# Patient Record
Sex: MALE | Race: WHITE | NOT HISPANIC OR LATINO | Employment: OTHER | URBAN - METROPOLITAN AREA
[De-identification: names, ages, dates, MRNs, and addresses within clinical notes are randomized per-mention and may not be internally consistent; named-entity substitution may affect disease eponyms.]

---

## 2017-09-10 ENCOUNTER — APPOINTMENT (OUTPATIENT)
Dept: CT IMAGING | Facility: CLINIC | Age: 82
End: 2017-09-10
Attending: NURSE PRACTITIONER
Payer: MEDICARE

## 2017-09-10 ENCOUNTER — HOSPITAL ENCOUNTER (OUTPATIENT)
Facility: CLINIC | Age: 82
Setting detail: OBSERVATION
Discharge: HOME | End: 2017-09-12
Attending: NURSE PRACTITIONER | Admitting: INTERNAL MEDICINE
Payer: MEDICARE

## 2017-09-10 ENCOUNTER — APPOINTMENT (OUTPATIENT)
Dept: GENERAL RADIOLOGY | Facility: CLINIC | Age: 82
End: 2017-09-10
Attending: NURSE PRACTITIONER
Payer: MEDICARE

## 2017-09-10 DIAGNOSIS — I71.43 ANEURYSM OF INFRARENAL ABDOMINAL AORTA (H): ICD-10-CM

## 2017-09-10 DIAGNOSIS — R56.9 SEIZURE (H): Primary | ICD-10-CM

## 2017-09-10 DIAGNOSIS — N17.9 ACUTE RENAL FAILURE, UNSPECIFIED ACUTE RENAL FAILURE TYPE (H): ICD-10-CM

## 2017-09-10 DIAGNOSIS — K56.7 ILEUS (H): ICD-10-CM

## 2017-09-10 DIAGNOSIS — R91.8 PULMONARY NODULES: ICD-10-CM

## 2017-09-10 DIAGNOSIS — R40.20 LOC (LOSS OF CONSCIOUSNESS) (H): ICD-10-CM

## 2017-09-10 LAB
ALBUMIN UR-MCNC: NEGATIVE MG/DL
ANION GAP SERPL CALCULATED.3IONS-SCNC: 10 MMOL/L (ref 3–14)
APPEARANCE UR: CLEAR
BILIRUB UR QL STRIP: NEGATIVE
BUN SERPL-MCNC: 42 MG/DL (ref 7–30)
CALCIUM SERPL-MCNC: 8.3 MG/DL (ref 8.5–10.1)
CHLORIDE SERPL-SCNC: 95 MMOL/L (ref 94–109)
CO2 SERPL-SCNC: 23 MMOL/L (ref 20–32)
COLOR UR AUTO: ABNORMAL
CREAT SERPL-MCNC: 1.31 MG/DL (ref 0.66–1.25)
GFR SERPL CREATININE-BSD FRML MDRD: 52 ML/MIN/1.7M2
GLUCOSE BLDC GLUCOMTR-MCNC: 216 MG/DL (ref 70–99)
GLUCOSE SERPL-MCNC: 225 MG/DL (ref 70–99)
GLUCOSE UR STRIP-MCNC: 150 MG/DL
HGB UR QL STRIP: NEGATIVE
KETONES UR STRIP-MCNC: NEGATIVE MG/DL
LEUKOCYTE ESTERASE UR QL STRIP: NEGATIVE
NITRATE UR QL: NEGATIVE
PH UR STRIP: 7 PH (ref 5–7)
POTASSIUM SERPL-SCNC: 4.4 MMOL/L (ref 3.4–5.3)
RBC #/AREA URNS AUTO: 0 /HPF (ref 0–2)
SODIUM SERPL-SCNC: 128 MMOL/L (ref 133–144)
SOURCE: ABNORMAL
SP GR UR STRIP: 1.01 (ref 1–1.03)
TROPONIN I SERPL-MCNC: <0.015 UG/L (ref 0–0.04)
UROBILINOGEN UR STRIP-MCNC: 0 MG/DL (ref 0–2)
WBC #/AREA URNS AUTO: 1 /HPF (ref 0–2)

## 2017-09-10 PROCEDURE — 99207 ZZC DOWN CODE DUE TO INITIAL EXAM: CPT | Performed by: INTERNAL MEDICINE

## 2017-09-10 PROCEDURE — 96365 THER/PROPH/DIAG IV INF INIT: CPT

## 2017-09-10 PROCEDURE — 93005 ELECTROCARDIOGRAM TRACING: CPT

## 2017-09-10 PROCEDURE — G0378 HOSPITAL OBSERVATION PER HR: HCPCS

## 2017-09-10 PROCEDURE — 81001 URINALYSIS AUTO W/SCOPE: CPT | Performed by: NURSE PRACTITIONER

## 2017-09-10 PROCEDURE — 99285 EMERGENCY DEPT VISIT HI MDM: CPT | Mod: 25

## 2017-09-10 PROCEDURE — 71260 CT THORAX DX C+: CPT

## 2017-09-10 PROCEDURE — 70450 CT HEAD/BRAIN W/O DYE: CPT

## 2017-09-10 PROCEDURE — 71020 XR CHEST 2 VW: CPT

## 2017-09-10 PROCEDURE — 96361 HYDRATE IV INFUSION ADD-ON: CPT

## 2017-09-10 PROCEDURE — 80048 BASIC METABOLIC PNL TOTAL CA: CPT | Performed by: NURSE PRACTITIONER

## 2017-09-10 PROCEDURE — 71250 CT THORAX DX C-: CPT

## 2017-09-10 PROCEDURE — 85025 COMPLETE CBC W/AUTO DIFF WBC: CPT | Performed by: NURSE PRACTITIONER

## 2017-09-10 PROCEDURE — 25000128 H RX IP 250 OP 636: Performed by: NURSE PRACTITIONER

## 2017-09-10 PROCEDURE — 99218 ZZC INITIAL OBSERVATION CARE,LEVL I: CPT | Performed by: INTERNAL MEDICINE

## 2017-09-10 PROCEDURE — 00000146 ZZHCL STATISTIC GLUCOSE BY METER IP

## 2017-09-10 PROCEDURE — 74177 CT ABD & PELVIS W/CONTRAST: CPT

## 2017-09-10 PROCEDURE — 84484 ASSAY OF TROPONIN QUANT: CPT | Performed by: NURSE PRACTITIONER

## 2017-09-10 RX ORDER — SODIUM CHLORIDE 9 MG/ML
1000 INJECTION, SOLUTION INTRAVENOUS CONTINUOUS
Status: DISCONTINUED | OUTPATIENT
Start: 2017-09-10 | End: 2017-09-11

## 2017-09-10 RX ORDER — IOPAMIDOL 755 MG/ML
500 INJECTION, SOLUTION INTRAVASCULAR ONCE
Status: COMPLETED | OUTPATIENT
Start: 2017-09-10 | End: 2017-09-10

## 2017-09-10 RX ADMIN — SODIUM CHLORIDE 1000 ML: 9 INJECTION, SOLUTION INTRAVENOUS at 23:36

## 2017-09-10 RX ADMIN — IOPAMIDOL 83 ML: 755 INJECTION, SOLUTION INTRAVENOUS at 23:20

## 2017-09-10 RX ADMIN — SODIUM CHLORIDE 61 ML: 9 INJECTION, SOLUTION INTRAVENOUS at 23:20

## 2017-09-10 RX ADMIN — SODIUM CHLORIDE 1000 ML: 9 INJECTION, SOLUTION INTRAVENOUS at 19:50

## 2017-09-10 ASSESSMENT — ENCOUNTER SYMPTOMS
APPETITE CHANGE: 1
WEAKNESS: 1
SEIZURES: 1
ABDOMINAL PAIN: 0
COUGH: 0
HEADACHES: 0
NAUSEA: 0

## 2017-09-10 NOTE — ED PROVIDER NOTES
"  History     Chief Complaint:  Loss of Consciousness      HPI   Franklyn Hein is a 84 year old male with a history of bone marrow cancer who presents with loss of consciousness. According the patient's wife, the patient is currently undergoing treatment for his cancer, through stomach injections every Tuesday and Friday. She states that today the patient had been laying down outside and came in because it started to rain, when he sat down he fell backwards in the chair and was unconscious for one minute. His daughter states that his face went blank and that it looked as if \"he was staring through her\". During that time he had an episode of urinary incontinence. When he came to he was very confused and did not remember the incident. After the family conferred they estimated this is probably the third time this has happened. Upon patient's arrival to the ED, he only complains of feeling globally weak. He also notes that he has not eaten or drank very much today. Patient had two normal bowel movements yesterday. Patient denies headache, fall, chest pain, nausea, abdominal pain, or cough. Patient denies all other complaints.     Allergies:  No known drug allergies      Medications:    Metformin  Amlodipine  Dexamethasone  Acyclovir  Atorvastatin  Prochlorperazine  Lorazepam    Past Medical History:    Cancer - Bone Marrow  Diabetes Mellitus, Type II    Past Surgical History:    History reviewed. No pertinent surgical history.     Family History:    History reviewed. No pertinent family history.      Social History:  Presents with wife and daughters   Tobacco use: unknown  Alcohol use: unknown  PCP: [facility]    Marital Status:       Review of Systems   Constitutional: Positive for appetite change.   Respiratory: Negative for cough.    Cardiovascular: Negative for chest pain.   Gastrointestinal: Negative for abdominal pain and nausea.   Neurological: Positive for seizures and weakness. " "Negative for headaches.   All other systems reviewed and are negative.    Physical Exam     Patient Vitals for the past 24 hrs:   BP Temp Temp src Pulse Heart Rate Resp SpO2 Height Weight   09/11/17 0121 118/62 98.6  F (37  C) Oral - 89 16 97 % - -   09/11/17 0015 (!) 162/92 - - - 70 - 98 % - -   09/11/17 0000 (!) 175/96 - - - 73 - 97 % - -   09/10/17 2345 (!) 164/92 - - - - - 99 % - -   09/10/17 2300 133/84 - - - 74 - 99 % 1.778 m (5' 10\") 64 kg (141 lb)   09/10/17 2250 - - - - 76 - 99 % - -   09/10/17 2249 140/88 - - - 76 - 99 % - -   09/10/17 2112 - - - - - - 100 % - -   09/10/17 2106 (!) 164/94 - - - - - - - -   09/10/17 1945 (!) 171/95 - - - 71 - 100 % - -   09/10/17 1832 149/88 96.8  F (36  C) Oral 75 75 16 100 % - -           Physical Exam     General:  Thin male, appears stated age and wearing hearing aids  HENT: Atraumatic, normal external ears, no or erythema of TMs, no posterior oropharynx swelling, erythema, or exudate. Bilateral hearing aids in place.  Eyes: No scleral injection, conjunctivitis, or drainage. EOM intact. No nystagmus.   Neck: Supple with normal ROM  Cardio: Regular rate and rhythm, no murmurs, rubs, or gallops  Pulmonary/Chest: Clear to ausculation bilaterally. No stridor or wheezes over the upper or lower airways.    Abdomen: Soft, non-tender, normal bowel sounds, no rebound or guarding  Musculoskeletal: No pedal edema, posterior tibial 2+, dorsalis pedis 2+, normal strength and tone  Lymph: No anterior or posterior lymphadenopathy.   Neuro: Alert and oriented, pupils equal, round, and reactive, tongue protrudes midline, normal speech, no focal deficits noted.   Skin: Normal color and temperature, no rashes, abrasions, or lacerations, no diaphoresis.  Psych: Mood and affect normal. Speech non-pressured.        Emergency Department Course   ECG (15:54:38):  Rate 72 bpm. IA interval 162. QRS duration 104. QT/QTc 412/451. P-R-T axes 79 78 81. Normal sinus rhythm. Normal ECG. Interpreted at " 1558 by \[facility] [place] [facility]\"".     Imaging:  Radiographic findings were communicated with the patient and family who voiced understanding of the findings.    CT Head w/o Contrast:   1. No acute abnormality.  2. Atrophy of the brain.  White matter changes consistent with sequelae of small vessel ischemic disease.     Chest XR, PA & LAT:   IMPRESSION: Hyperinflated lungs with radiolucency consistent with bullous emphysema, worse on left than on the right. Vague infiltrates laterally at both lung bases could be acute or chronic. Normal heart size and pulmonary vascularity. No pleural effusion.    CHRIS CASTRO MD      CT Chest with IV Contrast:   IMPRESSION:  1. Multiple bilateral pulmonary nodules at the lung bases. Some interstitial prominence also noted. See below for follow up recommendations. One of the largest nodules is 0.9 cm at the right lower lobe.  2. No lobar pneumonia identified.  3. Diffuse emphysema.  4. Very large infrarenal abdominal aortic aneurysm that is 7.2 cm. No adjacent hemorrhage visualized on the included images. Consider  vascular consultation for management.  5. Trace ascites.     CT Chest/Abdomen/Pelvis w Contrast:   IMPRESSION:  1. 7 cm abdominal aortic aneurysm and bilateral common iliac artery aneurysms. No aortic dissection.  2. Emphysema.  3. A few indeterminate lung nodules measuring up to 0.9 cm. Follow-up recommended.   4. Dilated small bowel loops without evidence of obstruction. This may be an ileus.  5. Small amount of ascites.    Results per radiology.     Laboratory:  CBC: HGB 10.9 (L),  (L) o/w WNL (WBC 7.5)    BMP: (Creatinine 1.31 (H)),  (L), Glucose 225 (H), BUN 42 (H), GFR 52 (L), Calcium 8.3 (L) o/3 WNL  Glucose: 216 (H)     1932: Troponin I: <0.015     UA with micro: Pending    Interventions:  1950: NS 1L IV Bolus     Emergency Department Course:  Past medical records, nursing notes, and vitals reviewed.  1829: I performed an exam of the patient and obtained history, as  documented above.  IV inserted and blood drawn.   Above interventions provided.   The patient was sent for a head CT, chest CT, chest/abdomen/pelvis CT, and chest xray while in the emergency department, findings above.   2245: I spoke with Dr. Lejeune from vascular surgery regarding this patient.   I personally reviewed the laboratory results with the Patient and spouse and daughter and answered all related questions prior to admission.  Findings and plan explained to the Patient and spouse and daughter who consents to admission.   2217: Discussed the patient with Dr. Enamorado, who will admit the patient to a med/surg bed for further monitoring, evaluation, and treatment.        Impression & Plan    Medical Decision Making:    Franklyn 84 male with a history of bone marrow cancer presents with loss of consciousness and possible seizure. Head CT performed no acute findings. There is no evidence of pneumonia. There were findings of pulmonary nodule and infrarenal abdominal aortic aneurysm. Patient is asymptomatic consulted  Dr. Lejeune discussed results and findings of ARF recommended CT chest/abd/pelvis and pre hydrate. Showed  7 cm abdominal aortic aneurysm and bilateral common iliac artery  aneurysms. No aortic dissection. Re-paged Dr. Lejeune updated results. Patient and family were updated of findings and were amendable to admission for further work up and close monitoring.       Diagnosis:      ICD-10-CM    1. LOC (loss of consciousness) (H) R40.20 UA with Microscopic     Glucose by meter     Glucose by meter   2. Pulmonary nodules R91.8    3. Acute renal failure, unspecified acute renal failure type (H) N17.9    4. Aneurysm of infrarenal abdominal aorta (H) I71.4     7.2 CM   5. Ileus (H) K56.7        Disposition:  Admitted to a med/surg bed.     9/10/2017   [facility] EMERGENCY DEPARTMENT  I, Ninfa Butler am serving as a scribe at 6:29 PM on 9/10/2017 to document services personally performed by  Clare Mckeon, OCTAVIO C based on my observations and the provider's statements to me.       Clare Mckeon, OCTAVIO CNP  09/11/17 0233

## 2017-09-10 NOTE — IP AVS SNAPSHOT
MRN:[id number]                      After Visit Summary   9/10/2017    Franklyn Hein    MRN: [id number]           Thank you!     Thank you for choosing [facility] for your care. Our goal is always to provide you with excellent care. Hearing back from our patients is one way we can continue to improve our services. Please take a few minutes to complete the written survey that you may receive in the mail after you visit. If you would like to speak to someone directly about your visit please contact Patient Relations at [phone]. Thank you!          Patient Information     Date Of Birth          10/28/1932        About your hospital stay     You were admitted on:  September 10, 2017 You last received care in the:  [facility] Observation Department    You were discharged on:  September 12, 2017        Reason for your hospital stay       Possible seizure                  Who to Call     For medical emergencies, please call 911.  For non-urgent questions about your medical care, please call your primary care provider or clinic, None          Attending Provider     Provider Specialty    Clare Mckeon APRN CNP Nurse Practitioner - Family    [facility], [facility]    Megan Sam DO Internal Medicine       Primary Care Provider Fax #    [facility] [phone]      After Care Instructions     Activity       Your activity upon discharge: activity as tolerated            Diet       Follow this diet upon discharge: Orders Placed This Encounter      Combination Diet 5551-5870 Calories: Moderate Consistent CHO (4-6 CHO units/meal); Mechanical/Dental Soft Diet                  Follow-up Appointments     Follow-up and recommended labs and tests        Follow up with primary care provider, [facility], within 7 days for hospital follow- up.  No follow up labs or test are needed. Follow-up with [facility] of  "Neurology ([phone]. Dr. Hernandez is the Neurologist who saw you here). You need follow-up with a vascular surgeon for the aortic aneurysm seen on CT scan. I have placed a referral for vascular surgery through [place]. I have also called your PCP to inform him.                  Additional Services     Home Care PT Referral for Hospital Discharge       PT to eval and treat    Your provider has ordered home care - physical therapy. If you have not been contacted within 2 days of your discharge please call the department phone number listed on the top of this document.            Home care nursing referral       RN extended hours visit. RN to assess vital signs and weight.    Your provider has ordered home care nursing services. If you have not been contacted within 2 days of your discharge please call the inpatient department phone number at [phone] .            Vascular Surgery Referral                            Further instructions from your care team       A referral has been made to [facility] for Physical Therapy and Skilled Nursing services. They will contact you in the next 24 - 48 hours. If you have not heard from them in that time, please contact them at [phone].     Pending Results     No orders found from 9/8/2017 to 9/11/2017.            Statement of Approval     Ordered          09/12/17 1325  I have reviewed and agree with all the recommendations and orders detailed in this document.  EFFECTIVE NOW     Approved and electronically signed by:  Porfirio Clifton MD             Admission Information     Date & Time Provider Department Dept. Phone    9/10/2017 Megan Sam DO [facility] Observation Department [phone]      Your Vitals Were     Blood Pressure Pulse Temperature Respirations Height Weight    101/55 (BP Location: Left arm) 75 96.5  F (35.8  C) (Oral) 16 1.778 m (5' 10\") 64 kg (141 lb)    Pulse Oximetry BMI (Body Mass Index)                98% " "20.23 kg/m2          [employer] Information     [employer] lets you send messages to your doctor, view your test results, renew your prescriptions, schedule appointments and more. To sign up, go to [email or web address] . Click on \"Log in\" on the left side of the screen, which will take you to the Welcome page. Then click on \"Sign up Now\" on the right side of the page.     You will be asked to enter the access code listed below, as well as some personal information. Please follow the directions to create your username and password.     Your access code is: [email or web address]  Expires: 2017 12:36 PM     Your access code will  in 90 days. If you need help or a new code, please call your [facility] clinic or [phone].        Care EveryWhere ID     This is your Care EveryWhere ID. This could be used by other organizations to access your [facility] medical records  [id number]        Equal Access to Services     LO FRYE : Hadii van bhatto Sotrent, waaxda luqadaha, qaybta kaalmada adeegyascooby, klever donovan . So [facility] [phone].    ATENCIÓN: Si habla español, tiene a bartlett disposición servicios gratuitos de asistencia lingüística. Radha al [phone].    We comply with applicable federal civil rights laws and [place] laws. We do not discriminate on the basis of race, color, national origin, age, disability sex, sexual orientation or gender identity.               Review of your medicines      START taking        Dose / Directions    levETIRAcetam 250 MG tablet   Commonly known as:  KEPPRA   Notes to Patient:  This medication was started by Neurology during hospitalization 9/10-        Dose:  250 mg   Take 1 tablet (250 mg) by mouth 2 times daily   Quantity:  60 tablet   Refills:  0         CONTINUE these medicines which have NOT CHANGED        Dose / Directions    ACYCLOVIR PO        Dose:  400 mg   Take 400 mg by mouth 2 times daily   Refills:  0       AMLODIPINE BESYLATE PO "        Dose:  2.5 mg   Take 2.5 mg by mouth daily   Refills:  0       ATORVASTATIN CALCIUM PO        Dose:  10 mg   Take 10 mg by mouth daily   Refills:  0       DEXAMETHASONE PO        Dose:  40 mg   Take 40 mg by mouth Dexamethasone 4mg tabs: take 10 tabs (40mg) daily on days 1-4 and days 9-12 every 21 days with chemo.   Refills:  0       LISINOPRIL PO        Dose:  10 mg   Take 10 mg by mouth daily   Refills:  0       LORAZEPAM PO        Dose:  0.5-1 mg   Take 0.5-1 mg by mouth every 6 hours as needed for anxiety or nausea   Refills:  0       metFORMIN 500 MG 24 hr tablet   Commonly known as:  GLUCOPHAGE-XR        Dose:  1000 mg   Take 1,000 mg by mouth daily   Refills:  0       PROCHLORPERAZINE MALEATE PO        Dose:  5-10 mg   Take 5-10 mg by mouth every 6 hours as needed for nausea   Refills:  0            Where to get your medicines      These medications were sent to [facility] [address] [facility]  [address] [facility] [address]     Phone:  [phone]     levETIRAcetam 250 MG tablet                Protect others around you: Learn how to safely use, store and throw away your medicines at www.disposemymeds.org.             Medication List: This is a list of all your medications and when to take them. Check marks below indicate your daily home schedule. Keep this list as a reference.      Medications           Morning Afternoon Evening Bedtime As Needed    ACYCLOVIR PO   Take 400 mg by mouth 2 times daily   Last time this was given:  400 mg on 9/12/2017 12:05 PM                                      AMLODIPINE BESYLATE PO   Take 2.5 mg by mouth daily                                   ATORVASTATIN CALCIUM PO   Take 10 mg by mouth daily                                   DEXAMETHASONE PO   Take 40 mg by mouth Dexamethasone 4mg tabs: take 10 tabs (40mg) daily on days 1-4 and days 9-12 every 21 days with chemo.                            Take as directed by  Oncologist       levETIRAcetam 250 MG tablet   Commonly known as:  KEPPRA   Take 1 tablet (250 mg) by mouth 2 times daily   Last time this was given:  250 mg on 9/12/2017  4:09 AM   Next Dose Due:  4:00PM   Notes to Patient:  This medication was started by Neurology during hospitalization 9/10-9/12            4:00AM.  You may want to change the times that this med is given.  If so, please take note to take every 12 hours.           4:00PM               LISINOPRIL PO   Take 10 mg by mouth daily   Last time this was given:  10 mg on 9/11/2017  3:59 PM                                   LORAZEPAM PO   Take 0.5-1 mg by mouth every 6 hours as needed for anxiety or nausea                                   metFORMIN 500 MG 24 hr tablet   Commonly known as:  GLUCOPHAGE-XR   Take 1,000 mg by mouth daily                                   PROCHLORPERAZINE MALEATE PO   Take 5-10 mg by mouth every 6 hours as needed for nausea

## 2017-09-10 NOTE — ED NOTES
"[place], sat on the couch, then suddenly fell back into the couch and would not respond to anyone. This event lasted about a minute per his wife, incontinent during event and confused afterward. Alert and oriented x 4 now, but has little insight into the event and does not remember it.    History of \"bone marrow cancer\" and currently getting chemotherapy for this. He has been \"getting a shot for anemia too\", per his wife.  "

## 2017-09-10 NOTE — Clinical Note
Admitting Physician: MILANA AGUILAR [[id number]]   [facility] [[id number][facility]   Bed Type: Adult Med/Surg [46]   Special needs: Fall Risk [8]   Special needs: Cancer [7]   Bed request comments: MED ADMIT / BED REQUEST AT 7420

## 2017-09-10 NOTE — IP AVS SNAPSHOT
[facility] Observation Department    [address]    [facility] [address]    Phone:  [phone]                                       After Visit Summary   9/10/2017    Franklyn Hein    MRN: [id number]           After Visit Summary Signature Page     I have received my discharge instructions, and my questions have been answered. I have discussed any challenges I see with this plan with the nurse or doctor.    ..........................................................................................................................................  Patient/Patient Representative Signature      ..........................................................................................................................................  Patient Representative Print Name and Relationship to Patient    ..................................................               ................................................  Date                                            Time    ..........................................................................................................................................  Reviewed by Signature/Title    ...................................................              ..............................................  Date                                                            Time

## 2017-09-11 ENCOUNTER — APPOINTMENT (OUTPATIENT)
Dept: PHYSICAL THERAPY | Facility: CLINIC | Age: 82
End: 2017-09-11
Attending: INTERNAL MEDICINE
Payer: MEDICARE

## 2017-09-11 ENCOUNTER — APPOINTMENT (OUTPATIENT)
Dept: MRI IMAGING | Facility: CLINIC | Age: 82
End: 2017-09-11
Attending: INTERNAL MEDICINE
Payer: MEDICARE

## 2017-09-11 PROBLEM — R56.9 SEIZURE (H): Status: ACTIVE | Noted: 2017-09-11

## 2017-09-11 LAB
ANION GAP SERPL CALCULATED.3IONS-SCNC: 7 MMOL/L (ref 3–14)
BASOPHILS # BLD AUTO: 0 10E9/L (ref 0–0.2)
BASOPHILS NFR BLD AUTO: 0 %
BUN SERPL-MCNC: 31 MG/DL (ref 7–30)
CALCIUM SERPL-MCNC: 7.4 MG/DL (ref 8.5–10.1)
CHLORIDE SERPL-SCNC: 104 MMOL/L (ref 94–109)
CO2 SERPL-SCNC: 23 MMOL/L (ref 20–32)
CREAT SERPL-MCNC: 1.12 MG/DL (ref 0.66–1.25)
DIFFERENTIAL METHOD BLD: ABNORMAL
EOSINOPHIL # BLD AUTO: 0 10E9/L (ref 0–0.7)
EOSINOPHIL NFR BLD AUTO: 0 %
ERYTHROCYTE [DISTWIDTH] IN BLOOD BY AUTOMATED COUNT: 14.3 % (ref 10–15)
GFR SERPL CREATININE-BSD FRML MDRD: 62 ML/MIN/1.7M2
GLUCOSE BLDC GLUCOMTR-MCNC: 122 MG/DL (ref 70–99)
GLUCOSE BLDC GLUCOMTR-MCNC: 137 MG/DL (ref 70–99)
GLUCOSE BLDC GLUCOMTR-MCNC: 142 MG/DL (ref 70–99)
GLUCOSE BLDC GLUCOMTR-MCNC: 156 MG/DL (ref 70–99)
GLUCOSE BLDC GLUCOMTR-MCNC: 170 MG/DL (ref 70–99)
GLUCOSE SERPL-MCNC: 170 MG/DL (ref 70–99)
HBA1C MFR BLD: 8.2 % (ref 4.3–6)
HCT VFR BLD AUTO: 31.6 % (ref 40–53)
HGB BLD-MCNC: 10.9 G/DL (ref 13.3–17.7)
INTERPRETATION ECG - MUSE: NORMAL
LYMPHOCYTES # BLD AUTO: 0.3 10E9/L (ref 0.8–5.3)
LYMPHOCYTES NFR BLD AUTO: 4 %
MAGNESIUM SERPL-MCNC: 1.9 MG/DL (ref 1.6–2.3)
MCH RBC QN AUTO: 33 PG (ref 26.5–33)
MCHC RBC AUTO-ENTMCNC: 34.5 G/DL (ref 31.5–36.5)
MCV RBC AUTO: 96 FL (ref 78–100)
METAMYELOCYTES # BLD: 0.2 10E9/L
METAMYELOCYTES NFR BLD MANUAL: 3 %
MONOCYTES # BLD AUTO: 0.4 10E9/L (ref 0–1.3)
MONOCYTES NFR BLD AUTO: 5 %
MYELOCYTES # BLD: 0.8 10E9/L
MYELOCYTES NFR BLD MANUAL: 11 %
NEUTROPHILS # BLD AUTO: 5.8 10E9/L (ref 1.6–8.3)
NEUTROPHILS NFR BLD AUTO: 77 %
NRBC # BLD AUTO: 0.1 10*3/UL
NRBC BLD AUTO-RTO: 1 /100
PLATELET # BLD AUTO: 118 10E9/L (ref 150–450)
PLATELET # BLD EST: NORMAL 10*3/UL
POTASSIUM SERPL-SCNC: 4.3 MMOL/L (ref 3.4–5.3)
RBC # BLD AUTO: 3.3 10E12/L (ref 4.4–5.9)
RBC MORPH BLD: ABNORMAL
SODIUM SERPL-SCNC: 134 MMOL/L (ref 133–144)
TSH SERPL DL<=0.005 MIU/L-ACNC: 2.16 MU/L (ref 0.4–4)
WBC # BLD AUTO: 7.5 10E9/L (ref 4–11)

## 2017-09-11 PROCEDURE — A9270 NON-COVERED ITEM OR SERVICE: HCPCS | Mod: GY | Performed by: PHYSICIAN ASSISTANT

## 2017-09-11 PROCEDURE — 83036 HEMOGLOBIN GLYCOSYLATED A1C: CPT | Performed by: INTERNAL MEDICINE

## 2017-09-11 PROCEDURE — 36415 COLL VENOUS BLD VENIPUNCTURE: CPT | Performed by: INTERNAL MEDICINE

## 2017-09-11 PROCEDURE — 96361 HYDRATE IV INFUSION ADD-ON: CPT

## 2017-09-11 PROCEDURE — G0378 HOSPITAL OBSERVATION PER HR: HCPCS

## 2017-09-11 PROCEDURE — 25000132 ZZH RX MED GY IP 250 OP 250 PS 637: Mod: GY | Performed by: INTERNAL MEDICINE

## 2017-09-11 PROCEDURE — 70553 MRI BRAIN STEM W/O & W/DYE: CPT

## 2017-09-11 PROCEDURE — A9270 NON-COVERED ITEM OR SERVICE: HCPCS | Mod: GY | Performed by: INTERNAL MEDICINE

## 2017-09-11 PROCEDURE — 99207 ZZC CDG-MDM COMPONENT: MEETS LOW - DOWN CODED: CPT | Performed by: INTERNAL MEDICINE

## 2017-09-11 PROCEDURE — 25000128 H RX IP 250 OP 636: Performed by: INTERNAL MEDICINE

## 2017-09-11 PROCEDURE — 99225 ZZC SUBSEQUENT OBSERVATION CARE,LEVEL II: CPT | Performed by: INTERNAL MEDICINE

## 2017-09-11 PROCEDURE — 80048 BASIC METABOLIC PNL TOTAL CA: CPT | Performed by: INTERNAL MEDICINE

## 2017-09-11 PROCEDURE — 00000146 ZZHCL STATISTIC GLUCOSE BY METER IP

## 2017-09-11 PROCEDURE — A9585 GADOBUTROL INJECTION: HCPCS | Performed by: RADIOLOGY

## 2017-09-11 PROCEDURE — 25000128 H RX IP 250 OP 636: Performed by: RADIOLOGY

## 2017-09-11 PROCEDURE — 83735 ASSAY OF MAGNESIUM: CPT | Performed by: INTERNAL MEDICINE

## 2017-09-11 PROCEDURE — 95816 EEG AWAKE AND DROWSY: CPT

## 2017-09-11 PROCEDURE — 25000132 ZZH RX MED GY IP 250 OP 250 PS 637: Mod: GY | Performed by: PHYSICIAN ASSISTANT

## 2017-09-11 PROCEDURE — 84443 ASSAY THYROID STIM HORMONE: CPT | Performed by: INTERNAL MEDICINE

## 2017-09-11 RX ORDER — LIDOCAINE 40 MG/G
CREAM TOPICAL
Status: DISCONTINUED | OUTPATIENT
Start: 2017-09-11 | End: 2017-09-12 | Stop reason: HOSPADM

## 2017-09-11 RX ORDER — AMLODIPINE BESYLATE 2.5 MG/1
2.5 TABLET ORAL DAILY
Status: DISCONTINUED | OUTPATIENT
Start: 2017-09-11 | End: 2017-09-12 | Stop reason: HOSPADM

## 2017-09-11 RX ORDER — GADOBUTROL 604.72 MG/ML
7.5 INJECTION INTRAVENOUS ONCE
Status: COMPLETED | OUTPATIENT
Start: 2017-09-11 | End: 2017-09-11

## 2017-09-11 RX ORDER — NALOXONE HYDROCHLORIDE 0.4 MG/ML
.1-.4 INJECTION, SOLUTION INTRAMUSCULAR; INTRAVENOUS; SUBCUTANEOUS
Status: DISCONTINUED | OUTPATIENT
Start: 2017-09-11 | End: 2017-09-12 | Stop reason: HOSPADM

## 2017-09-11 RX ORDER — LEVETIRACETAM 250 MG/1
250 TABLET ORAL 2 TIMES DAILY
Status: DISCONTINUED | OUTPATIENT
Start: 2017-09-11 | End: 2017-09-12 | Stop reason: HOSPADM

## 2017-09-11 RX ORDER — LISINOPRIL 10 MG/1
10 TABLET ORAL DAILY
Status: DISCONTINUED | OUTPATIENT
Start: 2017-09-11 | End: 2017-09-12

## 2017-09-11 RX ORDER — ONDANSETRON 4 MG/1
4 TABLET, ORALLY DISINTEGRATING ORAL EVERY 6 HOURS PRN
Status: DISCONTINUED | OUTPATIENT
Start: 2017-09-11 | End: 2017-09-12 | Stop reason: HOSPADM

## 2017-09-11 RX ORDER — ACYCLOVIR 400 MG/1
400 TABLET ORAL 2 TIMES DAILY
Status: DISCONTINUED | OUTPATIENT
Start: 2017-09-11 | End: 2017-09-12 | Stop reason: HOSPADM

## 2017-09-11 RX ORDER — NITROGLYCERIN 0.4 MG/1
0.4 TABLET SUBLINGUAL EVERY 5 MIN PRN
Status: DISCONTINUED | OUTPATIENT
Start: 2017-09-11 | End: 2017-09-12 | Stop reason: HOSPADM

## 2017-09-11 RX ORDER — METFORMIN HCL 500 MG
1000 TABLET, EXTENDED RELEASE 24 HR ORAL DAILY
COMMUNITY
End: 2017-09-26

## 2017-09-11 RX ORDER — ONDANSETRON 2 MG/ML
4 INJECTION INTRAMUSCULAR; INTRAVENOUS EVERY 6 HOURS PRN
Status: DISCONTINUED | OUTPATIENT
Start: 2017-09-11 | End: 2017-09-12 | Stop reason: HOSPADM

## 2017-09-11 RX ORDER — DEXTROSE MONOHYDRATE 25 G/50ML
25-50 INJECTION, SOLUTION INTRAVENOUS
Status: DISCONTINUED | OUTPATIENT
Start: 2017-09-11 | End: 2017-09-12 | Stop reason: HOSPADM

## 2017-09-11 RX ORDER — ACETAMINOPHEN 325 MG/1
650 TABLET ORAL EVERY 4 HOURS PRN
Status: DISCONTINUED | OUTPATIENT
Start: 2017-09-11 | End: 2017-09-12 | Stop reason: HOSPADM

## 2017-09-11 RX ORDER — SODIUM CHLORIDE 9 MG/ML
INJECTION, SOLUTION INTRAVENOUS CONTINUOUS
Status: DISCONTINUED | OUTPATIENT
Start: 2017-09-11 | End: 2017-09-11

## 2017-09-11 RX ORDER — NICOTINE POLACRILEX 4 MG
15-30 LOZENGE BUCCAL
Status: DISCONTINUED | OUTPATIENT
Start: 2017-09-11 | End: 2017-09-12 | Stop reason: HOSPADM

## 2017-09-11 RX ADMIN — LEVETIRACETAM 250 MG: 100 INJECTION, SOLUTION INTRAVENOUS at 00:40

## 2017-09-11 RX ADMIN — SODIUM CHLORIDE: 9 INJECTION, SOLUTION INTRAVENOUS at 01:37

## 2017-09-11 RX ADMIN — LISINOPRIL 10 MG: 10 TABLET ORAL at 15:59

## 2017-09-11 RX ADMIN — GADOBUTROL 6 ML: 604.72 INJECTION INTRAVENOUS at 19:00

## 2017-09-11 RX ADMIN — LEVETIRACETAM 250 MG: 250 TABLET, FILM COATED ORAL at 16:25

## 2017-09-11 RX ADMIN — ACYCLOVIR 400 MG: 400 TABLET ORAL at 13:08

## 2017-09-11 NOTE — PLAN OF CARE
Problem: Discharge Planning  Goal: Discharge Planning (Adult, OB, Behavioral, Peds)  Outcome: No Change  Problem: Discharge Planning  Goal: Discharge Planning (Adult, OB, Behavioral, Peds)  Outcome: Improving  PRIMARY DIAGNOSIS: Seizure  OUTPATIENT/OBSERVATION GOALS TO BE MET BEFORE DISCHARGE:  1. Orthostatic performed: No      2. Diagnostic testing complete & at baseline neurologic testing: EEG pending      3. Cleared by consultants (if involved): No      4. Interpretation of cardiac rhythm per telemetry tech: SR, HR 78      5. Tolerating adequate PO diet and medications: Yes      6. Return to near baseline physical activity or neurologic status: SBA with walker  Discharge Planner Nurse   Safe discharge environment identified: Yes  Barriers to discharge: not once medically cleared.        Entered by: Veronica Mahoney      Please review provider order for any additional goals.   Nurse to notify provider when observation goals have been met and patient is ready for discharge.      Vitals stable. Alert and oriented x4. [place]. Seizure precautions in place. Neuro's intact. No seizure activity since admission. Tele monitoring in place. Pt tolerated pills whole with water. IV fluids infusing. Pt resting comfortably. Awaiting EEG results and plan for MRI today. Will continue to monitor and provide supportive cares.

## 2017-09-11 NOTE — PROGRESS NOTES
"Pt reports that he can not swallow well. He reports the food \"gets stuck\". Pt reports this has been happening for the last month. Pt tolerated water without difficulty. Pt's family reports the pt had to get surgery \"to open his throat\" in the past. MD notified.   "

## 2017-09-11 NOTE — ED NOTES
"[facility]  ED Nurse Handoff Report    Franklyn Hein is a 84 year old male   ED Chief complaint: Loss of Consciousness  . ED Diagnosis:   Final diagnoses:   LOC (loss of consciousness) (H)   Pulmonary nodules   Acute renal failure, unspecified acute renal failure type (H)   Aneurysm of infrarenal abdominal aorta (H) - 7.2 CM     Allergies: Allergies not on file    Code Status: Full Code  Activity level - Baseline/Home:  Stand with Assist of 2. Activity Level - Current:   Stand with Assist of 2. Lift room needed: No. Bariatric: No   Needed: No   Isolation: No. Infection: Not Applicable.     Vital Signs:   Vitals:    09/10/17 2106 09/10/17 2112 09/10/17 2249 09/10/17 2250   BP: (!) 164/94  140/88    Pulse:       Resp:       Temp:       TempSrc:       SpO2:  100% 99% 99%       Cardiac Rhythm:  ,      Pain level: 0-10 Pain Scale: 0  Patient confused: No. Patient Falls Risk: Yes.   Elimination Status: Has voided   Patient Report - Initial Complaint: Syncope. [place]ed [place], sat on the couch, then suddenly fell back into the couch and would not respond to anyone. This event lasted about a minute per his wife, incontinent during event and confused afterward. Alert and oriented x 4 now, but has little insight into the event and does not remember it.  History of \"bone marrow cancer\" and currently getting chemotherapy for this. He has been \"getting a shot for anemia too\", per his wife.     Tests Performed: Labs, CXR, CT chest, CT head. Abnormal Results: Sodium 128, Cr 1.3, CT chest shows incidental findings of infrarenal abdominal aortic aneurysm of 7.2cm - will do CT chest/abdomen/pelvis with contrast.  Treatments provided: NS bolus  Family Comments: Family at bedside  OBS brochure/video discussed/provided to patient:  Yes  ED Medications:   Medications   0.9% sodium chloride BOLUS (1,000 mLs Intravenous New Bag 9/10/17 1950)     " Followed by   0.9% sodium chloride infusion (not administered)     Drips infusing:  No  For the majority of the shift this patient was Yellow. Interventions performed were Monitor.     Severe Sepsis OR Septic Shock Diagnosis Present: No      ED Nurse Name/Phone Number: Jim Mota,   11:05 PM    RECEIVING UNIT ED HANDOFF REVIEW    Above ED Nurse Handoff Report was reviewed: Yes  Reviewed by: Louise Macdonald on September 10, 2017 at 11:33 PM

## 2017-09-11 NOTE — PLAN OF CARE
Problem: Discharge Planning  Goal: Discharge Planning (Adult, OB, Behavioral, Peds)  Outcome: Improving  PRIMARY DIAGNOSIS: Seizure  OUTPATIENT/OBSERVATION GOALS TO BE MET BEFORE DISCHARGE:  1. Orthostatic performed: No     2. Diagnostic testing complete & at baseline neurologic testing: No, Neurology consult     3. Cleared by consultants (if involved): No     4. Interpretation of cardiac rhythm per telemetry tech: SR with long QT/Tall T-waves 85     5. Tolerating adequate PO diet and medications: Yes     6. Return to near baseline physical activity or neurologic status: No currently A-1 with cane, Ind with Cane a baseline  Discharge Planner Nurse   Safe discharge environment identified: Yes  Barriers to discharge: not once medically cleared.        Entered by: Louise Macdonald 09/11/2017 2:53 AM     Please review provider order for any additional goals.   Nurse to notify provider when observation goals have been met and patient is ready for discharge.     Patient is alert and oriented x4, forgetfull at times, [place]. Patient had an unresponsive event prior to admission with confusions. Suspecting seizure.  Patient reports feeling better. Neuro's intact. Tele monitoring in place, Neurology consult in place. Granddaughter at bedside. IV fluids infusing. Will continue to monitor, assess, and offer supportive cares

## 2017-09-11 NOTE — PLAN OF CARE
Problem: Discharge Planning  Goal: Discharge Planning (Adult, OB, Behavioral, Peds)  Outcome: No Change  Problem: Discharge Planning  Goal: Discharge Planning (Adult, OB, Behavioral, Peds)  Outcome: Improving  PRIMARY DIAGNOSIS: Seizure  OUTPATIENT/OBSERVATION GOALS TO BE MET BEFORE DISCHARGE:  1. Orthostatic performed: No      2. Diagnostic testing complete & at baseline neurologic testing: EEG pending      3. Cleared by consultants (if involved): No      4. Interpretation of cardiac rhythm per telemetry tech: SR, HR 78      5. Tolerating adequate PO diet and medications: Yes      6. Return to near baseline physical activity or neurologic status: SBA with walker  Discharge Planner Nurse   Safe discharge environment identified: Yes  Barriers to discharge: not once medically cleared.        Entered by: Veronica Mahoney      Please review provider order for any additional goals.   Nurse to notify provider when observation goals have been met and patient is ready for discharge.      Vitals stable. Alert and oriented x4. [place]. Seizure precautions in place. Neuro's intact. No seizure activity since admission. Tele monitoring in place. IV fluids infusing. Pt resting comfortably. Awaiting EEG results and plan for MRI today. Plan for pt to start oral Keppra and stay overnight for monitoring. Pt resting comfortably. Will continue to monitor and provide supportive cares.

## 2017-09-11 NOTE — PLAN OF CARE
Problem: Discharge Planning  Goal: Discharge Planning (Adult, OB, Behavioral, Peds)  Outcome: Improving  Problem: Discharge Planning  Goal: Discharge Planning (Adult, OB, Behavioral, Peds)  Outcome: Improving  PRIMARY DIAGNOSIS: Seizure  OUTPATIENT/OBSERVATION GOALS TO BE MET BEFORE DISCHARGE:  1. Orthostatic performed: No      2. Diagnostic testing complete & at baseline neurologic testing: No, Neurology consult      3. Cleared by consultants (if involved): No      4. Interpretation of cardiac rhythm per telemetry tech: SR, HR 78      5. Tolerating adequate PO diet and medications: Yes      6. Return to near baseline physical activity or neurologic status: SBA with walker  Discharge Planner Nurse   Safe discharge environment identified: Yes  Barriers to discharge: not once medically cleared.        Entered by: Veronica Mahoney     Please review provider order for any additional goals.   Nurse to notify provider when observation goals have been met and patient is ready for discharge.      Vitals stable. Alert and oriented x4. [place]. Seizure precautions in place. Neuro's intact. No seizure activity since admission. Tele monitoring in place, Neurology consults scheduled this am. Granddaughter at bedside. IV fluids infusing. Pt resting comfortably. Will continue to monitor and provide supportive cares.

## 2017-09-11 NOTE — H&P
"               [facility]  Hospitalist Admission Note  September 10, 2017  Name: Franklyn Hein    MRN: [id number]  YOB: 1932    Age: 84 year old  Date of admission: 9/10/2017  [facility] care provider: [facility]      Summary:  Franklyn Hein is a 84 year old male with a history of bone marrow cancer \"multiple myeloma\", hypertension, hyperlipidemia, and type 2 diabetes  who presents with brief episode of unresponsiveness associated with urinary incontinence.  Symptoms are suspicious for probable seizure event.    Problem list  1. Probable seizure event: Suspect possibly partial complex.  Head CT is otherwise negative.  Neurologically at baseline with no focal findings at this point in time  2. Type 2 diabetes  3. History of multiple myeloma per patient report: He does follow with Dr. Salcedo, [place] [facility].  4. Hypertension  5. Hyperlipidemia  6. Incidental simple 2 cm infrarenal abdominal aortic aneurysm: Likely not contributing to problem #1  7. Suspect acute on Chronic kidney disease: No previous creatinine for comparison.  Patient does appear clinically dehydrated    Plan:    Observation admission to telemetry at this time    We'll check an EEG as well as get a formal neurology consultation    Medium intensity sliding scale and resume his chronic metformin    Resume chronic essential medications once doses are reconciled.    IV fluid challenge for elevated creatinine.  Continue to monitor for now    Will need vascular surgery outpatient referral once discharged for aneurysm workup and intervention    -Additional workup plan which will include EEG and neurology consultation    All lab work and imaging data independently reviewed by myself    Prophylaxis;  Consider E [place] apparent if patient spends greater than two midnight here .   Discharge: Home with family when able    Chief Complaint:   Brief episode of unresponsiveness    HPI  Franklyn Hein is a 84 " "year old male with a history of bone marrow cancer \"multiple myeloma\", hypertension, hyperlipidemia, and type 2 diabetes  who presents with brief episode of unresponsiveness associated with urinary incontinence. According the patient's wife, the patient is currently undergoing treatment for his cancer with stomach injections every Tuesday and Friday. She states that today the patient had been laying down outside and came in because it started to rain, when he sat down he fell backwards in the chair and was unconscious for one minute. His daughter states that he developed a blank stare and that it looked as if \"he was staring through her\". During that time he had an episode of urinary incontinence. When he came to he was very confused and did not remember the incident. After the family conferred they estimated this is probably the third time this has happened. Upon patient's arrival to the ED, he only complains of feeling globally weak. He also notes that he has not eaten or drank very much today. Patient had two normal bowel movements yesterday. Patient denies headache, fall, chest pain, nausea, abdominal pain, or cough. Patient denies all other complaints.   I did discuss the case in detail with the ED physician.     Past Medical History:     Past Medical History:   Diagnosis Date     Cancer (H)     hypertension, hyperlipidemia, GERD, and type 2 diabetes  Past Surgical History:   No past surgical history on file.  Social History: , nonsmoker        Family History:  Family history reviewed. NO pertinent family history     Allergies:   Allergies not on file  Medications: To be reconciled        Review of Systems:   A Comprehensive greater than 10 system review of systems was carried out.  Pertinent positives and negatives are noted above.  Otherwise negative for contributory information.        Physical Exam:  Blood pressure 133/84, pulse 75, temperature 96.8  F (36  C), temperature source Oral, resp. rate 16, " "height 1.778 m (5' 10\"), weight 64 kg (141 lb), SpO2 99 %.  Gen: Pleasant in no acute distress.  HEENT: NCAT. EOMI. PERRL.  Neck: Normal inspection. No bruit, JVD or thyromegaly.  Lungs: Normal respiratory effort.Clear to auscultation bilaterally with no crackles or wheezes.  Card: N s1s2. RRR. No M/R/G.  Peripheral pulses present and symmetric.   Skin: No rash. Warm to the touch  Extr: No edema. CMS intact  Psychiatric: Patient alert oriented ×3.  Normal affect  Neurologic: Cranial nerves II-XII are intact.  Sensation normal.  Motor strength 5/5    Data:       Recent Labs  Lab 09/10/17  1932   WBC 7.5   HGB 10.9*   HCT 31.6*   MCV 96   *       Recent Labs  Lab 09/10/17  1932   *   POTASSIUM 4.4   CHLORIDE 95   CO2 23   ANIONGAP 10   *   BUN 42*   CR 1.31*   GFRESTIMATED 52*   GFRESTBLACK 63   HAO 8.3*     No results for input(s): INR in the last 168 hours.    Imaging:   Recent Results (from the past 24 hour(s))   CT Head w/o Contrast    Narrative    CT SCAN OF THE HEAD WITHOUT CONTRAST   9/10/2017 8:07 PM     HISTORY: Loss of consciousness. Fell backwards in a chair and was  unconscious for 1 minute.    TECHNIQUE:  Axial images of the head and coronal reformations without  IV contrast material. Radiation dose for this scan was reduced using  automated exposure control, adjustment of the mA and/or kV according  to patient size, or iterative reconstruction technique.    COMPARISON: None.    FINDINGS:  There is generalized atrophy of the brain.  There is low  attenuation in the white matter of the cerebral hemispheres consistent  with sequelae of small vessel ischemic disease. There is no evidence  of intracranial hemorrhage, mass, acute infarct or anomaly.     The visualized portions of the sinuses and mastoids appear normal.  There is no evidence of trauma.      Impression    IMPRESSION:   1. No acute abnormality.  2. Atrophy of the brain.  White matter changes consistent with  sequelae of small " vessel ischemic disease.      CHRIS CASTRO MD   XR Chest 2 Views    Narrative    CHEST TWO VIEWS  9/10/2017 8:18 PM     HISTORY: Loss of consciousness.    COMPARISON: None.      Impression    IMPRESSION: Hyperinflated lungs with radiolucency consistent with  bullous emphysema, worse on left than on the right. Vague infiltrates  laterally at both lung bases could be acute or chronic. Normal heart  size and pulmonary vascularity. No pleural effusion.    CHRIS CASTRO MD   Chest CT w/o contrast    Narrative    CT CHEST WITHOUT CONTRAST  9/10/2017 9:02 PM    HISTORY:  Question of pneumonia.    TECHNIQUE:  Scans obtained from the apices through the diaphragm  without IV contrast. Radiation dose for this scan was reduced using  automated exposure control, adjustment of the mA and/or kV according  to patient size, or iterative reconstruction technique.    COMPARISON:  Chest x-ray 9/10/2017.    FINDINGS:  No IV contrast. Thoracic aortic and coronary artery  calcifications noted. No acute mediastinal finding. No effusions  identified. Emphysematous changes diffusely. The bilateral lung bases  show some mild interstitial prominence and several pulmonary nodules.  One of the largest is at the lateral right lower lobe measuring 0.9 cm  series 5 image 54. There are several other nodules as well. Scarring  at the bilateral apices. Upper abdomen images show a large infrarenal  abdominal aortic aneurysm that is 7.2 x 6.8 cm on axial series 4 image  74. No perceptible adjacent hemorrhage. Trace ascites. Renal cysts  bilaterally.      Impression    IMPRESSION:  1. Multiple bilateral pulmonary nodules at the lung bases. Some  interstitial prominence also noted. See below for follow up  recommendations. One of the largest nodules is 0.9 cm at the right  lower lobe.  2. No lobar pneumonia identified.  3. Diffuse emphysema.  4. Very large infrarenal abdominal aortic aneurysm that is 7.2 cm. No  adjacent hemorrhage visualized on the  included images. Consider  vascular consultation for management.  5. Trace ascites.    Recommendations for an incidental lung nodule > 8mm:    Low risk patients: Consider follow-up CT in 3 months, PET/CT, and/or  tissue sampling.    High risk patients: Same as for low risk patients.    *Low Risk: Minimal or absent history of smoking or other known risk  factors.  *Nonsolid (ground-glass) or partly solid nodules may require longer  follow-up to exclude indolent adenocarcinoma.  *Recommendations based on Guidelines for the Management of Incidental  Pulmonary Nodules Detected at CT: From the Fleischner Society 2017,  Radiology 2017.       TESSA MONTERO MD   CT Chest/Abdomen/Pelvis w Contrast    Narrative    CT CHEST/ABDOMEN/PELVIS W CONTRAST   9/10/2017 11:27 PM      HISTORY:  Loss of consciousness. Bone marrow cancer.    TECHNIQUE:  CT chest, abdomen and pelvis with intravenous contrast.  Radiation dose for this scan was reduced using automated exposure  control, adjustment of the mA and/or kV according to patient size, or  iterative reconstruction technique.  83mL [id number]      COMPARISON:  CT chest 9/10/2017.    FINDINGS:  Chest: The thoracic aorta is calcified and tortuous. No thoracic  aortic aneurysm or dissection. The heart size is normal. No  mediastinal, hilar or axillary lymph node enlargement. No large  central pulmonary embolus. There is advanced emphysematous disease.  There are few bilateral lung nodules. The largest is in the right  lower lobe measuring 0.9 cm. No pneumothorax or pleural effusion.    Abdomen: Calcified granulomas in the spleen. The liver, gallbladder,  pancreas and adrenal glands are normal in appearance. There are a few  renal cysts bilaterally. The largest is at the posterior aspect of the  left kidney measuring 4.5 cm. There is a large abdominal aortic  aneurysm measuring up to 7 cm AP. The right common iliac artery is  aneurysmal at 4.3 cm. 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 There is no aortic dissection. No abdominal or pelvic lymph node  enlargement.    Pelvis: There are colonic diverticula without acute diverticulitis.  The colon is distended with gas and stool. A few small bowel loops are  mildly dilated without evidence of obstruction. There is a small  amount of ascites. No free intraperitoneal gas.      Impression    IMPRESSION:  1. 7 cm abdominal aortic aneurysm and bilateral common iliac artery  aneurysms. No aortic dissection.  2. Emphysema.  3. A few indeterminate lung nodules measuring up to 0.9 cm. Followup  recommended.   4. Dilated small bowel loops without evidence of obstruction. This may  be an ileus.  5. Small amount of ascites.    Recommendations for an incidental lung nodule > 8mm:    Low risk patients: Consider follow-up CT in 3 months, PET/CT, and/or  tissue sampling.    High risk patients: Same as for low risk patients.    *Low Risk: Minimal or absent history of smoking or other known risk  factors.  *Nonsolid (ground-glass) or partly solid nodules may require longer  follow-up to exclude indolent adenocarcinoma.  *Recommendations based on Guidelines for the Management of Incidental  Pulmonary Nodules Detected at CT: From the Fleischner Society 2017,  Radiology 2017.       Jesica Enamorado MD Pager [phone]

## 2017-09-11 NOTE — CONSULTS
[facility]    Neurology Consultation     Date of Admission:  9/10/2017  Date of Consult (When I saw the patient): 09/11/17    Assessment & Plan   Franklyn Hein is a 84 year old male who was admitted on 9/10/2017. I was asked to see the patient for loss of consciousness concerning for seizure.  The event lasted approximately 1 minute, and was associated with staring, arm posturing, and unresponsiveness.  Previous history of 2 episodes of reduced responsiveness and starting, but nothing this dramatic.  Glucose was mildly elevated by the time they arrived in the ED, and sodium was mildly low at 128.      Given the duration of the event, and the stiffening and associated postural changes, my concern is that this may have been a seizure, rather than alternative considerations like cardiogenic or hypotensive syncope, although occasionally with syncope when a patient is kept upright, we can see unusually long duration loss of consciousness, and even posturing on occasion, and the fact the episode occurred shortly after standing up after lying down for a long period of time does seem consistent with a syncopal event.      EEG is currently pending.   If this is abnormal I would recommend starting an AED.  Even should this be normal, it may be worthwhile to start him on a low dose of an antiepileptic medication given his history.  I will follow up with these results.    I discussed the above diagnosis, assessment, and further testing with the patient.  Total time: 50  Minutes, with more than 50% of the time counseling the patient or coordination of care.       Tom Hernandez MD    Code Status    Full Code        Reason for Consult   Reason for consult: I was asked by Dr. Enamorado to evaluate this patient for possible seizure.      Chief Complaint   Loss of consciousness    History is obtained from the patient and patient's significant other    History of Present Illness   Franklyn Hein is a 84 year  "old male who presents with with a history of multiple myeloma, hypertension, hyperlipidemia, and type 2 diabetes  who presents with brief episode of unresponsiveness associated with urinary incontinence.  He is amnestic to the event, so details were obtained from family members.    According the patient's wife, the patient is currently undergoing treatment for his cancer.  She states that today the patient had been laying down outside and came in because it started to rain, when he sat down he fell backwards in the chair and was unconscious for one minute. His daughter states that he developed a blank stare and that it looked as if \"he was staring through her\".  He also had his arms outstretched above his head.  He had an episode of urinary incontinence. When he came to he was very confused and did not remember the incident. After the family conferred they estimated this is probably the third time this has happened. Upon patient's arrival to the ED, he only complains of feeling globally weak. He also notes that he has not eaten or drank very much today. Patient had two normal bowel movements yesterday. Patient denies headache, fall, chest pain, nausea, abdominal pain, or cough. Patient denies all other complaints.     Past Medical History   I have reviewed this patient's medical history and updated it with pertinent information if needed.   Past Medical History:   Diagnosis Date     Cancer (H)        Past Surgical History   I have reviewed this patient's surgical history and updated it with pertinent information if needed.  No past surgical history on file.    Prior to Admission Medications   Prior to Admission Medications   Prescriptions Last Dose Informant Patient Reported? Taking?   ACYCLOVIR PO   Yes Yes   Sig: Take 400 mg by mouth 2 times daily    AMLODIPINE BESYLATE PO   Yes Yes   Sig: Take 2.5 mg by mouth daily    ATORVASTATIN CALCIUM PO   Yes Yes   Sig: Take 10 mg by mouth daily    DEXAMETHASONE PO   Yes Yes "   Sig: Take 40 mg by mouth Dexamethasone 4mg tabs: take 10 tabs (40mg) daily on days 1-4 and days 9-12 every 21 days with chemo.   LISINOPRIL PO   Yes Yes   Sig: Take 10 mg by mouth daily    LORAZEPAM PO   Yes Yes   Sig: Take 0.5-1 mg by mouth every 6 hours as needed for anxiety or nausea    PROCHLORPERAZINE MALEATE PO   Yes Yes   Sig: Take 5-10 mg by mouth every 6 hours as needed for nausea    metFORMIN (GLUCOPHAGE-XR) 500 MG 24 hr tablet   Yes Yes   Sig: Take 1,000 mg by mouth daily      Facility-Administered Medications: None     Allergies   Allergies not on file    Social History   I have reviewed this patient's social history and updated it with pertinent information if needed. Franklyn Hein      Family History   I have reviewed this patient's family history and updated it with pertinent information if needed.   No family history on file.    Review of Systems   The 10 point Review of Systems is negative other than noted in the HPI or here.     Physical Exam   Temp: 95.1  F (35.1  C) Temp src: Oral BP: 138/74 Pulse: 75 Heart Rate: 68 Resp: 16 SpO2: 97 % O2 Device: None (Room air)    Vital Signs with Ranges  Temp:  [95.1  F (35.1  C)-98.7  F (37.1  C)] 95.1  F (35.1  C)  Pulse:  [75] 75  Heart Rate:  [68-89] 68  Resp:  [16] 16  BP: (118-175)/(62-96) 138/74  SpO2:  [96 %-100 %] 97 %  141 lbs 0 oz    General Appearance:  No acute distress.  Cachectic  Neuro:       Mental Status Exam:    Awake, alert, speech fluent and appropriate       Cranial Nerves:   CN II-XII intact           Motor:   5/5 in all extremities           Reflexes:  2+, symmetric       Sensory:  Grossly intact                   Coordination:   Grossly intact       Gait:  Deferred due to fall risk.     CV: RRR        Data   Results for orders placed or performed during the hospital encounter of 09/10/17 (from the past 24 hour(s))   Glucose by meter   Result Value Ref Range    Glucose 216 (H) 70 - 99 mg/dL   EKG 12 lead   Result Value Ref Range     Interpretation ECG Click View Image link to view waveform and result    Troponin I   Result Value Ref Range    Troponin I ES <0.015 0.000 - 0.045 ug/L   Basic metabolic panel   Result Value Ref Range    Sodium 128 (L) 133 - 144 mmol/L    Potassium 4.4 3.4 - 5.3 mmol/L    Chloride 95 94 - 109 mmol/L    Carbon Dioxide 23 20 - 32 mmol/L    Anion Gap 10 3 - 14 mmol/L    Glucose 225 (H) 70 - 99 mg/dL    Urea Nitrogen 42 (H) 7 - 30 mg/dL    Creatinine 1.31 (H) 0.66 - 1.25 mg/dL    GFR Estimate 52 (L) >60 mL/min/1.7m2    GFR Estimate If Black 63 >60 mL/min/1.7m2    Calcium 8.3 (L) 8.5 - 10.1 mg/dL   CBC with platelets differential   Result Value Ref Range    WBC 7.5 4.0 - 11.0 10e9/L    RBC Count 3.30 (L) 4.4 - 5.9 10e12/L    Hemoglobin 10.9 (L) 13.3 - 17.7 g/dL    Hematocrit 31.6 (L) 40.0 - 53.0 %    MCV 96 78 - 100 fl    MCH 33.0 26.5 - 33.0 pg    MCHC 34.5 31.5 - 36.5 g/dL    RDW 14.3 10.0 - 15.0 %    Platelet Count 118 (L) 150 - 450 10e9/L    Diff Method Manual Differential     % Neutrophils 77.0 %    % Lymphocytes 4.0 %    % Monocytes 5.0 %    % Eosinophils 0.0 %    % Basophils 0.0 %    % Metamyelocytes 3.0 %    % Myelocytes 11.0 %    Nucleated RBCs 1 (H) 0 /100    Absolute Neutrophil 5.8 1.6 - 8.3 10e9/L    Absolute Lymphocytes 0.3 (L) 0.8 - 5.3 10e9/L    Absolute Monocytes 0.4 0.0 - 1.3 10e9/L    Absolute Eosinophils 0.0 0.0 - 0.7 10e9/L    Absolute Basophils 0.0 0.0 - 0.2 10e9/L    Absolute Metamyelocytes 0.2 (H) 0 10e9/L    Absolute Myelocytes 0.8 (H) 0 10e9/L    Absolute Nucleated RBC 0.1     RBC Morphology Consistent with reported results     Platelet Estimate Normal    CT Head w/o Contrast    Narrative    CT SCAN OF THE HEAD WITHOUT CONTRAST   9/10/2017 8:07 PM     HISTORY: Loss of consciousness. Fell backwards in a chair and was  unconscious for 1 minute.    TECHNIQUE:  Axial images of the head and coronal reformations without  IV contrast material. Radiation dose for this scan was reduced using  automated  exposure control, adjustment of the mA and/or kV according  to patient size, or iterative reconstruction technique.    COMPARISON: None.    FINDINGS:  There is generalized atrophy of the brain.  There is low  attenuation in the white matter of the cerebral hemispheres consistent  with sequelae of small vessel ischemic disease. There is no evidence  of intracranial hemorrhage, mass, acute infarct or anomaly.     The visualized portions of the sinuses and mastoids appear normal.  There is no evidence of trauma.      Impression    IMPRESSION:   1. No acute abnormality.  2. Atrophy of the brain.  White matter changes consistent with  sequelae of small vessel ischemic disease.      CHRIS CASTRO MD   XR Chest 2 Views    Narrative    CHEST TWO VIEWS  9/10/2017 8:18 PM     HISTORY: Loss of consciousness.    COMPARISON: None.      Impression    IMPRESSION: Hyperinflated lungs with radiolucency consistent with  bullous emphysema, worse on left than on the right. Vague infiltrates  laterally at both lung bases could be acute or chronic. Normal heart  size and pulmonary vascularity. No pleural effusion.    CHRIS CASTRO MD   Chest CT w/o contrast    Narrative    CT CHEST WITHOUT CONTRAST  9/10/2017 9:02 PM    HISTORY:  Question of pneumonia.    TECHNIQUE:  Scans obtained from the apices through the diaphragm  without IV contrast. Radiation dose for this scan was reduced using  automated exposure control, adjustment of the mA and/or kV according  to patient size, or iterative reconstruction technique.    COMPARISON:  Chest x-ray 9/10/2017.    FINDINGS:  No IV contrast. Thoracic aortic and coronary artery  calcifications noted. No acute mediastinal finding. No effusions  identified. Emphysematous changes diffusely. The bilateral lung bases  show some mild interstitial prominence and several pulmonary nodules.  One of the largest is at the lateral right lower lobe measuring 0.9 cm  series 5 image 54. There are several other  nodules as well. Scarring  at the bilateral apices. Upper abdomen images show a large infrarenal  abdominal aortic aneurysm that is 7.2 x 6.8 cm on axial series 4 image  74. No perceptible adjacent hemorrhage. Trace ascites. Renal cysts  bilaterally.      Impression    IMPRESSION:  1. Multiple bilateral pulmonary nodules at the lung bases. Some  interstitial prominence also noted. See below for follow up  recommendations. One of the largest nodules is 0.9 cm at the right  lower lobe.  2. No lobar pneumonia identified.  3. Diffuse emphysema.  4. Very large infrarenal abdominal aortic aneurysm that is 7.2 cm. No  adjacent hemorrhage visualized on the included images. Consider  vascular consultation for management.  5. Trace ascites.    Recommendations for an incidental lung nodule > 8mm:    Low risk patients: Consider follow-up CT in 3 months, PET/CT, and/or  tissue sampling.    High risk patients: Same as for low risk patients.    *Low Risk: Minimal or absent history of smoking or other known risk  factors.  *Nonsolid (ground-glass) or partly solid nodules may require longer  follow-up to exclude indolent adenocarcinoma.  *Recommendations based on Guidelines for the Management of Incidental  Pulmonary Nodules Detected at CT: From the Fleischner Society 2017,  Radiology 2017.       TESSA MONTERO MD   UA with Microscopic   Result Value Ref Range    Color Urine Straw     Appearance Urine Clear     Glucose Urine 150 (A) NEG^Negative mg/dL    Bilirubin Urine Negative NEG^Negative    Ketones Urine Negative NEG^Negative mg/dL    Specific Gravity Urine 1.009 1.003 - 1.035    Blood Urine Negative NEG^Negative    pH Urine 7.0 5.0 - 7.0 pH    Protein Albumin Urine Negative NEG^Negative mg/dL    Urobilinogen mg/dL 0.0 0.0 - 2.0 mg/dL    Nitrite Urine Negative NEG^Negative    Leukocyte Esterase Urine Negative NEG^Negative    Source Midstream Urine     WBC Urine 1 0 - 2 /HPF    RBC Urine 0 0 - 2 /HPF   CT Chest/Abdomen/Pelvis w  Contrast    Narrative    CT CHEST/ABDOMEN/PELVIS W CONTRAST   9/10/2017 11:27 PM      HISTORY:  Loss of consciousness. Bone marrow cancer.    TECHNIQUE:  CT chest, abdomen and pelvis with intravenous contrast.  Radiation dose for this scan was reduced using automated exposure  control, adjustment of the mA and/or kV according to patient size, or  iterative reconstruction technique.  83mL [id number]      COMPARISON:  CT chest 9/10/2017.    FINDINGS:  Chest: The thoracic aorta is calcified and tortuous. No thoracic  aortic aneurysm or dissection. The heart size is normal. No  mediastinal, hilar or axillary lymph node enlargement. No large  central pulmonary embolus. There is advanced emphysematous disease.  There are few bilateral lung nodules. The largest is in the right  lower lobe measuring 0.9 cm. No pneumothorax or pleural effusion.    Abdomen: Calcified granulomas in the spleen. The liver, gallbladder,  pancreas and adrenal glands are normal in appearance. There are a few  renal cysts bilaterally. The largest is at the posterior aspect of the  left kidney measuring 4.5 cm. There is a large abdominal aortic  aneurysm measuring up to 7 cm AP. The right common iliac artery is  aneurysmal at 4.3 cm. 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 There is no aortic dissection. No abdominal or pelvic lymph node  enlargement.    Pelvis: There are colonic diverticula without acute diverticulitis.  The colon is distended with gas and stool. A few small bowel loops are  mildly dilated without evidence of obstruction. There is a small  amount of ascites. No free intraperitoneal gas.      Impression    IMPRESSION:  1. 7 cm abdominal aortic aneurysm and bilateral common iliac artery  aneurysms. No aortic dissection.  2. Emphysema.  3. A few indeterminate lung nodules measuring up to 0.9 cm. Followup  recommended.   4. Dilated small bowel loops without evidence of obstruction. This may  be an ileus.  5. Small amount of  ascites.    Recommendations for an incidental lung nodule > 8mm:    Low risk patients: Consider follow-up CT in 3 months, PET/CT, and/or  tissue sampling.    High risk patients: Same as for low risk patients.    *Low Risk: Minimal or absent history of smoking or other known risk  factors.  *Nonsolid (ground-glass) or partly solid nodules may require longer  follow-up to exclude indolent adenocarcinoma.  *Recommendations based on Guidelines for the Management of Incidental  Pulmonary Nodules Detected at CT: From the Fleischner Society 2017,  Radiology 2017.    GWENDOLYN LIEBERMAN MD   Glucose by meter   Result Value Ref Range    Glucose 156 (H) 70 - 99 mg/dL   Basic metabolic panel   Result Value Ref Range    Sodium 134 133 - 144 mmol/L    Potassium 4.3 3.4 - 5.3 mmol/L    Chloride 104 94 - 109 mmol/L    Carbon Dioxide 23 20 - 32 mmol/L    Anion Gap 7 3 - 14 mmol/L    Glucose 170 (H) 70 - 99 mg/dL    Urea Nitrogen 31 (H) 7 - 30 mg/dL    Creatinine 1.12 0.66 - 1.25 mg/dL    GFR Estimate 62 >60 mL/min/1.7m2    GFR Estimate If Black 75 >60 mL/min/1.7m2    Calcium 7.4 (L) 8.5 - 10.1 mg/dL   Hemoglobin A1c   Result Value Ref Range    Hemoglobin A1C 8.2 (H) 4.3 - 6.0 %   Glucose by meter   Result Value Ref Range    Glucose 142 (H) 70 - 99 mg/dL   Glucose by meter   Result Value Ref Range    Glucose 170 (H) 70 - 99 mg/dL           Imaging and procedures:  I personally reviewed these images.

## 2017-09-11 NOTE — PROGRESS NOTES
"[facility]  Hospitalist Observation Unit Progress Note  Name: Franklyn Hein    MRN: [id number]  Provider:  Megan Sam DO    Initial presenting complaint/issue to hospital (Diagnosis): episode of unresponsiveness    Late entry - pt seen and examined at 1300         Assessment and Plan:      Summary of Stay: Franklyn Hein is a 84 year old male admitted on 9/10/2017 with overall weakness since last chemo treatment and brief episode of unresponsiveness and urinary incontinence.      Problem List:   1.  Brief unresponsive episode - possible seizure  Ms. Hein presents with family after having a witnessed unresponsive episode last evening.  He was \"weak in the legs\" for the last several days, but then was helped to the couch with episode of unresponsiveness and urinary incontinence (new).    Etiology uncertain --- Neurology consult appreciated.  EEG ordered and is pending.  CT of the head was negative for acute bleed, skull lytic lesions---will order MRI of the head to r/o recent CVA as etiology.    No prior seizure h/o.  No etoh use since treatment for multiple myeloma (7 weeks).      Plan to keep on low-dose keppra until further recommendations by neurology.  Seizure precautions.  No UTI or PNA--no other s/s of active infection.  Will add on TSH and magnesium level.    2.  Pre-renal azotemia, dehydration and hyponatremia  Improved clinical picture and renal function on labs this am.  Ok to d/c IVF now and encourage po intake.  He was definitely clinically dehydrated on admission.  He had no significant abnormality on orthostatic measurements.    Will need to watch his electrolytes and hydration status closely.    3.  Multiple myeloma  Appreciate oncology co-management  He is on steroids 4 days/off 4 days (last dose was Friday)  He is due for treatment on Tuesday/Friday - await recommendations from Dr. Salcedo    4.  Swallowing difficulty/dysphagia  He reports 3 years ago having a " "\"dilatation with EGD\" - symptoms are intermittent and re-occurring, but has not been seen again by GI  Increasingly difficult for him to swallow pills, certain foods like breads, pasta, etc. ---will modify diet to \"soft\" and ask for bedside swallow.  May need to consider GI consult for EGD in close f/u.    5.  7cm Abdominal aortic aneurysm and bilateral common iliac artery aneurysms  No dissection noted.  We have no prior imaging to compare - will need outpt f/u with vascular surgical service and PCP with other old imagings to compare.  Will continue to keep BP low.      6.  HTN  Continue home medications with parameters if no acute CVA found.    7.  Hyperlipidemia  Will await home med rec as pt states recently has changed.    8.  Weakness and deconditioning  PT and SW consult.  Up out of bed!!    9.  Retinal hemorrhage of the left eye?  Follows with Dr. Philippe of the vitreoretinal surgery clinic for treatments ---due for another treatment/re-evaluation the end of this week in the clinic.    DVT Prophylaxis:  -  Will need to add SCD's and increase activity as pt is at high risk for DVT.    Code Status: Full Code  Discharge Dispo: home vs tcu  Estimated Disch Date / # of Days until Discharge: 1-2 days    Observation unit physician is available until 1400.  After 1400, please contact the observation unit Physician Assistant if questions/concerns.        Interval History:      Pt seen and examined with family in the room.  No HA or visual changes.  No etoh use in the recent weeks.  No CP, SOB, dysuria.  BM every 3 days---last 3 days ago.  Appetite poor---but no n/v.  \"weak\" overall and sometime \"legs give out\".  No F/C.  Up to bathroom with assistance.  No dysuria.  No new rashes.                    Physical Exam:      Last Vital Signs:  Temp: 95.1  F (35.1  C) Temp src: Oral BP: 138/74 Pulse: 75 Heart Rate: 68 Resp: 16 SpO2: 97 % O2 Device: None (Room air)         GEN:  Alert, oriented x 3, comfortable, NAD.  HEENT:  " Normocephalic/atraumatic, PERRL, no scleral icterus, no nasal discharge, mouth moist, no oral ulcers or thrush noted.  NECK:  No clear thyromegaly of clear JVD  CV:  Regular rate and rhythm, no murmur to ausc.  S1 + S2 noted, no S3 or S4.  LUNGS:  Clear to auscultation bilaterally.  No rales/rhonchi/wheezing auscultated bilaterally.  No costal retractions bilaterally.  Symmetric chest rise on inhalation noted.  ABD:  Active bowel sounds, soft, non-tender/non-distended.  No rebound/guarding/rigidity.  No masses palpated.  No obvious HSM to exam.  EXT:  No edema or cyanosis bilaterally. No joint synovitis noted.  No calf-tenderness or asymmetry noted.  SKIN:  Dry to touch, no rashes or jaundice noted.  PSYCH:  Mood appropriate, Not tearful or depressed.  Maintains direct eye contact.  NEURO:  No tremors at rest       Medications:      All current medications were reviewed.         Data:      All new lab and imaging data was reviewed.   Labs:    Recent Labs  Lab 09/11/17  0610 09/10/17  1932    128*   POTASSIUM 4.3 4.4   CHLORIDE 104 95   CO2 23 23   ANIONGAP 7 10   * 225*   BUN 31* 42*   CR 1.12 1.31*   GFRESTIMATED 62 52*   GFRESTBLACK 75 63   HAO 7.4* 8.3*       Recent Labs  Lab 09/10/17  1932   WBC 7.5   HGB 10.9*   HCT 31.6*   MCV 96   *       Recent Labs  Lab 09/11/17  0610 09/10/17  1932    128*   POTASSIUM 4.3 4.4   CHLORIDE 104 95   CO2 23 23   ANIONGAP 7 10   * 225*   BUN 31* 42*   CR 1.12 1.31*   GFRESTIMATED 62 52*   GFRESTBLACK 75 63   HAO 7.4* 8.3*     No results for input(s): INR in the last 168 hours.    Recent Labs  Lab 09/10/17  1932   *     No results for input(s): TSH in the last 168 hours.    Recent Labs  Lab 09/10/17  2249   COLOR Straw   APPEARANCE Clear   URINEGLC 150*   URINEBILI Negative   URINEKETONE Negative   SG 1.009   UBLD Negative   URINEPH 7.0   PROTEIN Negative   NITRITE Negative   LEUKEST Negative   RBCU 0   WBCU 1      Recent Imaging:   MRI  pending  EEG pending    Recent Results (from the past 24 hour(s))   CT Head w/o Contrast    Narrative    CT SCAN OF THE HEAD WITHOUT CONTRAST   9/10/2017 8:07 PM     HISTORY: Loss of consciousness. Fell backwards in a chair and was  unconscious for 1 minute.    TECHNIQUE:  Axial images of the head and coronal reformations without  IV contrast material. Radiation dose for this scan was reduced using  automated exposure control, adjustment of the mA and/or kV according  to patient size, or iterative reconstruction technique.    COMPARISON: None.    FINDINGS:  There is generalized atrophy of the brain.  There is low  attenuation in the white matter of the cerebral hemispheres consistent  with sequelae of small vessel ischemic disease. There is no evidence  of intracranial hemorrhage, mass, acute infarct or anomaly.     The visualized portions of the sinuses and mastoids appear normal.  There is no evidence of trauma.      Impression    IMPRESSION:   1. No acute abnormality.  2. Atrophy of the brain.  White matter changes consistent with  sequelae of small vessel ischemic disease.      CHRIS CASTRO MD   XR Chest 2 Views    Narrative    CHEST TWO VIEWS  9/10/2017 8:18 PM     HISTORY: Loss of consciousness.    COMPARISON: None.      Impression    IMPRESSION: Hyperinflated lungs with radiolucency consistent with  bullous emphysema, worse on left than on the right. Vague infiltrates  laterally at both lung bases could be acute or chronic. Normal heart  size and pulmonary vascularity. No pleural effusion.    CHRIS CASTRO MD   Chest CT w/o contrast    Narrative    CT CHEST WITHOUT CONTRAST  9/10/2017 9:02 PM    HISTORY:  Question of pneumonia.    TECHNIQUE:  Scans obtained from the apices through the diaphragm  without IV contrast. Radiation dose for this scan was reduced using  automated exposure control, adjustment of the mA and/or kV according  to patient size, or iterative reconstruction technique.    COMPARISON:   Chest x-ray 9/10/2017.    FINDINGS:  No IV contrast. Thoracic aortic and coronary artery  calcifications noted. No acute mediastinal finding. No effusions  identified. Emphysematous changes diffusely. The bilateral lung bases  show some mild interstitial prominence and several pulmonary nodules.  One of the largest is at the lateral right lower lobe measuring 0.9 cm  series 5 image 54. There are several other nodules as well. Scarring  at the bilateral apices. Upper abdomen images show a large infrarenal  abdominal aortic aneurysm that is 7.2 x 6.8 cm on axial series 4 image  74. No perceptible adjacent hemorrhage. Trace ascites. Renal cysts  bilaterally.      Impression    IMPRESSION:  1. Multiple bilateral pulmonary nodules at the lung bases. Some  interstitial prominence also noted. See below for follow up  recommendations. One of the largest nodules is 0.9 cm at the right  lower lobe.  2. No lobar pneumonia identified.  3. Diffuse emphysema.  4. Very large infrarenal abdominal aortic aneurysm that is 7.2 cm. No  adjacent hemorrhage visualized on the included images. Consider  vascular consultation for management.  5. Trace ascites.    Recommendations for an incidental lung nodule > 8mm:    Low risk patients: Consider follow-up CT in 3 months, PET/CT, and/or  tissue sampling.    High risk patients: Same as for low risk patients.    *Low Risk: Minimal or absent history of smoking or other known risk  factors.  *Nonsolid (ground-glass) or partly solid nodules may require longer  follow-up to exclude indolent adenocarcinoma.  *Recommendations based on Guidelines for the Management of Incidental  Pulmonary Nodules Detected at CT: From the Fleischner Society 2017,  Radiology 2017.       TESSA MONTERO MD   CT Chest/Abdomen/Pelvis w Contrast    Narrative    CT CHEST/ABDOMEN/PELVIS W CONTRAST   9/10/2017 11:27 PM      HISTORY:  Loss of consciousness. Bone marrow cancer.    TECHNIQUE:  CT chest, abdomen and pelvis with  intravenous contrast.  Radiation dose for this scan was reduced using automated exposure  control, adjustment of the mA and/or kV according to patient size, or  iterative reconstruction technique.  83mL [id number]      COMPARISON:  CT chest 9/10/2017.    FINDINGS:  Chest: The thoracic aorta is calcified and tortuous. No thoracic  aortic aneurysm or dissection. The heart size is normal. No  mediastinal, hilar or axillary lymph node enlargement. No large  central pulmonary embolus. There is advanced emphysematous disease.  There are few bilateral lung nodules. The largest is in the right  lower lobe measuring 0.9 cm. No pneumothorax or pleural effusion.    Abdomen: Calcified granulomas in the spleen. The liver, gallbladder,  pancreas and adrenal glands are normal in appearance. There are a few  renal cysts bilaterally. The largest is at the posterior aspect of the  left kidney measuring 4.5 cm. There is a large abdominal aortic  aneurysm measuring up to 7 cm AP. The right common iliac artery is  aneurysmal at 4.3 cm. 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 There is no aortic dissection. No abdominal or pelvic lymph node  enlargement.    Pelvis: There are colonic diverticula without acute diverticulitis.  The colon is distended with gas and stool. A few small bowel loops are  mildly dilated without evidence of obstruction. There is a small  amount of ascites. No free intraperitoneal gas.      Impression    IMPRESSION:  1. 7 cm abdominal aortic aneurysm and bilateral common iliac artery  aneurysms. No aortic dissection.  2. Emphysema.  3. A few indeterminate lung nodules measuring up to 0.9 cm. Followup  recommended.   4. Dilated small bowel loops without evidence of obstruction. This may  be an ileus.  5. Small amount of ascites.    Recommendations for an incidental lung nodule > 8mm:    Low risk patients: Consider follow-up CT in 3 months, PET/CT, and/or  tissue sampling.    High risk patients: Same as for  low risk patients.    *Low Risk: Minimal or absent history of smoking or other known risk  factors.  *Nonsolid (ground-glass) or partly solid nodules may require longer  follow-up to exclude indolent adenocarcinoma.  *Recommendations based on Guidelines for the Management of Incidental  Pulmonary Nodules Detected at CT: From the Fleischner Society 2017,  Radiology 2017.    GWENDOLYN LIEBERMAN MD

## 2017-09-11 NOTE — PLAN OF CARE
Problem: Goal Outcome Summary  Goal: Goal Outcome Summary     PT: Orders received and chart reviewed. Eval completed. Patient admitted with overall weakness since last chemo treatment and brief episode of unresponsiveness and urinary incontinence.  Prior to admit patient living at home with spouse, 2 stairs to enter, does not utilize basement, mod indep with mobility using SEC or 4WW outside of home for longer distances. Pt reports indep with ADLs and self cares, both he and his wife are still driving, wife does household cleaning, cooking, shopping, pt takes care of yard with riding mower. Pt is reporting decreased activity with ongoing treatments, feeling weaker than usual, more SOB with limited activity.  Discharge Planner PT   Patient plan for discharge: home, may be agreeable to continued therapy  Current status: Pt completes bed mobility with SBA and transfers w/ CGA and SEC. Pt ambulated 100ft with SEC and CGA, slow but steady pace with gait speed decreasing a distance progress, pre-activity VS /40, 96 SaO2, HR 78, post 133/43, 94 Sao2, 86HR, pt visibly fatigued with short distance, inc SOB and RR.   Barriers to return to prior living situation: decreased functional endurance, need for assistance with mobility  Recommendations for discharge: Home with home health, followed by referral to OP Cancer rehab  Rationale for recommendations: decreased endurance with functional mobility, need for strengthening to return to prior level of independence       Entered by: Carolyn Cordon 09/11/2017 3:47 PM

## 2017-09-11 NOTE — PLAN OF CARE
Problem: Discharge Planning  Goal: Discharge Planning (Adult, OB, Behavioral, Peds)  Outcome: Improving  PRIMARY DIAGNOSIS: Seizure  OUTPATIENT/OBSERVATION GOALS TO BE MET BEFORE DISCHARGE:  1. Orthostatic performed: No     2. Diagnostic testing complete & at baseline neurologic testing: No, Neurology consult     3. Cleared by consultants (if involved): No     4. Interpretation of cardiac rhythm per telemetry tech: SR with long QT/Tall T-waves 85     5. Tolerating adequate PO diet and medications: Yes     6. Return to near baseline physical activity or neurologic status: No currently A-1 with cane, Ind with Cane a baseline  Discharge Planner Nurse   Safe discharge environment identified: Yes  Barriers to discharge: not once medically cleared.        Entered by: Louise Macdonald 09/11/2017 4:59 AM     Please review provider order for any additional goals.   Nurse to notify provider when observation goals have been met and patient is ready for discharge.     Patient is alert and oriented x4, forgetfull at times, [place]. Seizure precautions in place. Neuro's intact. Tele monitoring in place, Neurology consult in place. Granddaughter at bedside for the night. IV fluids infusing. Will continue to monitor, assess, and offer supportive cares.

## 2017-09-11 NOTE — PROGRESS NOTES
HEMATOLOGY/ONCOLOGY CONSULTATION:  Please see recent outpatient clinic note from 9/5/2017 below      HPI: I have been asked to see Mr. Franklyn Hein by his admitting hospitalist (Dr. Samia Garland) regarding his history of myeloma and recent apparent seizure.    Mr. Franklyn Hein was well in clinic just last week when I saw him on 9/5/2017.  At that time, he was cleared to commence with a third cycle of bortezomib (Velcade).  He was informed his disease was responding to treatment based on monoclonal protein indices.  He was however moderately anemic with a hemoglobin of 9.0-9.3.      Unfortunately, he had a brief unresponsive episode thought possibly to represent seizure that now brings him to the hospital.  Of note, he had been feeling increasingly weak and fatigued likely as result of his anemia.  Neurology has now seen the patient ordered an EEG which is pending.  Thankfully a CT scan of the head was negative for acute bleed nor any obvious plasmacytoid lesions in his skull that could have caused the episode.  An MRI is now pending as well.        REVIEW OF SYSTEMS: Today is fairly unremarkable with no fevers chills night sweats confusion (he passed a Mini-Mental status exam apart from missing the date he was able to name the month and year).  He is also without bulky lymphadenopathy, unintentional weight loss, bone pain, shortness of breath, cough, hemoptysis, scleral icterus, jaundice, or ongoing seizure.      PHYSICAL EXAMINATION:    VITAL SIGNS: Currently stable/afebrile  GENERAL: alert and oriented x3, and in no apparent distress at rest.   HEENT: PERRLA. EOMI. Sclerae are nonicteric. Mucous membranes are moist with no thrush.  NECK/LYMPH: No bulky lymphadenopathy, JVD, bruits, or thyromegaly.   CV: Regular rate and rhythm without murmurs or rubs.  LUNGS: Clear to auscultation bilaterally with no wheezes, rhonchi, or rales.  ABDOMEN: Soft and nontender with no tense ascites or peritoneal signs,  and no organomegaly.  EXTREMITIES: Without any clubbing, cyanosis, or unequal edema.  SKIN: Without rashes, petechiae, or areas of cellulitis.  NEURO: Grossly non-focal. CN II-XII intact        LABORATORY DATA: Hemoglobin 10.9 platelets 118,000 white count 7500 differential slightly left shifted. Creatinine and calcium levels normal..        IMAGING:  CT SCAN OF THE HEAD WITHOUT CONTRAST   9/10/2017 8:07 PM      HISTORY: Loss of consciousness. Fell backwards in a chair and was  unconscious for 1 minute.     TECHNIQUE:  Axial images of the head and coronal reformations without  IV contrast material. Radiation dose for this scan was reduced using  automated exposure control, adjustment of the mA and/or kV according  to patient size, or iterative reconstruction technique.     COMPARISON: None.     FINDINGS:  There is generalized atrophy of the brain.  There is low  attenuation in the white matter of the cerebral hemispheres consistent  with sequelae of small vessel ischemic disease. There is no evidence  of intracranial hemorrhage, mass, acute infarct or anomaly.      The visualized portions of the sinuses and mastoids appear normal.  There is no evidence of trauma.         IMPRESSION:   1. No acute abnormality.  2. Atrophy of the brain.  White matter changes consistent with  sequelae of small vessel ischemic disease.      ASSESSMENT AND PLAN: 84-year-old white male with syncopal/seizure like episode likely exacerbated by anemia from myeloma treatment- As to the etiology of Mr. Hein's syncopal/seizure episode, this is somewhat uncertain.  I suspect he may have simply passed out although neurology is now further evaluating him for seizure focus through EEG.  We will await his MRI of the brain as well to further elucidate any potential cause for his event.    His Velcade for now will be held and/or rescheduled.  Should his hemoglobin fall below 9 during this hospital stay, may also be prudent to consider giving him  PRBC transfusion.    I appreciate the care provided to him by both the hospitalist and consulting neurologist.        Dom Salcedo M.D., M.S.  Medical Oncologist-Hematologist  [facility]      (This note was transcribed using voice recognition software and as a result may contain minor grammatical errors and unintended word substitutions.)    Outpatient hematology/oncology note from 9/5/2017:  Mr. Hein is an 84-year-old male who presents to the clinic for further follow-up regarding his diagnosis of multiple myeloma.        During our appointment on July 21st , in the presence of his family, we discussed updated hematologic and oncologic issues regarding his diagnosis of active multiple myeloma.  Given that he was not a bone marrow transplant/autologous stem cell transplant candidate with an age greater than 75, I recommended against combination systemic therapy and instead offered him Velcade monotherapy (with Decadron).  He is now initiated such treatment completed his second cycle.     During Mr. Hein s last visit on 8/15 he reports havimg done well with initiation of Velcade/bortezomib and Decadron for his multiple myeloma.     He presents for further oncologic evaluation today prior to his third cycle of Velcade if deemed fit.        Review of systems is notable for moderate fatigue, chills, and weakness.  He denies neuropathy, chest pain. hematochezia, epistaxis, and hemoptysis.  A 14-point ROS has been performed and is otherwise negative.       Past medical, family, and social histories are otherwise unchanged.     PHYSICAL EXAMINATION:  VITALS SIGNS: Afebrile. Pulse 73. Blood pressure 128/72. Weight 141.6 (-1.4) pounds.  GENERAL: He is alert and oriented x3 and in no apparent distress. He is not pale and does not appear short of breath.  HEENT: PERRLA. EOMI. Sclerae are noicteric. Mucous membranes are moist with not thrush.  NECK/LYMPH: No bulky lymphadenopathy, JVD, bruits, or  thyromegaly.  LYMPH: No cervical, supraclavicular, infraclavicular, axillary, or inguinal lymphadenopathy.  CARDIOVASCULAR: Regular rate and rhythm without murmurs or rubs.  LUNGS: Clear to auscultation bilaterally with no wheezes, rhonchi, or rales.  ABDOMEN: Soft and non-tender with no tense ascites or peritoneal signs.  EXTREMITIES: Without any clubbing, cyanosis, or unequal edema.     LABS: CBC from today includes WBC 5.5, HGB 9.0, HCT 26.5, and ,000. His CMP from 8/25 was grossly normal other than a glucose of 337, BUN 32, Potassium 127, Chloride 93, and GFR 53.3.   His K/L light chain ratio was 2.36.       IMAGING:  None new     ASSESSMENT/PLAN: 84-year-old white male with multiple myeloma - Mr. Franklyn Hein seems to be doing fairly well from an oncologic standpoint with no treatment related toxicity from his Velcade (bortezomib) apart from some increasing chemotherapy-induced anemia (EDUARDO).  Given his hemoglobin of 9.0, but still normal white blood cell count, platelets, and other plasmacytoid disease indices (including his now very low M spike of only 0.4 g/dL), I will not yet employ a dose reduction and simply provide him cycle 3 day 1 Velcade today.    For his anemia, however, which is causing him some increased fatigue and related symptomatology, he will initiate Aranesp (darbepoetin) at a dose of 500 mcg subcu/IV every 3 weeks in hopes of improving his hematopoietic response and preventing need for PRBC transfusion.      Hopefully, other elements of his CBC including his white blood cell count and platelets will allow continued treatment through the intended 4-6 total cycles of therapy.    As to his follow-up, I will see him again in 3 weeks time while he continues to meet with our chemotherapy nurses on treatment days.  As mentioned previously, he understands that at his advanced age, there are no plans for consideration of autologous stem cell transplant (SCT).        Dom Salcedo M.D.,  M.S.  Medical Oncologist-Hematologist  [facility]    (This note was transcribed using voice recognition software and as a result may contain minor grammatical errors and unintended word substitutions.)

## 2017-09-11 NOTE — PLAN OF CARE
Problem: Discharge Planning  Goal: Discharge Planning (Adult, OB, Behavioral, Peds)  ROOM # 224     Living Situation (if not independent, order SW consult):lives ind with wife  Facility name:  : [facility] (daughter)     Activity level at baseline: ind with cane  Activity level on admit: A-1 with cane        Patient registered to observation; given Patient Bill of Rights; given the opportunity to ask questions about observation status and their plan of care.  Patient has been oriented to the observation room, bathroom and call light is in place.     Discussed discharge goals and expectations with patient/family.

## 2017-09-11 NOTE — PHARMACY-ADMISSION MEDICATION HISTORY
Admission medication history interview status for this patient is complete. See [facility] admission navigator for allergy information, prior to admission medications and immunization status.     Medication history interview source(s):Family and [facility] pharmacy  Medication history resources (including written lists, pill bottles, clinic record):None  Primary pharmacy: [facility] [place]    Changes made to PTA medication list:  Added: none  Deleted: none  Changed: all - added doses and freq, Metformin - also changed to XR.    Actions taken by pharmacist (provider contacted, etc): called [facility] to clarify all doses, family did not know.     Additional medication history information: ONC MD - Dr. Denisse Fernandes. Primary MD - Dr. Gato Charles @[facility].     Medication reconciliation/reorder completed by provider prior to medication history? No    For patients on insulin therapy: No     Prior to Admission medications    Medication Sig Taking?   metFORMIN (GLUCOPHAGE-XR) 500 MG 24 hr tablet Take 1,000 mg by mouth daily Yes   PROCHLORPERAZINE MALEATE PO Take 5-10 mg by mouth every 6 hours as needed for nausea  Yes   LISINOPRIL PO Take 10 mg by mouth daily  Yes   LORAZEPAM PO Take 0.5-1 mg by mouth every 6 hours as needed for anxiety or nausea  Yes   ATORVASTATIN CALCIUM PO Take 10 mg by mouth daily  Yes   ACYCLOVIR PO Take 400 mg by mouth 2 times daily  Yes   AMLODIPINE BESYLATE PO Take 2.5 mg by mouth daily  Yes   DEXAMETHASONE PO Take 40 mg by mouth Dexamethasone 4mg tabs: take 10 tabs (40mg) daily on days 1-4 and days 9-12 every 21 days with chemo. Yes

## 2017-09-11 NOTE — PROGRESS NOTES
09/11/17 1516   Quick Adds   Type of Visit Initial PT Evaluation   Living Environment   Lives With spouse   Living Arrangements house   Number of Stairs to Enter Home 2   Number of Stairs Within Home 12   Self-Care   Usual Activity Tolerance moderate   Current Activity Tolerance fair   Regular Exercise no   Activity/Exercise/Self-Care Comment indep w ADLS, drives,     Functional Level Prior   Ambulation 1-->assistive equipment   Transferring 1-->assistive equipment   Toileting 0-->independent   Bathing 0-->independent   Dressing 0-->independent   Fall history within last six months no   Which of the above functional risks had a recent onset or change? ambulation   Prior Functional Level Comment mod indep with SEC for mobility, indep with ADLS   General Information   Onset of Illness/Injury or Date of Surgery - Date 09/11/17   Referring Physician Flaco   Patient/Family Goals Statement home   Pertinent History of Current Problem (include personal factors and/or comorbidities that impact the POC) Patient admitted with overall weakness since last chemo treatment and brief episode of unresponsiveness and urinary incontinence. Recent chemo tx   Precautions/Limitations fall precautions   General Observations Pt concerned about wife going home tonight, wanted daughter to stay with her; unlikely to agree to TCU   Cognitive Status Examination   Orientation orientation to person, place and time   Level of Consciousness alert   Follows Commands and Answers Questions 100% of the time   Personal Safety and Judgment at risk behaviors demonstrated   Range of Motion (ROM)   ROM Comment LE ROM is WFL   Strength   Strength Comments 4-/5 LE strength grossly   Bed Mobility   Bed Mobility Comments SBA   Transfer Skills   Transfer Comments CGA   Gait   Gait Comments CGA with SEC, decresed step length and reduced gait speed, slowing more with fatigue as distance progressed   Balance   Balance Comments generally stable with  "static standing, used SEC at baseline    General Therapy Interventions   Planned Therapy Interventions bed mobility training;gait training;strengthening;transfer training   Clinical Impression   Criteria for Skilled Therapeutic Intervention yes, treatment indicated   PT Diagnosis Difficulty walking   Influenced by the following impairments impaired functional mobility   Functional limitations due to impairments impaired functional mobility   Clinical Presentation Evolving/Changing   Clinical Presentation Rationale clinical observation; chemo pt with COPD   Clinical Decision Making (Complexity) Moderate complexity   Therapy Frequency` daily   Predicted Duration of Therapy Intervention (days/wks) 5 days   Anticipated Discharge Disposition Home with Home Therapy;Home with Outpatient Therapy   Risk & Benefits of therapy have been explained Yes   Patient, Family & other staff in agreement with plan of care Yes   Therapy Certification   Start of care date 09/11/17   Certification date from 09/11/17   Certification date to 09/16/17   Medical Diagnosis Difficulty walking   Certification I certify the need for these services furnished under this plan of treatment and while under my care.  (Physician co-signature of this document indicates review and certification of the therapy plan).    [facility] [employer] TM \"6 Clicks\"   2016, Trustees of [facility], under license to [employer].  All rights reserved.   6 Clicks Short Forms Basic Mobility Inpatient Short Form   [facility] [employer]  \"6 Clicks\" V.2 Basic Mobility Inpatient Short Form   1. Turning from your back to your side while in a flat bed without using bedrails? 3 - A Little   2. Moving from lying on your back to sitting on the side of a flat bed without using bedrails? 3 - A Little   3. Moving to and from a bed to a chair (including a wheelchair)? 3 - A Little   4. Standing up from a chair using your arms (e.g., wheelchair, or bedside chair)? 3 - A " Little   5. To walk in hospital room? 3 - A Little   6. Climbing 3-5 steps with a railing? 3 - A Little   Basic Mobility Raw Score (Score out of 24.Lower scores equate to lower levels of function) 18   Total Evaluation Time   Total Evaluation Time (Minutes) 15

## 2017-09-12 ENCOUNTER — APPOINTMENT (OUTPATIENT)
Dept: PHYSICAL THERAPY | Facility: CLINIC | Age: 82
End: 2017-09-12
Attending: INTERNAL MEDICINE
Payer: MEDICARE

## 2017-09-12 VITALS
HEIGHT: 70 IN | DIASTOLIC BLOOD PRESSURE: 55 MMHG | HEART RATE: 75 BPM | BODY MASS INDEX: 20.19 KG/M2 | RESPIRATION RATE: 16 BRPM | SYSTOLIC BLOOD PRESSURE: 101 MMHG | TEMPERATURE: 96.5 F | OXYGEN SATURATION: 98 % | WEIGHT: 141 LBS

## 2017-09-12 LAB
ALBUMIN SERPL-MCNC: 2.8 G/DL (ref 3.4–5)
ALP SERPL-CCNC: 49 U/L (ref 40–150)
ALT SERPL W P-5'-P-CCNC: 15 U/L (ref 0–70)
ANION GAP SERPL CALCULATED.3IONS-SCNC: 6 MMOL/L (ref 3–14)
AST SERPL W P-5'-P-CCNC: 19 U/L (ref 0–45)
BASOPHILS # BLD AUTO: 0 10E9/L (ref 0–0.2)
BASOPHILS NFR BLD AUTO: 0 %
BILIRUB SERPL-MCNC: 0.6 MG/DL (ref 0.2–1.3)
BUN SERPL-MCNC: 17 MG/DL (ref 7–30)
CALCIUM SERPL-MCNC: 7.2 MG/DL (ref 8.5–10.1)
CHLORIDE SERPL-SCNC: 105 MMOL/L (ref 94–109)
CO2 SERPL-SCNC: 23 MMOL/L (ref 20–32)
CREAT SERPL-MCNC: 0.93 MG/DL (ref 0.66–1.25)
DIFFERENTIAL METHOD BLD: ABNORMAL
EOSINOPHIL # BLD AUTO: 0 10E9/L (ref 0–0.7)
EOSINOPHIL NFR BLD AUTO: 0 %
ERYTHROCYTE [DISTWIDTH] IN BLOOD BY AUTOMATED COUNT: 14.6 % (ref 10–15)
GFR SERPL CREATININE-BSD FRML MDRD: 77 ML/MIN/1.7M2
GLUCOSE BLDC GLUCOMTR-MCNC: 104 MG/DL (ref 70–99)
GLUCOSE BLDC GLUCOMTR-MCNC: 160 MG/DL (ref 70–99)
GLUCOSE BLDC GLUCOMTR-MCNC: 99 MG/DL (ref 70–99)
GLUCOSE SERPL-MCNC: 90 MG/DL (ref 70–99)
HCT VFR BLD AUTO: 26.6 % (ref 40–53)
HGB BLD-MCNC: 9.2 G/DL (ref 13.3–17.7)
LYMPHOCYTES # BLD AUTO: 1 10E9/L (ref 0.8–5.3)
LYMPHOCYTES NFR BLD AUTO: 15 %
MCH RBC QN AUTO: 33.3 PG (ref 26.5–33)
MCHC RBC AUTO-ENTMCNC: 34.6 G/DL (ref 31.5–36.5)
MCV RBC AUTO: 96 FL (ref 78–100)
METAMYELOCYTES # BLD: 1 10E9/L
METAMYELOCYTES NFR BLD MANUAL: 14 %
MONOCYTES # BLD AUTO: 0.1 10E9/L (ref 0–1.3)
MONOCYTES NFR BLD AUTO: 2 %
NEUTROPHILS # BLD AUTO: 4.7 10E9/L (ref 1.6–8.3)
NEUTROPHILS NFR BLD AUTO: 69 %
NRBC # BLD AUTO: 0.7 10*3/UL
NRBC BLD AUTO-RTO: 10 /100
OVALOCYTES BLD QL SMEAR: SLIGHT
PLATELET # BLD AUTO: 68 10E9/L (ref 150–450)
PLATELET # BLD EST: ABNORMAL 10*3/UL
POTASSIUM SERPL-SCNC: 3.9 MMOL/L (ref 3.4–5.3)
PROT SERPL-MCNC: 5 G/DL (ref 6.8–8.8)
RBC # BLD AUTO: 2.76 10E12/L (ref 4.4–5.9)
SODIUM SERPL-SCNC: 134 MMOL/L (ref 133–144)
WBC # BLD AUTO: 6.8 10E9/L (ref 4–11)

## 2017-09-12 PROCEDURE — 99217 ZZC OBSERVATION CARE DISCHARGE: CPT | Performed by: HOSPITALIST

## 2017-09-12 PROCEDURE — 25000132 ZZH RX MED GY IP 250 OP 250 PS 637: Mod: GY | Performed by: INTERNAL MEDICINE

## 2017-09-12 PROCEDURE — 00000146 ZZHCL STATISTIC GLUCOSE BY METER IP

## 2017-09-12 PROCEDURE — 97116 GAIT TRAINING THERAPY: CPT | Mod: GP | Performed by: PHYSICAL THERAPIST

## 2017-09-12 PROCEDURE — 25000132 ZZH RX MED GY IP 250 OP 250 PS 637: Mod: GY | Performed by: PHYSICIAN ASSISTANT

## 2017-09-12 PROCEDURE — 97110 THERAPEUTIC EXERCISES: CPT | Mod: GP | Performed by: PHYSICAL THERAPIST

## 2017-09-12 PROCEDURE — 85025 COMPLETE CBC W/AUTO DIFF WBC: CPT | Performed by: INTERNAL MEDICINE

## 2017-09-12 PROCEDURE — 80053 COMPREHEN METABOLIC PANEL: CPT | Performed by: INTERNAL MEDICINE

## 2017-09-12 PROCEDURE — A9270 NON-COVERED ITEM OR SERVICE: HCPCS | Mod: GY | Performed by: PHYSICIAN ASSISTANT

## 2017-09-12 PROCEDURE — G0378 HOSPITAL OBSERVATION PER HR: HCPCS

## 2017-09-12 PROCEDURE — 40000193 ZZH STATISTIC PT WARD VISIT: Performed by: PHYSICAL THERAPIST

## 2017-09-12 PROCEDURE — A9270 NON-COVERED ITEM OR SERVICE: HCPCS | Mod: GY | Performed by: INTERNAL MEDICINE

## 2017-09-12 PROCEDURE — 36415 COLL VENOUS BLD VENIPUNCTURE: CPT | Performed by: INTERNAL MEDICINE

## 2017-09-12 RX ORDER — LEVETIRACETAM 250 MG/1
250 TABLET ORAL 2 TIMES DAILY
Qty: 60 TABLET | Refills: 0 | Status: SHIPPED | OUTPATIENT
Start: 2017-09-12 | End: 2018-03-19

## 2017-09-12 RX ORDER — LISINOPRIL 5 MG/1
5 TABLET ORAL DAILY
Status: DISCONTINUED | OUTPATIENT
Start: 2017-09-13 | End: 2017-09-12 | Stop reason: HOSPADM

## 2017-09-12 RX ADMIN — ACYCLOVIR 400 MG: 400 TABLET ORAL at 12:05

## 2017-09-12 RX ADMIN — LEVETIRACETAM 250 MG: 250 TABLET, FILM COATED ORAL at 04:09

## 2017-09-12 RX ADMIN — ACYCLOVIR 400 MG: 400 TABLET ORAL at 00:13

## 2017-09-12 NOTE — PROGRESS NOTES
[facility]  Met with pt and family to discuss plans for HC.  Pt to be discharged home today and has agreed to have [facility] follow with services of SN and PT.  Central Intake processing referral.  Pt and family verbalized understanding that initial visit is scheduled for 9/13 or 9/14/17.  Pt has 24 hour phone number for [facility] for any questions or concerns.

## 2017-09-12 NOTE — PLAN OF CARE
Problem: Goal Outcome Summary  Goal: Goal Outcome Summary  Outcome: Improving  PRIMARY DIAGNOSIS: SYNCOPE  OUTPATIENT/OBSERVATION GOALS TO BE MET BEFORE DISCHARGE:  1. Orthostatic performed: Not ordered                   2. Diagnostic testing complete & at baseline neurologic testing: N/A     3. Cleared by consultants (if involved): Yes     4. Interpretation of cardiac rhythm per telemetry tech: SR     5. Tolerating adequate PO diet and medications: Yes     6. Return to near baseline physical activity or neurologic status: Yes     Pt is inquiring about recent EEG results.  Reports no dizziness when up to bathroom, Voiding in adequate amounts, further reports that his appetite is poor and has not had much intake.  Currently ordering breakfast now.  .  No bowel movement for a couple of days.  + flatus.  Pt. Does not feel constipated.  Up with SBA and cane.  Hgb 9.2 this AM.     Discharge Planner Nurse   Safe discharge environment identified: Pending  Barriers to discharge: No       Entered by: Felisha Bañuelos 09/12/2017 10:08 AM     Please review provider order for any additional goals.   Nurse to notify provider when observation goals have been met and patient is ready for discharge.

## 2017-09-12 NOTE — PROCEDURES
ELECTROENCEPHALOGRAM       ORDERING PHYSICIAN:    Jesica Enamorado                          EEG #     LOCATION:   Observation Unit - #[id number]           TEST TYPE:   Routine EEG       CONDITION OF PATIENT:   awake       REASON FOR TEST:   Evaluate for underlying seizure activity.   HISTORY:  Multiple myeloma, hypertension, hyperlipidemia, type 2 diabetes, emphysema       MEDICATIONS:   Keppra           CLINICAL FINDINGS:  There is mild generalized slowing noted during the recording, with a posterior dominant rhythm of approximately 6-8 hertz noted during periods of quiet wakefulness, there is intermittent independent and infrequent left and right temporal slowing noted throughout the recording.  There are no epileptiform discharges or electrographic seizures.   This is an awake only EEG.  Photic stimulation was done but hyperventilation was not due to underlying history of emphysema.  Activating maneuvers did not produce any abnormalities on EEG.   IMPRESSION:  This is a minimally abnormal EEG due to the presence of mild generalized slowing as well as independent infrequent left and right temporal slowing.  These findings are overall a relatively nonspecific and could be seen in a mild encephalopathy.  There is nothing to suggest an underlying seizure disorder, but this cannot be precluded based on the EEG alone and further clinical correlation is recommended.         EVIE COHEN MD             D: 2017 15:21   T: 2017 08:02   MT: [id number]      Name:     ALEJANDRA TALBOT   MRN:      [id number]        Account:        [id number]   :      10/28/1932           Procedure Date: 2017      Document: [id number]       cc: Haseeb Cole MD

## 2017-09-12 NOTE — PROGRESS NOTES
"HEME-ONC PROGRESS NOTE    History: Unremarkable with no new fevers chills night sweats or other signs of systemic infection bleeding or thrombosis    Physical Exam:  VS: Blood pressure 101/55, pulse 75, temperature 96.2  F (35.7  C), temperature source Oral, resp. rate (P) 16, height 1.778 m (5' 10\"), weight 64 kg (141 lb), SpO2 (P) 98 %.  GEN- Alert and orientedx3 in NAD.  CV- RRR no murmurs or rubs  LUNGS-Clear to auscultation bilaterally  ABD-soft, NTND, normoactive bowels sounds, no hepatosplenomegaly, masses, or ascites.  EXTREM- no clubbing, cyanosis, or edema  SKIN- no rashes, petechiae, or purpura    LABS:   CBC Results:   Recent Labs   Lab Test  09/12/17   0538   WBC  6.8   RBC  2.76*   HGB  9.2*   HCT  26.6*   MCV  96   MCH  33.3*   MCHC  34.6   RDW  14.6   PLT  68*       Last Basic Metabolic Panel:  Lab Results   Component Value Date     09/12/2017      Lab Results   Component Value Date    POTASSIUM 3.9 09/12/2017     Lab Results   Component Value Date    CHLORIDE 105 09/12/2017     Lab Results   Component Value Date    HAO 7.2 09/12/2017     Lab Results   Component Value Date    CO2 23 09/12/2017     Lab Results   Component Value Date    BUN 17 09/12/2017     Lab Results   Component Value Date    CR 0.93 09/12/2017     Lab Results   Component Value Date    GLC 90 09/12/2017       Liver Function Studies:   Recent Labs   Lab Test  09/12/17   0538   PROTTOTAL  5.0*   ALBUMIN  2.8*   BILITOTAL  0.6   ALKPHOS  49   AST  19   ALT  15       Imaging:     CT CHEST/ABDOMEN/PELVIS W CONTRAST   9/10/2017 11:27 PM       HISTORY:  Loss of consciousness. Bone marrow cancer.     COMPARISON:  CT chest 9/10/2017.     FINDINGS:  Chest: The thoracic aorta is calcified and tortuous. No thoracic  aortic aneurysm or dissection. The heart size is normal. No  mediastinal, hilar or axillary lymph node enlargement. No large  central pulmonary embolus. There is advanced emphysematous disease.  There are few bilateral lung " nodules. The largest is in the right  lower lobe measuring 0.9 cm. No pneumothorax or pleural effusion.     Abdomen: Calcified granulomas in the spleen. The liver, gallbladder,  pancreas and adrenal glands are normal in appearance. There are a few  renal cysts bilaterally. The largest is at the posterior aspect of the  left kidney measuring 4.5 cm. There is a large abdominal aortic  aneurysm measuring up to 7 cm AP. The right common iliac artery is  aneurysmal at 4.3 cm. 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 There is no aortic dissection. No abdominal or pelvic lymph node  enlargement.     Pelvis: There are colonic diverticula without acute diverticulitis.  The colon is distended with gas and stool. A few small bowel loops are  mildly dilated without evidence of obstruction. There is a small  amount of ascites. No free intraperitoneal gas.         IMPRESSION:  1. 7 cm abdominal aortic aneurysm and bilateral common iliac artery  aneurysms. No aortic dissection.  2. Emphysema.  3. A few indeterminate lung nodules measuring up to 0.9 cm. Followup  recommended.   4. Dilated small bowel loops without evidence of obstruction. This may  be an ileus.  5. Small amount of ascites.    MR BRAIN W/O & W CONTRAST 9/11/2017 7:07 PM      HISTORY: possible seizure     TECHNIQUE: Multiplanar, multisequence MRI of the brain without and  with 6 mL Gadavist  IV contrast material.     COMPARISON: None.     FINDINGS:  There is generalized atrophy of the brain.  White matter  changes are present in the cerebral hemispheres that are consistent  with small vessel ischemic disease in this age patient. There is no  evidence of hemorrhage, mass, demyelination, acute infarct, or  anomaly. There are no gadolinium enhancing lesions. The facial  structures appear normal. The arteries at the base of the brain and  the dural venous sinuses appear patent.         IMPRESSION:   1. No acute pathology, no bleed, mass, or infarcts are  seen.  2. Age-related changes including generalized atrophy of the brain.  White matter changes in the cerebral hemispheres consistent with small  vessel ischemic disease in this age patient.       ASSESSMENT AND PLAN: 84-year-old white male with syncopal/seizure like episode likely exacerbated by anemia from myeloma treatment- As to the etiology of Mr. Hein's syncopal/seizure episode, this is still somewhat uncertain.  neurology has seen him and interpreted his recent EEG as not clearly indicative of a seizure disorder.  His MRI of the brain above is also fairly unremarkable.      Thus, given his increasing weakness and recent chemotherapy/disease related anemia, he may have simply had a syncopal episode brought on by dehydration anemia hypotension etc.    In addition a fairly large abdominal aneurysm has been found on CT scanning for which he will soon require Vascular Surgery/specialist evaluation (I often us Akhil Fregoso, Radha et al of [facility]).  Of note his current diagnosis of myeloma and treatment (Velcade) should not preclude any needed surgical or interventional procedures.    His Velcade for now will be held and/or rescheduled to potentially later this week.  Should his hemoglobin fall below 9 during this hospital stay and soon after (it appears he is being discharged today), it may also be prudent to consider giving him PRBC transfusion.  Of note, he has recently initiated erythropoietic therapy with darbepoetin (Aranesp) in the outpatient setting to maximize hematopoiesis and hopefully prevent PRBC transfusion.    -Please call with additional questions.  Hopefully, the duration of his hospitalization can be brief.      Dom Salcedo MD, MS  Hematologist-Oncologist  [facility]

## 2017-09-12 NOTE — PLAN OF CARE
Problem: Discharge Planning  Goal: Discharge Planning (Adult, OB, Behavioral, Peds)  Outcome: Improving  PRIMARY DIAGNOSIS: Seizure  OUTPATIENT/OBSERVATION GOALS TO BE MET BEFORE DISCHARGE:  1. Orthostatic performed: No      2. Diagnostic testing complete & at baseline neurologic testing: EEG, MRI      3. Cleared by consultants (if involved): No      4. Interpretation of cardiac rhythm per telemetry tech: SR w/ PAC's and PVC's HR 70-85      5. Tolerating adequate PO diet and medications: Yes      6. Return to near baseline physical activity or neurologic status: SBA with cane  Discharge Planner Nurse   Safe discharge environment identified: Yes  Barriers to discharge: not once medically cleared.        Entered by: Veronica Mahoney      Please review provider order for any additional goals.   Nurse to notify provider when observation goals have been met and patient is ready for discharge.      Pt A/O x4, [place]. Up with SBA and cane. EEG and MRI of brain complete. Will continue to monitor and provide supportive cares.

## 2017-09-12 NOTE — PROGRESS NOTES
Patient's After Visit Summary was reviewed with patient.  Patient verbalized understanding of After Visit Summary, recommended follow up and was given an opportunity to ask questions.   Discharge medications sent home with patient/family:Clair  Discharged with: Family     OBSERVATION patient END time: 1400             Revision History

## 2017-09-12 NOTE — PLAN OF CARE
Problem: Discharge Planning  Goal: Discharge Planning (Adult, OB, Behavioral, Peds)  Outcome: Improving  PRIMARY DIAGNOSIS: Seizure  OUTPATIENT/OBSERVATION GOALS TO BE MET BEFORE DISCHARGE:  1. Orthostatic performed: No      2. Diagnostic testing complete & at baseline neurologic testing: EEG, MRI      3. Cleared by consultants (if involved): No      4. Interpretation of cardiac rhythm per telemetry tech:       5. Tolerating adequate PO diet and medications: Yes      6. Return to near baseline physical activity or neurologic status: SBA with walker  Discharge Planner Nurse   Safe discharge environment identified: Yes  Barriers to discharge: not once medically cleared.        Entered by: Veronica Mahoney      Please review provider order for any additional goals.   Nurse to notify provider when observation goals have been met and patient is ready for discharge.      Vitals stable. Alert and oriented x4. [place]. Seizure precautions in place. Neuro's intact. No seizure activity since admission. Tele monitoring in place. IV fluids infusing. Pt resting comfortably. MRI complete. Started on oral Keppra. Continue to monitor overnight.

## 2017-09-12 NOTE — CONSULTS
CM was contacted for home care referral. Met with patient, spouse & family in room. They have no preference for home care. Referral sent to [facility] for PT & RN. Updated [facility] Liason Prema Lazar via phone. AVS updated.     CM will continue to follow patient until discharge for any additional needs.     Mahi Martines RN, BSN, [facility]  [phone]

## 2017-09-12 NOTE — DISCHARGE INSTRUCTIONS
A referral has been made to [facility] for Physical Therapy and Skilled Nursing services. They will contact you in the next 24 - 48 hours. If you have not heard from them in that time, please contact them at [phone].

## 2017-09-12 NOTE — PLAN OF CARE
Problem: Discharge Planning  Goal: Discharge Planning (Adult, OB, Behavioral, Peds)  Outcome: Improving  PRIMARY DIAGNOSIS: Seizure  OUTPATIENT/OBSERVATION GOALS TO BE MET BEFORE DISCHARGE:  1. Orthostatic performed: No      2. Diagnostic testing complete & at baseline neurologic testing: EEG, MRI      3. Cleared by consultants (if involved): No      4. Interpretation of cardiac rhythm per telemetry tech: SR w/ PAC's and PVC's HR 70-85      5. Tolerating adequate PO diet and medications: Yes      6. Return to near baseline physical activity or neurologic status: SBA with walker  Discharge Planner Nurse   Safe discharge environment identified: Yes  Barriers to discharge: not once medically cleared.        Entered by: Veronica Mahoney      Please review provider order for any additional goals.   Nurse to notify provider when observation goals have been met and patient is ready for discharge.      Pt A/O x4, [place]. Up with SBA and cane. EEG and MRI of brain complete. Will continue to monitor and provide supportive cares.

## 2017-09-12 NOTE — DISCHARGE SUMMARY
"[facility]  Discharge Summary  Name: Franklyn Hein    MRN: [id number]  YOB: 1932    Age: 84 year old  Date of Discharge:  9/12/2017  Date of Admission: 9/10/2017  Primary Care Provider: [facility]  Discharge Physician:  Porfirio Clifton MD  Discharging Service:  Hospitalist  Date of Service (when I saw the patient): 09/12/17    Discharge Diagnosis:  Possible seizure     Other Diagnosis:  Multiple myeloma, HTN, HLP, DM     Discharge Disposition:  Discharged to home     Allergies:  Not on File     Discharge Medications:   Current Discharge Medication List      START taking these medications    Details   levETIRAcetam (KEPPRA) 250 MG tablet Take 1 tablet (250 mg) by mouth 2 times daily  Qty: 60 tablet, Refills: 0    Associated Diagnoses: Seizure (H)         CONTINUE these medications which have NOT CHANGED    Details   metFORMIN (GLUCOPHAGE-XR) 500 MG 24 hr tablet Take 1,000 mg by mouth daily      PROCHLORPERAZINE MALEATE PO Take 5-10 mg by mouth every 6 hours as needed for nausea       LISINOPRIL PO Take 10 mg by mouth daily       LORAZEPAM PO Take 0.5-1 mg by mouth every 6 hours as needed for anxiety or nausea       ATORVASTATIN CALCIUM PO Take 10 mg by mouth daily       ACYCLOVIR PO Take 400 mg by mouth 2 times daily       AMLODIPINE BESYLATE PO Take 2.5 mg by mouth daily       DEXAMETHASONE PO Take 40 mg by mouth Dexamethasone 4mg tabs: take 10 tabs (40mg) daily on days 1-4 and days 9-12 every 21 days with chemo.              Condition on Discharge:  Discharge condition: Stable   Discharge vitals: Blood pressure 101/55, pulse 75, temperature 96.5  F (35.8  C), temperature source Oral, resp. rate 16, height 1.778 m (5' 10\"), weight 64 kg (141 lb), SpO2 98 %.   Code status on discharge: Full Code     History of Illness:  See detailed admission note for full details.    84 year old male with a history of bone marrow cancer \"multiple myeloma\", hypertension, " "hyperlipidemia, and type 2 diabetes  who presents with brief episode of unresponsiveness associated with urinary incontinence. According the patient's wife, the patient is currently undergoing treatment for his cancer with stomach injections every Tuesday and Friday. She states that today the patient had been laying down outside and came in because it started to rain, when he sat down he fell backwards in the chair and was unconscious for one minute. His daughter states that he developed a blank stare and that it looked as if \"he was staring through her\". During that time he had an episode of urinary incontinence. When he came to he was very confused and did not remember the incident. After the family conferred they estimated this is probably the third time this has happened. Upon patient's arrival to the ED, he only complains of feeling globally weak. He also notes that he has not eaten or drank very much today. Patient had two normal bowel movements yesterday. Patient denies headache, fall, chest pain, nausea, abdominal pain, or cough. Patient denies all other complaints.   I did discuss the case in detail with the ED physician.    Significant Physical Exam Findings:  Patient feels well. No chest pain, sob, abdo pain, n/v/d. Has been ambulating  s1s2 rrr, lungs clear, abdo +bs    Procedures:  eeg     Imaging:  Results for orders placed or performed during the hospital encounter of 09/10/17   CT Head w/o Contrast    Narrative    CT SCAN OF THE HEAD WITHOUT CONTRAST   9/10/2017 8:07 PM     HISTORY: Loss of consciousness. Fell backwards in a chair and was  unconscious for 1 minute.    TECHNIQUE:  Axial images of the head and coronal reformations without  IV contrast material. Radiation dose for this scan was reduced using  automated exposure control, adjustment of the mA and/or kV according  to patient size, or iterative reconstruction technique.    COMPARISON: None.    FINDINGS:  There is generalized atrophy of the " brain.  There is low  attenuation in the white matter of the cerebral hemispheres consistent  with sequelae of small vessel ischemic disease. There is no evidence  of intracranial hemorrhage, mass, acute infarct or anomaly.     The visualized portions of the sinuses and mastoids appear normal.  There is no evidence of trauma.      Impression    IMPRESSION:   1. No acute abnormality.  2. Atrophy of the brain.  White matter changes consistent with  sequelae of small vessel ischemic disease.      CHRIS CASTRO MD   XR Chest 2 Views    Narrative    CHEST TWO VIEWS  9/10/2017 8:18 PM     HISTORY: Loss of consciousness.    COMPARISON: None.      Impression    IMPRESSION: Hyperinflated lungs with radiolucency consistent with  bullous emphysema, worse on left than on the right. Vague infiltrates  laterally at both lung bases could be acute or chronic. Normal heart  size and pulmonary vascularity. No pleural effusion.    CHRIS CASTRO MD   Chest CT w/o contrast    Narrative    CT CHEST WITHOUT CONTRAST  9/10/2017 9:02 PM    HISTORY:  Question of pneumonia.    TECHNIQUE:  Scans obtained from the apices through the diaphragm  without IV contrast. Radiation dose for this scan was reduced using  automated exposure control, adjustment of the mA and/or kV according  to patient size, or iterative reconstruction technique.    COMPARISON:  Chest x-ray 9/10/2017.    FINDINGS:  No IV contrast. Thoracic aortic and coronary artery  calcifications noted. No acute mediastinal finding. No effusions  identified. Emphysematous changes diffusely. The bilateral lung bases  show some mild interstitial prominence and several pulmonary nodules.  One of the largest is at the lateral right lower lobe measuring 0.9 cm  series 5 image 54. There are several other nodules as well. Scarring  at the bilateral apices. Upper abdomen images show a large infrarenal  abdominal aortic aneurysm that is 7.2 x 6.8 cm on axial series 4 image  74. No perceptible  adjacent hemorrhage. Trace ascites. Renal cysts  bilaterally.      Impression    IMPRESSION:  1. Multiple bilateral pulmonary nodules at the lung bases. Some  interstitial prominence also noted. See below for follow up  recommendations. One of the largest nodules is 0.9 cm at the right  lower lobe.  2. No lobar pneumonia identified.  3. Diffuse emphysema.  4. Very large infrarenal abdominal aortic aneurysm that is 7.2 cm. No  adjacent hemorrhage visualized on the included images. Consider  vascular consultation for management.  5. Trace ascites.    Recommendations for an incidental lung nodule > 8mm:    Low risk patients: Consider follow-up CT in 3 months, PET/CT, and/or  tissue sampling.    High risk patients: Same as for low risk patients.    *Low Risk: Minimal or absent history of smoking or other known risk  factors.  *Nonsolid (ground-glass) or partly solid nodules may require longer  follow-up to exclude indolent adenocarcinoma.  *Recommendations based on Guidelines for the Management of Incidental  Pulmonary Nodules Detected at CT: From the Fleischner Society 2017,  Radiology 2017.       TESSA MONTERO MD   CT Chest/Abdomen/Pelvis w Contrast    Narrative    CT CHEST/ABDOMEN/PELVIS W CONTRAST   9/10/2017 11:27 PM      HISTORY:  Loss of consciousness. Bone marrow cancer.    TECHNIQUE:  CT chest, abdomen and pelvis with intravenous contrast.  Radiation dose for this scan was reduced using automated exposure  control, adjustment of the mA and/or kV according to patient size, or  iterative reconstruction technique.  83mL [id number]-[id number]      COMPARISON:  CT chest 9/10/2017.    FINDINGS:  Chest: The thoracic aorta is calcified and tortuous. No thoracic  aortic aneurysm or dissection. The heart size is normal. No  mediastinal, hilar or axillary lymph node enlargement. No large  central pulmonary embolus. There is advanced emphysematous disease.  There are few bilateral lung nodules. The largest is in the right  lower lobe  measuring 0.9 cm. No pneumothorax or pleural effusion.    Abdomen: Calcified granulomas in the spleen. The liver, gallbladder,  pancreas and adrenal glands are normal in appearance. There are a few  renal cysts bilaterally. The largest is at the posterior aspect of the  left kidney measuring 4.5 cm. There is a large abdominal aortic  aneurysm measuring up to 7 cm AP. The right common iliac artery is  aneurysmal at 4.3 cm. 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 There is no aortic dissection. No abdominal or pelvic lymph node  enlargement.    Pelvis: There are colonic diverticula without acute diverticulitis.  The colon is distended with gas and stool. A few small bowel loops are  mildly dilated without evidence of obstruction. There is a small  amount of ascites. No free intraperitoneal gas.      Impression    IMPRESSION:  1. 7 cm abdominal aortic aneurysm and bilateral common iliac artery  aneurysms. No aortic dissection.  2. Emphysema.  3. A few indeterminate lung nodules measuring up to 0.9 cm. Followup  recommended.   4. Dilated small bowel loops without evidence of obstruction. This may  be an ileus.  5. Small amount of ascites.    Recommendations for an incidental lung nodule > 8mm:    Low risk patients: Consider follow-up CT in 3 months, PET/CT, and/or  tissue sampling.    High risk patients: Same as for low risk patients.    *Low Risk: Minimal or absent history of smoking or other known risk  factors.  *Nonsolid (ground-glass) or partly solid nodules may require longer  follow-up to exclude indolent adenocarcinoma.  *Recommendations based on Guidelines for the Management of Incidental  Pulmonary Nodules Detected at CT: From the Fleischner Society 2017,  Radiology 2017.    GWENDOLYN LIEBERMAN MD   MR Brain w/o & w Contrast    Narrative    MR BRAIN W/O & W CONTRAST 9/11/2017 7:07 PM     HISTORY: possible seizure    TECHNIQUE: Multiplanar, multisequence MRI of the brain without and  with 6 mL Gadavist   IV contrast material.    COMPARISON: None.    FINDINGS:  There is generalized atrophy of the brain.  White matter  changes are present in the cerebral hemispheres that are consistent  with small vessel ischemic disease in this age patient. There is no  evidence of hemorrhage, mass, demyelination, acute infarct, or  anomaly. There are no gadolinium enhancing lesions. The facial  structures appear normal. The arteries at the base of the brain and  the dural venous sinuses appear patent.      Impression    IMPRESSION:   1. No acute pathology, no bleed, mass, or infarcts are seen.  2. Age-related changes including generalized atrophy of the brain.  White matter changes in the cerebral hemispheres consistent with small  vessel ischemic disease in this age patient.    CRISTINA EDWARDS MD        Consultations:  neurology     Significant Lab Results:    Recent Labs  Lab 09/12/17  0538 09/10/17  1932   WBC 6.8 7.5   HGB 9.2* 10.9*   HCT 26.6* 31.6*   MCV 96 96   PLT 68* 118*          Lab Results   Component Value Date     09/12/2017     09/11/2017     09/10/2017    Lab Results   Component Value Date    CHLORIDE 105 09/12/2017    CHLORIDE 104 09/11/2017    CHLORIDE 95 09/10/2017    Lab Results   Component Value Date    BUN 17 09/12/2017    BUN 31 09/11/2017    BUN 42 09/10/2017      Lab Results   Component Value Date    POTASSIUM 3.9 09/12/2017    POTASSIUM 4.3 09/11/2017    POTASSIUM 4.4 09/10/2017    Lab Results   Component Value Date    CO2 23 09/12/2017    CO2 23 09/11/2017    CO2 23 09/10/2017    Lab Results   Component Value Date    CR 0.93 09/12/2017    CR 1.12 09/11/2017    CR 1.31 09/10/2017             Pending Results:  Unresulted Labs Ordered in the Past 30 Days of this Admission     No orders found from 7/12/2017 to 9/11/2017.           Discharge Instructions and Follow-Up:   Discharge diet:   Active Diet Order      Combination Diet 7456-2313 Calories: Moderate Consistent CHO (4-6 CHO units/meal);  Mechanical/Dental Soft Diet      Diet   Discharge activity: Activity as tolerated   Discharge follow-up: Follow up with primary care provider in 7 days   Outpatient therapy: None    Home Care agency: None    Other instructions: Vascular surgery consult      Hospital Course:  Patient was admitted to the Obs Unit. He was seen by Neurology who recommended EEG. Dr. Salcedo followed the patient while here. He was started on low-dose Keppra. Patient has done well here. He was seen by PT and cleared to go home. His EEG showed:  IMPRESSION:  This is a minimally abnormal EEG due to the presence of mild generalized slowing as well as independent infrequent left and right temporal slowing.  These findings are overall a relatively nonspecific and could be seen in a mild encephalopathy.  There is nothing to suggest an underlying seizure disorder, but this cannot be precluded based on the EEG alone and further clinical correlation is recommended.   I personally spoke with Dr. Hernandez who recommended he stay on the low dose Keppra and follow-up as an outpt.   Patient had a large AAA seen on his CT scan (7cm). I explained this to him and his family. I discussed he needs to see a vascular surgeon ASAP. I placed a referral for vascular surgery and I spoke to his MD's (Dr. Hoskins, [facility]) nurse to inform them of his AAA and need for vascular surgery referral.         Total time spent in face to face contact with the patient and coordinating discharge was:  40 Minutes.    Porfirio Clifton MD  Pager: [phone]

## 2017-09-13 ENCOUNTER — TELEPHONE (OUTPATIENT)
Dept: OTHER | Facility: CLINIC | Age: 82
End: 2017-09-13

## 2017-09-13 NOTE — TELEPHONE ENCOUNTER
"Pts wife called noting pt being Referred by Dr. Salcedo and [facility] Dr. Rip Hoskins for AAA.    Pt recent hospitalization for possible seziure.   9-12-17 Dr. Clifton notes \"84 year old male with a history of bone marrow cancer \"multiple myeloma\", hypertension, hyperlipidemia, and type 2 diabetes  who presents with brief episode of unresponsiveness associated with urinary incontinence. According the patient's wife, the patient is currently undergoing treatment for his cancer with stomach injections every Tuesday and Friday. She states that today the patient had been laying down outside and came in because it started to rain, when he sat down he fell backwards in the chair and was unconscious for one minute.\"    CT CHEST/ABDOMEN/PELVIS W CONTRAST   9/10/2017   Dr. Liao notes:    \"HISTORY:  Loss of consciousness. Bone marrow cancer.\"  \"COMPARISON:  CT chest 9/10/2017.  \"IMPRESSION:  1. 7 cm abdominal aortic aneurysm and bilateral common iliac artery  aneurysms. No aortic dissection.  2. Emphysema.  3. A few indeterminate lung nodules measuring up to 0.9 cm. Followup  recommended.   4. Dilated small bowel loops without evidence of obstruction. This may  be an ileus.  5. Small amount of ascites.\"    Pt needs consult with vascular surgery.     Socorro Reddy, RN, BSN.   "

## 2017-09-14 ENCOUNTER — OFFICE VISIT (OUTPATIENT)
Dept: SURGERY | Facility: CLINIC | Age: 82
End: 2017-09-14
Payer: COMMERCIAL

## 2017-09-14 VITALS
OXYGEN SATURATION: 95 % | HEIGHT: 70 IN | WEIGHT: 141 LBS | HEART RATE: 75 BPM | SYSTOLIC BLOOD PRESSURE: 130 MMHG | BODY MASS INDEX: 20.19 KG/M2 | DIASTOLIC BLOOD PRESSURE: 68 MMHG

## 2017-09-14 DIAGNOSIS — I71.40 ABDOMINAL AORTIC ANEURYSM GREATER THAN 39 MM IN DIAMETER (H): Primary | ICD-10-CM

## 2017-09-14 PROCEDURE — 99204 OFFICE O/P NEW MOD 45 MIN: CPT | Performed by: SURGERY

## 2017-09-14 ASSESSMENT — ENCOUNTER SYMPTOMS
DIARRHEA: 1
CHANGE IN BOWEL HABIT: 1
CONSTIPATION: 1
WEIGHT LOSS: 1
TROUBLE SWALLOWING: 1
APPETITE CHANGE: 1
CLAUDICATION: 1
ABDOMINAL PAIN: 1
NAUSEA: 1

## 2017-09-14 NOTE — MR AVS SNAPSHOT
After Visit Summary   9/14/2017    Franklyn Hein    MRN: [id number]           Patient Information     Date Of Birth          10/28/1932        Visit Information        Provider Department      9/14/2017 3:00 PM Juanito Landaverde MD [facility]      Today's Diagnoses     Abdominal aortic aneurysm greater than 39 mm in diameter (H)    -  1       Follow-ups after your visit        Follow-up notes from your care team     Return in about 2 weeks (around 9/28/2017) for EVAR with embolization of left hypogastric artery.      Your next 10 appointments already scheduled     Sep 29, 2017   Procedure with Juanito Landaverde MD   [facility] PeriOP Services (--)    [address] [id number] [phone]            Sep 29, 2017  7:30 AM CDT   [facility] Main OR with Juanito Landaverde MD   [facility]    [facility])   [phone]            Sep 29, 2017  7:30 AM CDT   IR ABDOMINAL ENDOVASCULAR STENT GRAFT with [id number]   [facility] Interventional Radiology ([facility])    [address] [facility] [id number] [phone]           1. You will need to have had a history and physical exam within 7 days of the procedure. 2. Laboratory test are to be obtained by your doctor prior to the exam (CBCP, INR and PTT) 3. Someone will need to drive you to and from the hospital. 4. If you are or may be pregnant, contact your doctor or a Radiology nurse prior to the day of the exam. 5. If you have diabetes, check with your doctor or a Radiology nurse to see if your insulin needs to be adjusted for the exam. 6. If you are taking Coumadin (to thin you blood) please contact your doctor or a Radiology nurse at least 3 days before the exam for special instructions. 7. The day before  "your exam you may eat your regular diet and are encouraged to drink at least 2 quarts of clear liquids. Drink no alcoholic beverages for 24 hours prior to the exam. 8. Do not eat any solid food or milk products for 6 hours prior to the exam. You may drink clear liquids until 2 hours prior to the exam. Clear liquids include the following: water, Jell-O, clear broth, apple juice or any noncarbonated drink that you can see through (no pop!) 9. The morning of the exam you may brush your teeth and take medications as directed with a sip of water. 10. Tell the Radiology nurse if you have any allergies.              Future tests that were ordered for you today     Open Future Orders        Priority Expected Expires Ordered    IR Abdominal Endovascular Stent Graft Routine  9/20/2018 9/20/2017            Who to contact     If you have questions or need follow up information about today's clinic visit or your schedule please contact [facility] directly at [phone].  Normal or non-critical lab and imaging results will be communicated to you by Digital Oceanhart, letter or phone within 4 business days after the clinic has received the results. If you do not hear from us within 7 days, please contact the clinic through BitWinet or phone. If you have a critical or abnormal lab result, we will notify you by phone as soon as possible.  Submit refill requests through EpicForce or call your pharmacy and they will forward the refill request to us. Please allow 3 business days for your refill to be completed.          Additional Information About Your Visit        EpicForce Information     EpicForce lets you send messages to your doctor, view your test results, renew your prescriptions, schedule appointments and more. To sign up, go to [email or web address] . Click on \"Log in\" on the left side of the screen, which will take you to the Welcome page. Then click on \"Sign up Now\" on the right side of the page.     You will be " "asked to enter the access code listed below, as well as some personal information. Please follow the directions to create your username and password.     Your access code is: [email or web address]  Expires: 2017 12:36 PM     Your access code will  in 90 days. If you need help or a new code, please call your [facility] clinic or [phone].        Care EveryWhere ID     This is your Care EveryWhere ID. This could be used by other organizations to access your [facility] medical records  [id number]        Your Vitals Were     Pulse Height Pulse Oximetry BMI (Body Mass Index)          75 5' 10\" (1.778 m) 95% 20.23 kg/m2         Blood Pressure from Last 3 Encounters:   17 130/68   17 101/55    Weight from Last 3 Encounters:   17 141 lb (64 kg)   09/10/17 141 lb (64 kg)              Today, you had the following     No orders found for display       Primary Care Provider Office Phone # Fax #    Melvin Hoskins [phone]       [facility] [address] [facility] [address]        Equal Access to Services     LO FRYE AH: Hadii aad ku hadasho Solaronali, waaxda luqadaha, qaybta kaalmada adeegyada, klever rendon haymaurilion mendel dnoovan ah. So [facility] [phone].    ATENCIÓN: Si habla español, tiene a bartlett disposición servicios gratuitos de asistencia lingüística. Radha al [phone].    We comply with applicable federal civil rights laws and [place] laws. We do not discriminate on the basis of race, color, national origin, age, disability sex, sexual orientation or gender identity.            Thank you!     Thank you for choosing [facility]  for your care. Our goal is always to provide you with excellent care. Hearing back from our patients is one way we can continue to improve our services. Please take a few minutes to complete the written survey that you may receive in the mail after your visit with us. Thank you!             Your Updated " Medication List - Protect others around you: Learn how to safely use, store and throw away your medicines at www.disposemymeds.org.          This list is accurate as of: 9/14/17 11:59 PM.  Always use your most recent med list.                   Brand Name Dispense Instructions for use Diagnosis    ACYCLOVIR PO      Take 400 mg by mouth 2 times daily        AMLODIPINE BESYLATE PO      Take 2.5 mg by mouth daily        ATORVASTATIN CALCIUM PO      Take 10 mg by mouth daily        DEXAMETHASONE PO      Take 40 mg by mouth Dexamethasone 4mg tabs: take 10 tabs (40mg) daily on days 1-4 and days 9-12 every 21 days with chemo.        levETIRAcetam 250 MG tablet    KEPPRA    60 tablet    Take 1 tablet (250 mg) by mouth 2 times daily    Seizure (H)       LISINOPRIL PO      Take 10 mg by mouth daily        LORAZEPAM PO      Take 0.5-1 mg by mouth every 6 hours as needed for anxiety or nausea        metFORMIN 500 MG 24 hr tablet    GLUCOPHAGE-XR     Take 1,000 mg by mouth daily        PROCHLORPERAZINE MALEATE PO      Take 5-10 mg by mouth every 6 hours as needed for nausea

## 2017-09-14 NOTE — LETTER
[facility]  [address]  [address]  [place] [address]  Phone: [phone]  Fax: [phone]        2017    RE:  Franklyn Hein-:  [birth year]    HPI:  Franklyn Hein is a frail 84-year-old gentleman recently diagnosed with multiple myeloma.  He is undergoing medical therapy for this condition.  He was hospitalized at [facility] on 9/10/17 for a possible seizure.  Subsequent imaging included a CT scan of the chest abdomen and pelvis.  That demonstrated the unexpected finding of a 7.0 cm infrarenal AAA with bilateral common iliac involvement.  This aneurysm is asymptomatic.  He is referred today for vascular surgical consultation.  There is no family history of aneurysmal disease.  The patient smoked 2-3 packs of cigarettes per day for some 50 years.  He quit smoking several years ago.      Review of Systems   Constitutional: Positive for malaise/fatigue.   HENT: Negative.    Eyes: Negative.    Respiratory: Positive for shortness of breath.    Cardiovascular: Negative.    Gastrointestinal: Negative.    Genitourinary: Negative.    Musculoskeletal: Negative.    Skin: Negative.    Neurological: Positive for seizures and weakness.   Endo/Heme/Allergies: Bruises/bleeds easily.   Psychiatric/Behavioral: Negative.        Physical Exam   Constitutional:   Frail appearing elderly male in no acute distress   Eyes: EOM are normal. Pupils are equal, round, and reactive to light. No scleral icterus.   HENT:   Head: Normocephalic and atraumatic.   Neck: No JVD present.   Cardiovascular: Normal rate, regular rhythm and normal heart sounds.    Pulses:       Femoral pulses are 2+ on the right side, and 2+ on the left side.       Popliteal pulses are 2+ on the right side, and 2+ on the left side.        Posterior tibial pulses are 1+ on the right side, and 1+ on the left side.  Effort normal and breath sounds normal.   Abdominal: Soft. He exhibits no mass. There is no  tenderness.   Musculoskeletal: He exhibits edema.   Lymphadenopathy:     He has no cervical adenopathy.   Neurological: He is alert and oriented to person, place, and time.   Skin: Skin is warm and dry.   Psychiatric/Behavioral: mood and affect normal, normal behavior, normal thought content and normal judgment         Imaging:  CT CHEST/ABDOMEN/PELVIS W CONTRAST   9/10/2017 11:27 PM        HISTORY:  Loss of consciousness. Bone marrow cancer.      TECHNIQUE:  CT chest, abdomen and pelvis with intravenous contrast.  Radiation dose for this scan was reduced using automated exposure  control, adjustment of the mA and/or kV according to patient size, or  iterative reconstruction technique.  83mL Isovue-370        COMPARISON:  CT chest 9/10/2017.      FINDINGS:  Chest: The thoracic aorta is calcified and tortuous. No thoracic  aortic aneurysm or dissection. The heart size is normal. No  mediastinal, hilar or axillary lymph node enlargement. No large  central pulmonary embolus. There is advanced emphysematous disease.  There are few bilateral lung nodules. The largest is in the right  lower lobe measuring 0.9 cm. No pneumothorax or pleural effusion.      Abdomen: Calcified granulomas in the spleen. The liver, gallbladder,  pancreas and adrenal glands are normal in appearance. There are a few  renal cysts bilaterally. The largest is at the posterior aspect of the  left kidney measuring 4.5 cm. There is a large abdominal aortic  aneurysm measuring up to 7 cm AP. The right common iliac artery is  aneurysmal at 4.3 cm. 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 There is no aortic dissection. No abdominal or pelvic lymph node  enlargement.      Pelvis: There are colonic diverticula without acute diverticulitis.  The colon is distended with gas and stool. A few small bowel loops are  mildly dilated without evidence of obstruction. There is a small  amount of ascites. No free intraperitoneal gas.          IMPRESSION:  1.  7 cm abdominal aortic aneurysm and bilateral common iliac artery  aneurysms. No aortic dissection.  2. Emphysema.  3. A few indeterminate lung nodules measuring up to 0.9 cm. Followup  recommended.   4. Dilated small bowel loops without evidence of obstruction. This may  be an ileus.  5. Small amount of ascites.          ASSESSMENT:  Frail 84-year-old gentleman with multiple myeloma, COPD, recent seizure activity, and a newly diagnosed 7 cm infrarenal abdominal aortic aneurysm with bilateral common iliac artery involvement.  Thankfully, the AAA is amenable to endovascular repair.     PLAN:  I had a lengthy discussion with Franklyn and his wife reviewing all the above.  He would be extremely high risk for open surgery.  There are some technical challenges to endovascular repair given his anatomy.  We will need to embolize  his left hypogastric artery and extend the left limb of his graft into the left external iliac artery.  We also have a 13 mm infrarenal aortic neck length that will require precise placement of the graft relative to the renal arteries.  We may need to utilize [employer] endoanchors for additional proximal fixation.  I thoroughly explained the above including potential complications and the anticipated postoperative course.  He verbalizes full understanding and wishes to proceed.  EVAR with left hypogastric embolization will be scheduled at [facility] in a timely manner.        Leonardo Landaverde M.D.

## 2017-09-14 NOTE — PROGRESS NOTES
HPI      ROS (Review of Systems):      Positive for weight loss, appetite change and anemia.   HENT: Positive for difficult swallowing.    Respiratory: Positive for dyspnea.    Cardiovascular: Positive for leg pain, hypertension and hyperlipidemia.   GASTROINTESTINAL: Positive for nausea, abdominal pain, diarrhea, constipation and change in bowel habit.          Physical Exam

## 2017-09-15 ENCOUNTER — TRANSFERRED RECORDS (OUTPATIENT)
Dept: HEALTH INFORMATION MANAGEMENT | Facility: CLINIC | Age: 82
End: 2017-09-15

## 2017-09-20 ASSESSMENT — ENCOUNTER SYMPTOMS
BRUISES/BLEEDS EASILY: 1
PSYCHIATRIC NEGATIVE: 1
GASTROINTESTINAL NEGATIVE: 1
WEAKNESS: 1
CARDIOVASCULAR NEGATIVE: 1
EYES NEGATIVE: 1
MUSCULOSKELETAL NEGATIVE: 1
SEIZURES: 1
SHORTNESS OF BREATH: 1

## 2017-09-20 NOTE — PROGRESS NOTES
HPI:  Franklyn Hein is a frail 84-year-old gentleman recently diagnosed with multiple myeloma.  He is undergoing medical therapy for this condition.  He was hospitalized at [facility] on 9/10/17 for a possible seizure.  Subsequent imaging included a CT scan of the chest abdomen and pelvis.  That demonstrated the unexpected finding of a 7.0 cm infrarenal AAA with bilateral common iliac involvement.  This aneurysm is asymptomatic.  He is referred today for vascular surgical consultation.  There is no family history of aneurysmal disease.  The patient smoked 2-3 packs of cigarettes per day for some 50 years.  He quit smoking several years ago.        Review of Systems   Constitutional: Positive for malaise/fatigue.   HENT: Negative.    Eyes: Negative.    Respiratory: Positive for shortness of breath.    Cardiovascular: Negative.    Gastrointestinal: Negative.    Genitourinary: Negative.    Musculoskeletal: Negative.    Skin: Negative.    Neurological: Positive for seizures and weakness.   Endo/Heme/Allergies: Bruises/bleeds easily.   Psychiatric/Behavioral: Negative.          Physical Exam   Constitutional:   Frail appearing elderly male in no acute distress   Eyes: EOM are normal. Pupils are equal, round, and reactive to light. No scleral icterus.   HENT:   Head: Normocephalic and atraumatic.   Neck: No JVD present.   Cardiovascular: Normal rate, regular rhythm and normal heart sounds.    Pulses:       Femoral pulses are 2+ on the right side, and 2+ on the left side.       Popliteal pulses are 2+ on the right side, and 2+ on the left side.        Posterior tibial pulses are 1+ on the right side, and 1+ on the left side.  Effort normal and breath sounds normal.   Abdominal: Soft. He exhibits no mass. There is no tenderness.   Musculoskeletal: He exhibits edema.   Lymphadenopathy:     He has no cervical adenopathy.   Neurological: He is alert and oriented to person, place, and time.   Skin: Skin is warm and  dry.   Psychiatric/Behavioral: mood and affect normal, normal behavior, normal thought content and normal judgment       Imaging:  CT CHEST/ABDOMEN/PELVIS W CONTRAST   9/10/2017 11:27 PM       HISTORY:  Loss of consciousness. Bone marrow cancer.     TECHNIQUE:  CT chest, abdomen and pelvis with intravenous contrast.  Radiation dose for this scan was reduced using automated exposure  control, adjustment of the mA and/or kV according to patient size, or  iterative reconstruction technique.  83mL Isovue-370       COMPARISON:  CT chest 9/10/2017.     FINDINGS:  Chest: The thoracic aorta is calcified and tortuous. No thoracic  aortic aneurysm or dissection. The heart size is normal. No  mediastinal, hilar or axillary lymph node enlargement. No large  central pulmonary embolus. There is advanced emphysematous disease.  There are few bilateral lung nodules. The largest is in the right  lower lobe measuring 0.9 cm. No pneumothorax or pleural effusion.     Abdomen: Calcified granulomas in the spleen. The liver, gallbladder,  pancreas and adrenal glands are normal in appearance. There are a few  renal cysts bilaterally. The largest is at the posterior aspect of the  left kidney measuring 4.5 cm. There is a large abdominal aortic  aneurysm measuring up to 7 cm AP. The right common iliac artery is  aneurysmal at 4.3 cm. 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 There is no aortic dissection. No abdominal or pelvic lymph node  enlargement.     Pelvis: There are colonic diverticula without acute diverticulitis.  The colon is distended with gas and stool. A few small bowel loops are  mildly dilated without evidence of obstruction. There is a small  amount of ascites. No free intraperitoneal gas.         IMPRESSION:  1. 7 cm abdominal aortic aneurysm and bilateral common iliac artery  aneurysms. No aortic dissection.  2. Emphysema.  3. A few indeterminate lung nodules measuring up to 0.9 cm. Followup  recommended.   4.  Dilated small bowel loops without evidence of obstruction. This may  be an ileus.  5. Small amount of ascites.           ASSESSMENT:  Frail 84-year-old gentleman with multiple myeloma, COPD, recent seizure activity, and a newly diagnosed 7 cm infrarenal abdominal aortic aneurysm with bilateral common iliac artery involvement.  Thankfully, the AAA is amenable to endovascular repair.    PLAN:  I had a lengthy discussion with Franklyn and his wife reviewing all the above.  He would be extremely high risk for open surgery.  There are some technical challenges to endovascular repair given his anatomy.  We will need to embolize  his left hypogastric artery and extend the left limb of his graft into the left external iliac artery.  We also have a 13 mm infrarenal aortic neck length that will require precise placement of the graft relative to the renal arteries.  We may need to utilize [employer] endoanchors for additional proximal fixation.  I thoroughly explained the above including potential complications and the anticipated postoperative course.  He verbalizes full understanding and wishes to proceed.  EVAR with left hypogastric embolization will be scheduled at [facility] in a timely manner.    Total face-to-face time was 45 minutes, greater than 50% spent providing counseling and education.    Leonardo Landaverde M.D.

## 2017-09-29 ENCOUNTER — APPOINTMENT (OUTPATIENT)
Dept: SURGERY | Facility: PHYSICIAN GROUP | Age: 82
End: 2017-09-29
Payer: COMMERCIAL

## 2017-09-29 ENCOUNTER — HOSPITAL ENCOUNTER (INPATIENT)
Facility: CLINIC | Age: 82
LOS: 2 days | Discharge: HOME | DRG: 271 | End: 2017-10-01
Attending: SURGERY | Admitting: SURGERY
Payer: MEDICARE

## 2017-09-29 ENCOUNTER — ANESTHESIA EVENT (OUTPATIENT)
Dept: SURGERY | Facility: CLINIC | Age: 82
DRG: 271 | End: 2017-09-29
Payer: MEDICARE

## 2017-09-29 ENCOUNTER — ANESTHESIA (OUTPATIENT)
Dept: SURGERY | Facility: CLINIC | Age: 82
DRG: 271 | End: 2017-09-29
Payer: MEDICARE

## 2017-09-29 ENCOUNTER — APPOINTMENT (OUTPATIENT)
Dept: INTERVENTIONAL RADIOLOGY/VASCULAR | Facility: CLINIC | Age: 82
DRG: 271 | End: 2017-09-29
Attending: SURGERY
Payer: MEDICARE

## 2017-09-29 ENCOUNTER — APPOINTMENT (OUTPATIENT)
Dept: GENERAL RADIOLOGY | Facility: CLINIC | Age: 82
DRG: 271 | End: 2017-09-29
Attending: SURGERY
Payer: MEDICARE

## 2017-09-29 DIAGNOSIS — I71.40 AAA (ABDOMINAL AORTIC ANEURYSM) (H): ICD-10-CM

## 2017-09-29 DIAGNOSIS — I71.30 RUPTURED ABDOMINAL AORTIC ANEURYSM (AAA) (H): ICD-10-CM

## 2017-09-29 DIAGNOSIS — I71.40 ABDOMINAL AORTIC ANEURYSM (AAA) WITHOUT RUPTURE (H): ICD-10-CM

## 2017-09-29 DIAGNOSIS — R56.9 SEIZURE (H): Primary | ICD-10-CM

## 2017-09-29 LAB
ABO + RH BLD: NORMAL
ABO + RH BLD: NORMAL
BLD GP AB SCN SERPL QL: NORMAL
BLD PROD TYP BPU: NORMAL
BLOOD BANK CMNT PATIENT-IMP: NORMAL
CHOLEST SERPL-MCNC: 172 MG/DL
CO2 BLD-SCNC: 23 MMOL/L (ref 21–28)
CREAT SERPL-MCNC: 1.06 MG/DL (ref 0.66–1.25)
ERYTHROCYTE [DISTWIDTH] IN BLOOD BY AUTOMATED COUNT: 15.8 % (ref 10–15)
GFR SERPL CREATININE-BSD FRML MDRD: 66 ML/MIN/1.7M2
GLUCOSE BLDC GLUCOMTR-MCNC: 135 MG/DL (ref 70–99)
GLUCOSE BLDC GLUCOMTR-MCNC: 154 MG/DL (ref 70–99)
GLUCOSE BLDC GLUCOMTR-MCNC: 164 MG/DL (ref 70–99)
GLUCOSE BLDC GLUCOMTR-MCNC: 177 MG/DL (ref 70–99)
GLUCOSE SERPL-MCNC: 120 MG/DL (ref 70–99)
HBA1C MFR BLD: 7.2 % (ref 4.3–6)
HCT VFR BLD AUTO: 35.7 % (ref 40–53)
HCT VFR BLD CALC: 26 %PCV (ref 40–53)
HDLC SERPL-MCNC: 43 MG/DL
HGB BLD CALC-MCNC: 8.8 G/DL (ref 13.3–17.7)
HGB BLD-MCNC: 11.9 G/DL (ref 13.3–17.7)
KCT BLD-ACNC: 224 SEC (ref 105–167)
KCT BLD-ACNC: 236 SEC (ref 105–167)
KCT BLD-ACNC: 242 SEC (ref 105–167)
KCT BLD-ACNC: 260 SEC (ref 105–167)
KCT BLD-ACNC: 266 SEC (ref 105–167)
LDLC SERPL CALC-MCNC: 104 MG/DL
MCH RBC QN AUTO: 30.9 PG (ref 26.5–33)
MCHC RBC AUTO-ENTMCNC: 33.3 G/DL (ref 31.5–36.5)
MCV RBC AUTO: 93 FL (ref 78–100)
NONHDLC SERPL-MCNC: 129 MG/DL
NUM BPU REQUESTED: 2
PCO2 BLD: 40 MM HG (ref 35–45)
PH BLD: 7.36 PH (ref 7.35–7.45)
PLATELET # BLD AUTO: 158 10E9/L (ref 150–450)
PO2 BLD: 262 MM HG (ref 80–105)
POTASSIUM BLD-SCNC: 3.9 MMOL/L (ref 3.4–5.3)
POTASSIUM SERPL-SCNC: 4 MMOL/L (ref 3.4–5.3)
RBC # BLD AUTO: 3.85 10E12/L (ref 4.4–5.9)
SAO2 % BLDA FROM PO2: 100 % (ref 92–100)
SODIUM BLD-SCNC: 133 MMOL/L (ref 133–144)
SPECIMEN EXP DATE BLD: NORMAL
TRIGL SERPL-MCNC: 126 MG/DL
WBC # BLD AUTO: 4.8 10E9/L (ref 4–11)

## 2017-09-29 PROCEDURE — A9270 NON-COVERED ITEM OR SERVICE: HCPCS | Mod: GY | Performed by: PHYSICIAN ASSISTANT

## 2017-09-29 PROCEDURE — 71020 XR CHEST 2 VW: CPT

## 2017-09-29 PROCEDURE — C1769 GUIDE WIRE: HCPCS | Performed by: SURGERY

## 2017-09-29 PROCEDURE — C1725 CATH, TRANSLUMIN NON-LASER: HCPCS

## 2017-09-29 PROCEDURE — 25000128 H RX IP 250 OP 636: Performed by: SURGERY

## 2017-09-29 PROCEDURE — 82803 BLOOD GASES ANY COMBINATION: CPT

## 2017-09-29 PROCEDURE — 84132 ASSAY OF SERUM POTASSIUM: CPT | Performed by: SURGERY

## 2017-09-29 PROCEDURE — 84132 ASSAY OF SERUM POTASSIUM: CPT

## 2017-09-29 PROCEDURE — 25000132 ZZH RX MED GY IP 250 OP 250 PS 637: Mod: GY | Performed by: SURGERY

## 2017-09-29 PROCEDURE — 25000125 ZZHC RX 250: Performed by: SURGERY

## 2017-09-29 PROCEDURE — 86923 COMPATIBILITY TEST ELECTRIC: CPT | Performed by: SURGERY

## 2017-09-29 PROCEDURE — C1768 GRAFT, VASCULAR: HCPCS | Performed by: SURGERY

## 2017-09-29 PROCEDURE — 82565 ASSAY OF CREATININE: CPT | Performed by: SURGERY

## 2017-09-29 PROCEDURE — 86901 BLOOD TYPING SEROLOGIC RH(D): CPT | Performed by: SURGERY

## 2017-09-29 PROCEDURE — 82947 ASSAY GLUCOSE BLOOD QUANT: CPT | Performed by: SURGERY

## 2017-09-29 PROCEDURE — 04VF3DZ RESTRICTION OF LEFT INTERNAL ILIAC ARTERY WITH INTRALUMINAL DEVICE, PERCUTANEOUS APPROACH: ICD-10-PCS | Performed by: RADIOLOGY

## 2017-09-29 PROCEDURE — C1894 INTRO/SHEATH, NON-LASER: HCPCS | Performed by: SURGERY

## 2017-09-29 PROCEDURE — 71000013 ZZH RECOVERY PHASE 1 LEVEL 1 EA ADDTL HR: Performed by: SURGERY

## 2017-09-29 PROCEDURE — 12000007 ZZH R&B INTERMEDIATE

## 2017-09-29 PROCEDURE — 80061 LIPID PANEL: CPT | Performed by: SURGERY

## 2017-09-29 PROCEDURE — 25000132 ZZH RX MED GY IP 250 OP 250 PS 637: Mod: GY | Performed by: PHYSICIAN ASSISTANT

## 2017-09-29 PROCEDURE — 27211143 ZZHC CATH NEURO CR14

## 2017-09-29 PROCEDURE — 27210995 ZZH RX 272: Performed by: SURGERY

## 2017-09-29 PROCEDURE — 40000170 ZZH STATISTIC PRE-PROCEDURE ASSESSMENT II: Performed by: SURGERY

## 2017-09-29 PROCEDURE — 27210808 ZZH SHEATH CR7

## 2017-09-29 PROCEDURE — 36415 COLL VENOUS BLD VENIPUNCTURE: CPT | Performed by: SURGERY

## 2017-09-29 PROCEDURE — C1773 RET DEV, INSERTABLE: HCPCS

## 2017-09-29 PROCEDURE — 86900 BLOOD TYPING SEROLOGIC ABO: CPT | Performed by: SURGERY

## 2017-09-29 PROCEDURE — C1725 CATH, TRANSLUMIN NON-LASER: HCPCS | Performed by: SURGERY

## 2017-09-29 PROCEDURE — 37000009 ZZH ANESTHESIA TECHNICAL FEE, EACH ADDTL 15 MIN: Performed by: SURGERY

## 2017-09-29 PROCEDURE — 27210794 ZZH OR GENERAL SUPPLY STERILE: Performed by: SURGERY

## 2017-09-29 PROCEDURE — 34825 ZZHC AAA REPR,1ST VESSEL,EXTENSION PROSTH: CPT | Mod: 62 | Performed by: SURGERY

## 2017-09-29 PROCEDURE — 71000012 ZZH RECOVERY PHASE 1 LEVEL 1 FIRST HR: Performed by: SURGERY

## 2017-09-29 PROCEDURE — 83036 HEMOGLOBIN GLYCOSYLATED A1C: CPT | Performed by: SURGERY

## 2017-09-29 PROCEDURE — 36000069 ZZH SURGERY LEVEL 5 EA 15 ADDTL MIN: Performed by: SURGERY

## 2017-09-29 PROCEDURE — 34812 OPN FEM ART EXPOS: CPT | Mod: 50 | Performed by: SURGERY

## 2017-09-29 PROCEDURE — 85014 HEMATOCRIT: CPT

## 2017-09-29 PROCEDURE — 86850 RBC ANTIBODY SCREEN: CPT | Performed by: SURGERY

## 2017-09-29 PROCEDURE — 25000131 ZZH RX MED GY IP 250 OP 636 PS 637: Mod: GY | Performed by: PHYSICIAN ASSISTANT

## 2017-09-29 PROCEDURE — 85347 COAGULATION TIME ACTIVATED: CPT

## 2017-09-29 PROCEDURE — 85049 AUTOMATED PLATELET COUNT: CPT | Performed by: SURGERY

## 2017-09-29 PROCEDURE — 04WY3DZ REVISION OF INTRALUMINAL DEVICE IN LOWER ARTERY, PERCUTANEOUS APPROACH: ICD-10-PCS | Performed by: RADIOLOGY

## 2017-09-29 PROCEDURE — 04V00FZ RESTRICTION OF ABDOMINAL AORTA WITH BRANCHED OR FENESTRATED INTRALUMINAL DEVICE, THREE OR MORE ARTERIES, OPEN APPROACH: ICD-10-PCS | Performed by: RADIOLOGY

## 2017-09-29 PROCEDURE — 25000128 H RX IP 250 OP 636: Performed by: NURSE ANESTHETIST, CERTIFIED REGISTERED

## 2017-09-29 PROCEDURE — 34802 ZZC AAA REPAIR,MODULR BIFURCATED PROSTH: CPT | Mod: 62 | Performed by: SURGERY

## 2017-09-29 PROCEDURE — A9270 NON-COVERED ITEM OR SERVICE: HCPCS | Mod: GY | Performed by: SURGERY

## 2017-09-29 PROCEDURE — 25000125 ZZHC RX 250: Performed by: NURSE ANESTHETIST, CERTIFIED REGISTERED

## 2017-09-29 PROCEDURE — 25000128 H RX IP 250 OP 636: Performed by: ANESTHESIOLOGY

## 2017-09-29 PROCEDURE — 27810343 IR ABDOMINAL ENDOVASCULAR STENT GRAFT

## 2017-09-29 PROCEDURE — 84295 ASSAY OF SERUM SODIUM: CPT

## 2017-09-29 PROCEDURE — 00000146 ZZHCL STATISTIC GLUCOSE BY METER IP

## 2017-09-29 PROCEDURE — 36000067 ZZH SURGERY LEVEL 5 1ST 30 MIN: Performed by: SURGERY

## 2017-09-29 PROCEDURE — 99223 1ST HOSP IP/OBS HIGH 75: CPT | Performed by: INTERNAL MEDICINE

## 2017-09-29 PROCEDURE — 37000008 ZZH ANESTHESIA TECHNICAL FEE, 1ST 30 MIN: Performed by: SURGERY

## 2017-09-29 PROCEDURE — C1887 CATHETER, GUIDING: HCPCS | Performed by: SURGERY

## 2017-09-29 PROCEDURE — 25000566 ZZH SEVOFLURANE, EA 15 MIN: Performed by: SURGERY

## 2017-09-29 PROCEDURE — 85027 COMPLETE CBC AUTOMATED: CPT | Performed by: SURGERY

## 2017-09-29 DEVICE — IMPLANTABLE DEVICE: Type: IMPLANTABLE DEVICE | Site: AORTA | Status: FUNCTIONAL

## 2017-09-29 DEVICE — IMPLANTABLE DEVICE: Type: IMPLANTABLE DEVICE | Site: ILIAC/FEMORALS | Status: FUNCTIONAL

## 2017-09-29 DEVICE — STENT GRAFT ENDURANT II CONTRALATERAL LIMB 16X13X124MM: Type: IMPLANTABLE DEVICE | Site: AORTA | Status: FUNCTIONAL

## 2017-09-29 RX ORDER — OXYCODONE HYDROCHLORIDE 5 MG/1
5 TABLET ORAL
Status: DISCONTINUED | OUTPATIENT
Start: 2017-09-29 | End: 2017-10-01 | Stop reason: HOSPADM

## 2017-09-29 RX ORDER — SODIUM CHLORIDE 9 MG/ML
INJECTION, SOLUTION INTRAVENOUS CONTINUOUS
Status: DISCONTINUED | OUTPATIENT
Start: 2017-09-29 | End: 2017-09-30 | Stop reason: CLARIF

## 2017-09-29 RX ORDER — ATORVASTATIN CALCIUM 10 MG/1
20 TABLET, FILM COATED ORAL AT BEDTIME
Status: DISCONTINUED | OUTPATIENT
Start: 2017-09-29 | End: 2017-10-01 | Stop reason: HOSPADM

## 2017-09-29 RX ORDER — ACETAMINOPHEN 325 MG/1
975 TABLET ORAL EVERY 8 HOURS
Status: DISCONTINUED | OUTPATIENT
Start: 2017-09-29 | End: 2017-10-01 | Stop reason: HOSPADM

## 2017-09-29 RX ORDER — ONDANSETRON 2 MG/ML
4 INJECTION INTRAMUSCULAR; INTRAVENOUS EVERY 6 HOURS PRN
Status: DISCONTINUED | OUTPATIENT
Start: 2017-09-29 | End: 2017-10-01 | Stop reason: HOSPADM

## 2017-09-29 RX ORDER — LIDOCAINE HYDROCHLORIDE 20 MG/ML
INJECTION, SOLUTION INFILTRATION; PERINEURAL PRN
Status: DISCONTINUED | OUTPATIENT
Start: 2017-09-29 | End: 2017-09-29

## 2017-09-29 RX ORDER — ONDANSETRON 4 MG/1
4 TABLET, ORALLY DISINTEGRATING ORAL EVERY 30 MIN PRN
Status: DISCONTINUED | OUTPATIENT
Start: 2017-09-29 | End: 2017-09-29 | Stop reason: HOSPADM

## 2017-09-29 RX ORDER — ALUMINA, MAGNESIA, AND SIMETHICONE 2400; 2400; 240 MG/30ML; MG/30ML; MG/30ML
30 SUSPENSION ORAL EVERY 4 HOURS PRN
Status: DISCONTINUED | OUTPATIENT
Start: 2017-09-29 | End: 2017-10-01 | Stop reason: HOSPADM

## 2017-09-29 RX ORDER — ATORVASTATIN CALCIUM 10 MG/1
10 TABLET, FILM COATED ORAL AT BEDTIME
Status: DISCONTINUED | OUTPATIENT
Start: 2017-09-29 | End: 2017-09-29

## 2017-09-29 RX ORDER — HYDROMORPHONE HYDROCHLORIDE 1 MG/ML
.3-.5 INJECTION, SOLUTION INTRAMUSCULAR; INTRAVENOUS; SUBCUTANEOUS EVERY 5 MIN PRN
Status: DISCONTINUED | OUTPATIENT
Start: 2017-09-29 | End: 2017-09-29 | Stop reason: HOSPADM

## 2017-09-29 RX ORDER — HEPARIN SODIUM 5000 [USP'U]/.5ML
5000 INJECTION, SOLUTION INTRAVENOUS; SUBCUTANEOUS EVERY 8 HOURS
Status: DISCONTINUED | OUTPATIENT
Start: 2017-09-30 | End: 2017-10-01 | Stop reason: HOSPADM

## 2017-09-29 RX ORDER — IOPAMIDOL 612 MG/ML
INJECTION, SOLUTION INTRAVASCULAR PRN
Status: DISCONTINUED | OUTPATIENT
Start: 2017-09-29 | End: 2017-09-29 | Stop reason: HOSPADM

## 2017-09-29 RX ORDER — ONDANSETRON 2 MG/ML
4 INJECTION INTRAMUSCULAR; INTRAVENOUS EVERY 30 MIN PRN
Status: DISCONTINUED | OUTPATIENT
Start: 2017-09-29 | End: 2017-09-29 | Stop reason: HOSPADM

## 2017-09-29 RX ORDER — NEOSTIGMINE METHYLSULFATE 1 MG/ML
VIAL (ML) INJECTION PRN
Status: DISCONTINUED | OUTPATIENT
Start: 2017-09-29 | End: 2017-09-29

## 2017-09-29 RX ORDER — LISINOPRIL 10 MG/1
10 TABLET ORAL AT BEDTIME
Status: DISCONTINUED | OUTPATIENT
Start: 2017-09-29 | End: 2017-10-01 | Stop reason: HOSPADM

## 2017-09-29 RX ORDER — PROPOFOL 10 MG/ML
INJECTION, EMULSION INTRAVENOUS PRN
Status: DISCONTINUED | OUTPATIENT
Start: 2017-09-29 | End: 2017-09-29

## 2017-09-29 RX ORDER — PROCHLORPERAZINE MALEATE 5 MG
5-10 TABLET ORAL EVERY 6 HOURS PRN
Status: DISCONTINUED | OUTPATIENT
Start: 2017-09-29 | End: 2017-10-01 | Stop reason: HOSPADM

## 2017-09-29 RX ORDER — NALOXONE HYDROCHLORIDE 0.4 MG/ML
.1-.4 INJECTION, SOLUTION INTRAMUSCULAR; INTRAVENOUS; SUBCUTANEOUS
Status: DISCONTINUED | OUTPATIENT
Start: 2017-09-29 | End: 2017-10-01 | Stop reason: HOSPADM

## 2017-09-29 RX ORDER — SODIUM CHLORIDE, SODIUM LACTATE, POTASSIUM CHLORIDE, CALCIUM CHLORIDE 600; 310; 30; 20 MG/100ML; MG/100ML; MG/100ML; MG/100ML
INJECTION, SOLUTION INTRAVENOUS CONTINUOUS
Status: DISCONTINUED | OUTPATIENT
Start: 2017-09-29 | End: 2017-09-29 | Stop reason: HOSPADM

## 2017-09-29 RX ORDER — METFORMIN HCL 500 MG
1000 TABLET, EXTENDED RELEASE 24 HR ORAL
Status: DISCONTINUED | OUTPATIENT
Start: 2017-09-29 | End: 2017-09-29

## 2017-09-29 RX ORDER — BUPIVACAINE HYDROCHLORIDE AND EPINEPHRINE 2.5; 5 MG/ML; UG/ML
INJECTION, SOLUTION INFILTRATION; PERINEURAL PRN
Status: DISCONTINUED | OUTPATIENT
Start: 2017-09-29 | End: 2017-09-29 | Stop reason: HOSPADM

## 2017-09-29 RX ORDER — ACETAMINOPHEN 325 MG/1
650 TABLET ORAL EVERY 4 HOURS PRN
Status: DISCONTINUED | OUTPATIENT
Start: 2017-10-02 | End: 2017-10-01 | Stop reason: HOSPADM

## 2017-09-29 RX ORDER — FLUTICASONE PROPIONATE 220 UG/1
1 AEROSOL, METERED RESPIRATORY (INHALATION) DAILY PRN
Status: DISCONTINUED | OUTPATIENT
Start: 2017-09-29 | End: 2017-10-01 | Stop reason: HOSPADM

## 2017-09-29 RX ORDER — HYDROMORPHONE HYDROCHLORIDE 1 MG/ML
.3-.5 INJECTION, SOLUTION INTRAMUSCULAR; INTRAVENOUS; SUBCUTANEOUS
Status: DISCONTINUED | OUTPATIENT
Start: 2017-09-29 | End: 2017-10-01 | Stop reason: HOSPADM

## 2017-09-29 RX ORDER — ALBUTEROL SULFATE 0.83 MG/ML
2.5 SOLUTION RESPIRATORY (INHALATION) EVERY 4 HOURS PRN
Status: DISCONTINUED | OUTPATIENT
Start: 2017-09-29 | End: 2017-09-29 | Stop reason: HOSPADM

## 2017-09-29 RX ORDER — ALBUTEROL SULFATE 90 UG/1
1-2 AEROSOL, METERED RESPIRATORY (INHALATION) EVERY 4 HOURS PRN
Status: DISCONTINUED | OUTPATIENT
Start: 2017-09-29 | End: 2017-10-01 | Stop reason: HOSPADM

## 2017-09-29 RX ORDER — CEFAZOLIN SODIUM 2 G/100ML
2 INJECTION, SOLUTION INTRAVENOUS
Status: COMPLETED | OUTPATIENT
Start: 2017-09-29 | End: 2017-09-29

## 2017-09-29 RX ORDER — METOPROLOL TARTRATE 1 MG/ML
5 INJECTION, SOLUTION INTRAVENOUS EVERY 6 HOURS PRN
Status: DISCONTINUED | OUTPATIENT
Start: 2017-09-29 | End: 2017-10-01 | Stop reason: HOSPADM

## 2017-09-29 RX ORDER — LORAZEPAM 0.5 MG/1
.5-1 TABLET ORAL EVERY 6 HOURS PRN
Status: DISCONTINUED | OUTPATIENT
Start: 2017-09-29 | End: 2017-10-01 | Stop reason: HOSPADM

## 2017-09-29 RX ORDER — DEXTROSE MONOHYDRATE 25 G/50ML
25-50 INJECTION, SOLUTION INTRAVENOUS
Status: DISCONTINUED | OUTPATIENT
Start: 2017-09-29 | End: 2017-10-01 | Stop reason: HOSPADM

## 2017-09-29 RX ORDER — FLUTICASONE PROPIONATE 220 UG/1
1 AEROSOL, METERED RESPIRATORY (INHALATION) DAILY PRN
COMMUNITY
End: 2018-03-19

## 2017-09-29 RX ORDER — ALBUTEROL SULFATE 90 UG/1
1-2 AEROSOL, METERED RESPIRATORY (INHALATION) EVERY 4 HOURS PRN
COMMUNITY
End: 2018-03-19

## 2017-09-29 RX ORDER — ASPIRIN 325 MG
325 TABLET ORAL DAILY
Status: DISCONTINUED | OUTPATIENT
Start: 2017-09-29 | End: 2017-10-01 | Stop reason: HOSPADM

## 2017-09-29 RX ORDER — GLYCOPYRROLATE 0.2 MG/ML
INJECTION, SOLUTION INTRAMUSCULAR; INTRAVENOUS PRN
Status: DISCONTINUED | OUTPATIENT
Start: 2017-09-29 | End: 2017-09-29

## 2017-09-29 RX ORDER — CEFAZOLIN SODIUM 1 G/3ML
1 INJECTION, POWDER, FOR SOLUTION INTRAMUSCULAR; INTRAVENOUS EVERY 8 HOURS
Status: COMPLETED | OUTPATIENT
Start: 2017-09-29 | End: 2017-09-30

## 2017-09-29 RX ORDER — LEVETIRACETAM 250 MG/1
250 TABLET ORAL 2 TIMES DAILY
Status: DISCONTINUED | OUTPATIENT
Start: 2017-09-29 | End: 2017-10-01 | Stop reason: HOSPADM

## 2017-09-29 RX ORDER — ACYCLOVIR 200 MG/1
400 CAPSULE ORAL 2 TIMES DAILY
Status: DISCONTINUED | OUTPATIENT
Start: 2017-09-29 | End: 2017-10-01 | Stop reason: HOSPADM

## 2017-09-29 RX ORDER — FENTANYL CITRATE 50 UG/ML
25-50 INJECTION, SOLUTION INTRAMUSCULAR; INTRAVENOUS
Status: DISCONTINUED | OUTPATIENT
Start: 2017-09-29 | End: 2017-09-29 | Stop reason: HOSPADM

## 2017-09-29 RX ORDER — NICOTINE POLACRILEX 4 MG
15-30 LOZENGE BUCCAL
Status: DISCONTINUED | OUTPATIENT
Start: 2017-09-29 | End: 2017-10-01 | Stop reason: HOSPADM

## 2017-09-29 RX ORDER — AMOXICILLIN 250 MG
1-2 CAPSULE ORAL 2 TIMES DAILY
Status: DISCONTINUED | OUTPATIENT
Start: 2017-09-29 | End: 2017-10-01 | Stop reason: HOSPADM

## 2017-09-29 RX ORDER — OXYCODONE HYDROCHLORIDE 5 MG/1
5 TABLET ORAL EVERY 4 HOURS PRN
Qty: 18 TABLET | Refills: 0 | Status: SHIPPED | OUTPATIENT
Start: 2017-09-29 | End: 2017-10-02

## 2017-09-29 RX ORDER — MAGNESIUM HYDROXIDE 1200 MG/15ML
LIQUID ORAL PRN
Status: DISCONTINUED | OUTPATIENT
Start: 2017-09-29 | End: 2017-09-29 | Stop reason: HOSPADM

## 2017-09-29 RX ORDER — DEXAMETHASONE SODIUM PHOSPHATE 4 MG/ML
INJECTION, SOLUTION INTRA-ARTICULAR; INTRALESIONAL; INTRAMUSCULAR; INTRAVENOUS; SOFT TISSUE PRN
Status: DISCONTINUED | OUTPATIENT
Start: 2017-09-29 | End: 2017-09-29

## 2017-09-29 RX ORDER — SODIUM CHLORIDE 9 MG/ML
INJECTION, SOLUTION INTRAVENOUS CONTINUOUS PRN
Status: DISCONTINUED | OUTPATIENT
Start: 2017-09-29 | End: 2017-09-29

## 2017-09-29 RX ORDER — ONDANSETRON 4 MG/1
4 TABLET, ORALLY DISINTEGRATING ORAL EVERY 6 HOURS PRN
Status: DISCONTINUED | OUTPATIENT
Start: 2017-09-29 | End: 2017-10-01 | Stop reason: HOSPADM

## 2017-09-29 RX ORDER — FENTANYL CITRATE 50 UG/ML
50-100 INJECTION, SOLUTION INTRAMUSCULAR; INTRAVENOUS EVERY 5 MIN PRN
Status: DISCONTINUED | OUTPATIENT
Start: 2017-09-29 | End: 2017-09-29 | Stop reason: HOSPADM

## 2017-09-29 RX ORDER — LIDOCAINE 40 MG/G
CREAM TOPICAL
Status: DISCONTINUED | OUTPATIENT
Start: 2017-09-29 | End: 2017-10-01 | Stop reason: HOSPADM

## 2017-09-29 RX ORDER — ASPIRIN 81 MG/1
81 TABLET ORAL EVERY MORNING
Status: DISCONTINUED | OUTPATIENT
Start: 2017-09-30 | End: 2017-09-29

## 2017-09-29 RX ORDER — HYDRALAZINE HYDROCHLORIDE 20 MG/ML
2.5-5 INJECTION INTRAMUSCULAR; INTRAVENOUS EVERY 10 MIN PRN
Status: DISCONTINUED | OUTPATIENT
Start: 2017-09-29 | End: 2017-09-29 | Stop reason: HOSPADM

## 2017-09-29 RX ORDER — MEPERIDINE HYDROCHLORIDE 25 MG/ML
12.5 INJECTION INTRAMUSCULAR; INTRAVENOUS; SUBCUTANEOUS EVERY 5 MIN PRN
Status: DISCONTINUED | OUTPATIENT
Start: 2017-09-29 | End: 2017-09-29 | Stop reason: HOSPADM

## 2017-09-29 RX ORDER — ONDANSETRON 2 MG/ML
INJECTION INTRAMUSCULAR; INTRAVENOUS PRN
Status: DISCONTINUED | OUTPATIENT
Start: 2017-09-29 | End: 2017-09-29

## 2017-09-29 RX ORDER — FENTANYL CITRATE 50 UG/ML
INJECTION, SOLUTION INTRAMUSCULAR; INTRAVENOUS PRN
Status: DISCONTINUED | OUTPATIENT
Start: 2017-09-29 | End: 2017-09-29

## 2017-09-29 RX ADMIN — FENTANYL CITRATE 25 MCG: 50 INJECTION, SOLUTION INTRAMUSCULAR; INTRAVENOUS at 11:48

## 2017-09-29 RX ADMIN — PROPOFOL 20 MG: 10 INJECTION, EMULSION INTRAVENOUS at 11:54

## 2017-09-29 RX ADMIN — ASPIRIN 325 MG ORAL TABLET 325 MG: 325 PILL ORAL at 16:16

## 2017-09-29 RX ADMIN — PHENYLEPHRINE HYDROCHLORIDE 50 MCG: 10 INJECTION, SOLUTION INTRAMUSCULAR; INTRAVENOUS; SUBCUTANEOUS at 11:36

## 2017-09-29 RX ADMIN — HEPARIN SODIUM 2000 ML: 1000 INJECTION, SOLUTION INTRAVENOUS; SUBCUTANEOUS at 09:38

## 2017-09-29 RX ADMIN — PHENYLEPHRINE HYDROCHLORIDE 100 MCG: 10 INJECTION, SOLUTION INTRAMUSCULAR; INTRAVENOUS; SUBCUTANEOUS at 11:18

## 2017-09-29 RX ADMIN — SENNOSIDES AND DOCUSATE SODIUM 1 TABLET: 8.6; 5 TABLET ORAL at 21:16

## 2017-09-29 RX ADMIN — HEPARIN SODIUM 6000 ML: 1000 INJECTION, SOLUTION INTRAVENOUS; SUBCUTANEOUS at 08:41

## 2017-09-29 RX ADMIN — PHENYLEPHRINE HYDROCHLORIDE 50 MCG: 10 INJECTION, SOLUTION INTRAMUSCULAR; INTRAVENOUS; SUBCUTANEOUS at 08:14

## 2017-09-29 RX ADMIN — ATORVASTATIN CALCIUM 20 MG: 10 TABLET, FILM COATED ORAL at 21:16

## 2017-09-29 RX ADMIN — SODIUM CHLORIDE: 9 INJECTION, SOLUTION INTRAVENOUS at 10:01

## 2017-09-29 RX ADMIN — NEOSTIGMINE METHYLSULFATE 3 MG: 1 INJECTION INTRAMUSCULAR; INTRAVENOUS; SUBCUTANEOUS at 11:43

## 2017-09-29 RX ADMIN — LISINOPRIL 10 MG: 10 TABLET ORAL at 21:16

## 2017-09-29 RX ADMIN — PHENYLEPHRINE HYDROCHLORIDE 100 MCG: 10 INJECTION, SOLUTION INTRAMUSCULAR; INTRAVENOUS; SUBCUTANEOUS at 09:48

## 2017-09-29 RX ADMIN — HEPARIN SODIUM 2000 ML: 1000 INJECTION, SOLUTION INTRAVENOUS; SUBCUTANEOUS at 10:30

## 2017-09-29 RX ADMIN — CEFAZOLIN SODIUM 1 G: 2 INJECTION, SOLUTION INTRAVENOUS at 09:43

## 2017-09-29 RX ADMIN — PHENYLEPHRINE HYDROCHLORIDE 50 MCG: 10 INJECTION, SOLUTION INTRAMUSCULAR; INTRAVENOUS; SUBCUTANEOUS at 11:13

## 2017-09-29 RX ADMIN — SODIUM CHLORIDE, POTASSIUM CHLORIDE, SODIUM LACTATE AND CALCIUM CHLORIDE: 600; 310; 30; 20 INJECTION, SOLUTION INTRAVENOUS at 07:11

## 2017-09-29 RX ADMIN — PROPOFOL 30 MG: 10 INJECTION, EMULSION INTRAVENOUS at 11:50

## 2017-09-29 RX ADMIN — ACYCLOVIR 400 MG: 200 CAPSULE ORAL at 21:16

## 2017-09-29 RX ADMIN — PHENYLEPHRINE HYDROCHLORIDE 0.3 MCG/KG/MIN: 10 INJECTION, SOLUTION INTRAMUSCULAR; INTRAVENOUS; SUBCUTANEOUS at 08:20

## 2017-09-29 RX ADMIN — FENTANYL CITRATE 50 MCG: 50 INJECTION, SOLUTION INTRAMUSCULAR; INTRAVENOUS at 08:11

## 2017-09-29 RX ADMIN — PHENYLEPHRINE HYDROCHLORIDE 100 MCG: 10 INJECTION, SOLUTION INTRAMUSCULAR; INTRAVENOUS; SUBCUTANEOUS at 11:02

## 2017-09-29 RX ADMIN — SODIUM CHLORIDE: 9 INJECTION, SOLUTION INTRAVENOUS at 10:02

## 2017-09-29 RX ADMIN — DEXAMETHASONE SODIUM PHOSPHATE 4 MG: 4 INJECTION, SOLUTION INTRA-ARTICULAR; INTRALESIONAL; INTRAMUSCULAR; INTRAVENOUS; SOFT TISSUE at 08:26

## 2017-09-29 RX ADMIN — FENTANYL CITRATE 25 MCG: 50 INJECTION, SOLUTION INTRAMUSCULAR; INTRAVENOUS at 06:53

## 2017-09-29 RX ADMIN — GLYCOPYRROLATE 0.4 MG: 0.2 INJECTION, SOLUTION INTRAMUSCULAR; INTRAVENOUS at 11:43

## 2017-09-29 RX ADMIN — SODIUM CHLORIDE, POTASSIUM CHLORIDE, SODIUM LACTATE AND CALCIUM CHLORIDE: 600; 310; 30; 20 INJECTION, SOLUTION INTRAVENOUS at 10:51

## 2017-09-29 RX ADMIN — CEFAZOLIN SODIUM 2 G: 2 INJECTION, SOLUTION INTRAVENOUS at 07:43

## 2017-09-29 RX ADMIN — ROCURONIUM BROMIDE 50 MG: 10 INJECTION INTRAVENOUS at 07:37

## 2017-09-29 RX ADMIN — CEFAZOLIN SODIUM 1 G: 1 INJECTION, POWDER, FOR SOLUTION INTRAMUSCULAR; INTRAVENOUS at 18:02

## 2017-09-29 RX ADMIN — ACETAMINOPHEN 975 MG: 325 TABLET, FILM COATED ORAL at 16:16

## 2017-09-29 RX ADMIN — PROPOFOL 100 MG: 10 INJECTION, EMULSION INTRAVENOUS at 07:37

## 2017-09-29 RX ADMIN — SODIUM CHLORIDE: 9 INJECTION, SOLUTION INTRAVENOUS at 07:44

## 2017-09-29 RX ADMIN — FENTANYL CITRATE 100 MCG: 50 INJECTION, SOLUTION INTRAMUSCULAR; INTRAVENOUS at 07:37

## 2017-09-29 RX ADMIN — PROPOFOL 20 MG: 10 INJECTION, EMULSION INTRAVENOUS at 11:45

## 2017-09-29 RX ADMIN — HEPARIN SODIUM 2000 ML: 1000 INJECTION, SOLUTION INTRAVENOUS; SUBCUTANEOUS at 08:51

## 2017-09-29 RX ADMIN — HEPARIN SODIUM 2000 ML: 1000 INJECTION, SOLUTION INTRAVENOUS; SUBCUTANEOUS at 09:04

## 2017-09-29 RX ADMIN — INSULIN ASPART 1 UNITS: 100 INJECTION, SOLUTION INTRAVENOUS; SUBCUTANEOUS at 17:16

## 2017-09-29 RX ADMIN — SODIUM CHLORIDE: 9 INJECTION, SOLUTION INTRAVENOUS at 15:59

## 2017-09-29 RX ADMIN — LIDOCAINE HYDROCHLORIDE 80 MG: 20 INJECTION, SOLUTION INFILTRATION; PERINEURAL at 07:37

## 2017-09-29 RX ADMIN — LEVETIRACETAM 250 MG: 250 TABLET, FILM COATED ORAL at 21:17

## 2017-09-29 RX ADMIN — ONDANSETRON 4 MG: 2 INJECTION INTRAMUSCULAR; INTRAVENOUS at 10:57

## 2017-09-29 ASSESSMENT — ACTIVITIES OF DAILY LIVING (ADL)
DRESS: 0-->INDEPENDENT
BATHING: 0-->INDEPENDENT
WHICH_OF_THE_ABOVE_FUNCTIONAL_RISKS_HAD_A_RECENT_ONSET_OR_CHANGE?: AMBULATION
RETIRED_EATING: 0-->INDEPENDENT
FALL_HISTORY_WITHIN_LAST_SIX_MONTHS: NO
RETIRED_COMMUNICATION: 0-->UNDERSTANDS/COMMUNICATES WITHOUT DIFFICULTY
AMBULATION: 1-->ASSISTIVE EQUIPMENT
SWALLOWING: 0-->SWALLOWS FOODS/LIQUIDS WITHOUT DIFFICULTY
TOILETING: 0-->INDEPENDENT
COGNITION: 0 - NO COGNITION ISSUES REPORTED
TRANSFERRING: 1-->ASSISTIVE EQUIPMENT

## 2017-09-29 ASSESSMENT — COPD QUESTIONNAIRES: COPD: 1

## 2017-09-29 NOTE — IP AVS SNAPSHOT
MRN:[id number]                      After Visit Summary   9/29/2017    Franklyn Hien    MRN: [id number]           Thank you!     Thank you for choosing [facility] for your care. Our goal is always to provide you with excellent care. Hearing back from our patients is one way we can continue to improve our services. Please take a few minutes to complete the written survey that you may receive in the mail after you visit with us. Thank you!        Patient Information     Date Of Birth          10/28/1932        Designated Caregiver       Most Recent Value    Caregiver    Will someone help with your care after discharge? no    Name of designated caregiver Jennifer    Phone number of caregiver -- [see other charting]      About your hospital stay     You were admitted on:  September 29, 2017 You last received care in the:  [place] [facility] [address] [facility]    You were discharged on:  October 1, 2017        Reason for your hospital stay       Endovascular Repair of Abdominal Aortic Aneurysm                  Who to Call     For medical emergencies, please call 911.  For non-urgent questions about your medical care, please call your primary care provider or clinic, [phone]  For questions related to your surgery, please call your surgery clinic        Attending Provider     Provider Specialty    Juanito Landaverde MD Vascular Surgery       Primary Care Provider Office Phone # Fax #    Melvin Hoskins [phone]       When to contact your care team       Intractable pain localized to the groin/abdomen. Fevers, chills.  Pain, redness/purulent drainage from the groin wounds.                  After Care Instructions     Activity       Your activity upon discharge: No heavy lifting > 10lbs, no pushing, pulling for 6 weeks.            Diet       Follow this diet upon discharge: Orders Placed This Encounter      Moderate Consistent CHO Diet            Wound care and dressings        Instructions to care for your wound at home: Keep incision open to air. Dermabond is intact over the wound.  May shower. Sponge over wound with soap and water. Do not scrub. Pat dry. Do not submerge wound underwater.                  Follow-up Appointments     Follow-up and recommended labs and tests        Follow up with Dr. Juanito Landaverde MD at [facility] for post-operative                  Further instructions from your care team         Endovascular Abdominal Aortic Aneurysm Repair  Post-Procedure Instructions    Incision Care    You will have an incision in one or both groins.  There will be white strips of tape, called steri-strips, applied over the incisions.  The ends will loosen after 5-7 days, at which time they can be removed.      Cover your incisions with dry gauze and change as needed.      You may shower 2-3 days after your surgery - pat the incision dry to prevent irritation from moisture.        You may develop bruising and firmness near your incision.  This will soften and resolve over the next 1-3 weeks.  Call our office if it enlarges, becomes red, or painful.      On occasion a soft, fluid-filled bulge can develop near a surgical incision - this is called a seroma.  It will likely resolve on its own, but occasionally requires your doctor to drain it in clinic.  You should call our office if you have questions about this.      You may notice numbness around your incision.  This is due to nerve irritation from the surgery and will gradually improve over the next several weeks.    Activity    Walking is important after surgery.  You will begin walking before you leave the hospital, and then you should gradually increase your distance as tolerated.  You should be taking at least 3-4 short walks a day.      You may climb stairs when you feel strong enough.      You should not drive for 1-2 weeks or while taking prescription strength pain medication.   You must feel strong enough  to drive safely.      You may return to work once you feel ready - this can be decided at your 2 week post-operative visit.      You should avoid strenuous activity, including running, biking, or weight-lifting until discussed at your post-operative appointment.    Diet    You may resume a regular diet before you leave the hospital.  You may find that it is best to try smaller, more frequent meals until your appetite returns.        Medications    Resume all previous medications as prior to surgery, unless otherwise instructed.      See further information regarding medications containing Metformin/ NA _______    Special Instructions    Carry your stent graft card with you at all times; there may be restrictions pertaining to MRI testing for one month from the date of your procedure.      If you will be having an invasive procedure, such as a colonoscopy or dental work, within one year of your surgery, you will need to take an antibiotic prior to the procedure.  Please call us so we can assist you with this.    Follow-Up Appointments    You will need to see your surgeon 10-14 days after your procedure.  o Please call [phone] to schedule this appointment.  o Date: _________________________________  o Time: ________________________________  o Dr. ___________________________________  o Location: [facility], [address] [place] [address], [facility]      You will also see either your surgeon or your Interventional Radiologist 1 month after your procedure and annually thereafter.  We will arrange for you to have a CT angiogram prior to these appointments and will notify you by mail when you are due to schedule.    When to Call our Office      Temperature greater than 101.      If you notice new onset of numbness, tingling, coolness or pain in your leg.      Severe pain, especially if it is new and preventing sleep.    Call our main number, [phone], for assistance with any concerns or questions.  For  "Interventional Radiology Nurse, call [phone].    Revised 2014      **Follow-up with your primary doctor this week to address urinary retention**      Pending Results     Date and Time Order Name Status Description    2017 0521 IR Abdominal Endovascular Stent Graft Preliminary     2017 0000 EKG CARDIAC - HIM SCAN Preliminary             Statement of Approval     Ordered          17 1101  I have reviewed and agree with all the recommendations and orders detailed in this document.  EFFECTIVE NOW     Approved and electronically signed by:  Lejeune, Stacey, MD             Admission Information     Date & Time Provider Department Dept. Phone    2017 Juanito Landaverde MD [place] [address] [facility] [phone]      Your Vitals Were     Blood Pressure Pulse Temperature Respirations Height Weight    129/57 (BP Location: Left arm) 83 97.5  F (36.4  C) (Oral) 16 1.778 m (5' 10\") 61.8 kg (136 lb 3.9 oz)    Pulse Oximetry BMI (Body Mass Index)                97% 19.55 kg/m2          [employer] Information     [employer] lets you send messages to your doctor, view your test results, renew your prescriptions, schedule appointments and more. To sign up, go to [email or web address] . Click on \"Log in\" on the left side of the screen, which will take you to the Welcome page. Then click on \"Sign up Now\" on the right side of the page.     You will be asked to enter the access code listed below, as well as some personal information. Please follow the directions to create your username and password.     Your access code is: [email or web address]  Expires: 2017 12:36 PM     Your access code will  in 90 days. If you need help or a new code, please call your [facility] clinic or [phone].        Care EveryWhere ID     This is your Care EveryWhere ID. This could be used by other organizations to access your [facility] medical records  [id number]        Equal Access to Services     LO FRYE AH: " Hadii aad ku hadaurelioo Soomaali, waaxda luqadaha, qaybta kaalmada adegalina, klever vale. So [facility] [phone].    ATENCIÓN: Si jean-pierre townsend, tiene a bartlett disposición servicios gratuitos de asistencia lingüística. Llame al [phone].    We comply with applicable federal civil rights laws and [place] laws. We do not discriminate on the basis of race, color, national origin, age, disability, sex, sexual orientation, or gender identity.               Review of your medicines      START taking        Dose / Directions    oxyCODONE 5 MG IR tablet   Commonly known as:  ROXICODONE   Used for:  Seizure (H)        Dose:  5 mg   Take 1 tablet (5 mg) by mouth every 4 hours as needed for moderate to severe pain or pain maximum 8 tablet(s) per day   Quantity:  18 tablet   Refills:  0         CONTINUE these medicines which have NOT CHANGED        Dose / Directions    ACYCLOVIR PO   Notes to Patient:  Resume home schedule.        Dose:  400 mg   Take 400 mg by mouth 2 times daily (preventative-Chemo)   Refills:  0       albuterol 108 (90 BASE) MCG/ACT Inhaler   Commonly known as:  PROAIR HFA/PROVENTIL HFA/VENTOLIN HFA        Dose:  1-2 puff   Inhale 1-2 puffs into the lungs every 4 hours as needed for shortness of breath / dyspnea or wheezing   Refills:  0       AMLODIPINE BESYLATE PO   Notes to Patient:  Resume home schedule.        Dose:  2.5 mg   Take 2.5 mg by mouth At Bedtime   Refills:  0       ASPIRIN EC PO   Notes to Patient:  Resume home schedule.        Dose:  81 mg   Take 81 mg by mouth every morning   Refills:  0       ATORVASTATIN CALCIUM PO   Notes to Patient:  Resume home schedule.        Dose:  10 mg   Take 10 mg by mouth At Bedtime   Refills:  0       CLEAR EYES MAXIMUM ITCHY EYE 0.012-0.25-0.25 % Soln   Generic drug:  Naphazoline-Glycerin-Zinc Sulf        Dose:  1-2 drop   Place 1-2 drops into both eyes 4 times daily as needed (itch relief)   Refills:  0       CLEAR EYES TRIPLE  ACTION 0.05-0.5-0.6 % Soln   Generic drug:  Tetrahydroz-Polyvinyl Al-Povid        Dose:  1-2 drop   Place 1-2 drops into both eyes 4 times daily as needed   Refills:  0       fluticasone 220 MCG/ACT Inhaler   Commonly known as:  FLOVENT HFA        Dose:  1 puff   Inhale 1 puff into the lungs daily as needed   Refills:  0       fluticasone-salmeterol 250-50 MCG/DOSE diskus inhaler   Commonly known as:  ADVAIR        Dose:  1 puff   Inhale 1 puff into the lungs every 12 hours as needed   Refills:  0       GLUCOPHAGE XR PO   Notes to Patient:  Resume home schedule.        Dose:  1000 mg   Take 1,000 mg by mouth daily with food In the afternoon with a snack. (patient takes 2 X 500 = 1,000 mg dose)   Refills:  0       levETIRAcetam 250 MG tablet   Commonly known as:  KEPPRA   Used for:  Seizure (H)   Notes to Patient:  Resume home schedule.        Dose:  250 mg   Take 1 tablet (250 mg) by mouth 2 times daily   Quantity:  60 tablet   Refills:  0       LISINOPRIL PO   Notes to Patient:  Resume home schedule.        Dose:  10 mg   Take 10 mg by mouth At Bedtime   Refills:  0       LORAZEPAM PO        Dose:  0.5-1 mg   Take 0.5-1 mg by mouth every 6 hours as needed for anxiety or nausea   Refills:  0       PROCHLORPERAZINE MALEATE PO        Dose:  5-10 mg   Take 5-10 mg by mouth every 6 hours as needed for nausea (Chemo)   Refills:  0            Where to get your medicines      Some of these will need a paper prescription and others can be bought over the counter. Ask your nurse if you have questions.     Bring a paper prescription for each of these medications     oxyCODONE 5 MG IR tablet                Protect others around you: Learn how to safely use, store and throw away your medicines at www.disposemymeds.org.             Medication List: This is a list of all your medications and when to take them. Check marks below indicate your daily home schedule. Keep this list as a reference.      Medications           Morning  Afternoon Evening Bedtime As Needed    ACYCLOVIR PO   Take 400 mg by mouth 2 times daily (preventative-Chemo)   Last time this was given:  400 mg on 10/1/2017  9:30 AM   Next Dose Due:  10/1 pm   Notes to Patient:  Resume home schedule.                       10/1               albuterol 108 (90 BASE) MCG/ACT Inhaler   Commonly known as:  PROAIR HFA/PROVENTIL HFA/VENTOLIN HFA   Inhale 1-2 puffs into the lungs every 4 hours as needed for shortness of breath / dyspnea or wheezing                                   AMLODIPINE BESYLATE PO   Take 2.5 mg by mouth At Bedtime   Next Dose Due:  10/1 pm   Notes to Patient:  Resume home schedule.                        10/1           ASPIRIN EC PO   Take 81 mg by mouth every morning   Next Dose Due:  10/2 am   Notes to Patient:  Resume home schedule.            10/2                       ATORVASTATIN CALCIUM PO   Take 10 mg by mouth At Bedtime   Last time this was given:  20 mg on 9/30/2017  9:03 PM   Next Dose Due:  10/1   Notes to Patient:  Resume home schedule.                        10/1           CLEAR EYES MAXIMUM ITCHY EYE 0.012-0.25-0.25 % Soln   Place 1-2 drops into both eyes 4 times daily as needed (itch relief)   Generic drug:  Naphazoline-Glycerin-Zinc Sulf                                   CLEAR EYES TRIPLE ACTION 0.05-0.5-0.6 % Soln   Place 1-2 drops into both eyes 4 times daily as needed   Generic drug:  Tetrahydroz-Polyvinyl Al-Povid                                   fluticasone 220 MCG/ACT Inhaler   Commonly known as:  FLOVENT HFA   Inhale 1 puff into the lungs daily as needed                                   fluticasone-salmeterol 250-50 MCG/DOSE diskus inhaler   Commonly known as:  ADVAIR   Inhale 1 puff into the lungs every 12 hours as needed                                   GLUCOPHAGE XR PO   Take 1,000 mg by mouth daily with food In the afternoon with a snack. (patient takes 2 X 500 = 1,000 mg dose)   Next Dose Due:  10/2   Notes to Patient:  Resume home  schedule.                10/2                   levETIRAcetam 250 MG tablet   Commonly known as:  KEPPRA   Take 1 tablet (250 mg) by mouth 2 times daily   Last time this was given:  250 mg on 10/1/2017  9:31 AM   Next Dose Due:  10/1 pm   Notes to Patient:  Resume home schedule.                       10/1               LISINOPRIL PO   Take 10 mg by mouth At Bedtime   Last time this was given:  10 mg on 9/30/2017  9:04 PM   Next Dose Due:  10/1   Notes to Patient:  Resume home schedule.                        10/1           LORAZEPAM PO   Take 0.5-1 mg by mouth every 6 hours as needed for anxiety or nausea                                   oxyCODONE 5 MG IR tablet   Commonly known as:  ROXICODONE   Take 1 tablet (5 mg) by mouth every 4 hours as needed for moderate to severe pain or pain maximum 8 tablet(s) per day   Last time this was given:  5 mg on 10/1/2017  3:31 PM                                   PROCHLORPERAZINE MALEATE PO   Take 5-10 mg by mouth every 6 hours as needed for nausea (Chemo)

## 2017-09-29 NOTE — PROGRESS NOTES
Admission medication history interview status for the 9/29/2017  admission is complete. See EPIC admission navigator for prior to admission medications     Medication history source reliability:Moderate    Medication history interview source(s):Patient, Family    Medication history resources (including written lists, pill bottles, clinic record):Patient mailed in a med list prior to day of procedure.    Primary pharmacy.[facility], [place] and [facility]    Additional medication history information not noted on PTA med list :None    Time spent in this activity: 45 minutes    Prior to Admission medications    Medication Sig Last Dose Taking? Auth Provider   fluticasone (FLOVENT HFA) 220 MCG/ACT Inhaler Inhale 1 puff into the lungs daily as needed Over 1 month ago at prn Yes Reported, Patient   albuterol (PROAIR HFA/PROVENTIL HFA/VENTOLIN HFA) 108 (90 BASE) MCG/ACT Inhaler Inhale 1-2 puffs into the lungs every 4 hours as needed for shortness of breath / dyspnea or wheezing Over 1 week ago at prn Yes Reported, Patient   ASPIRIN EC PO Take 81 mg by mouth every morning 9/27/2017 at am Yes Reported, Patient   fluticasone-salmeterol (ADVAIR) 250-50 MCG/DOSE diskus inhaler Inhale 1 puff into the lungs every 12 hours as needed 9/28/2017 at 2130 Yes Reported, Patient   Tetrahydroz-Polyvinyl Al-Povid (CLEAR EYES TRIPLE ACTION) 0.05-0.5-0.6 % SOLN Place 1-2 drops into both eyes 4 times daily as needed 9/27/2017 at prn Yes Reported, Patient   Naphazoline-Glycerin-Zinc Sulf (CLEAR EYES MAXIMUM ITCHY EYE) 0.012-0.25-0.25 % SOLN Place 1-2 drops into both eyes 4 times daily as needed (itch relief) 9/28/2017 at am Yes Reported, Patient   MetFORMIN HCl (GLUCOPHAGE XR PO) Take 1,000 mg by mouth daily with food In the afternoon with a snack. (patient takes 2 X 500 = 1,000 mg dose) 9/26/2017 at pm Yes Reported, Patient   levETIRAcetam (KEPPRA) 250 MG tablet Take 1 tablet (250 mg) by mouth 2 times daily 9/29/2017 at 0400 Yes Porfirio Clifton,  MD   PROCHLORPERAZINE MALEATE PO Take 5-10 mg by mouth every 6 hours as needed for nausea (Chemo) Over 2 weeks ago at prn Yes Reported, Patient   LISINOPRIL PO Take 10 mg by mouth At Bedtime  9/28/2017 at 2130 Yes Reported, Patient   LORAZEPAM PO Take 0.5-1 mg by mouth every 6 hours as needed for anxiety or nausea  Over 2 weeks ago at prn Yes Reported, Patient   ATORVASTATIN CALCIUM PO Take 10 mg by mouth At Bedtime  9/27/2017 at hs Yes Reported, Patient   ACYCLOVIR PO Take 400 mg by mouth 2 times daily (preventative-Chemo) 9/28/2017 at 0900 Yes Reported, Patient   AMLODIPINE BESYLATE PO Take 2.5 mg by mouth At Bedtime  9/27/2017 at hs Yes Reported, Patient

## 2017-09-29 NOTE — ANESTHESIA CARE TRANSFER NOTE
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    Anesthesia Type:   General, ETT     Note:  Airway :Face Mask  Patient transferred to:PACU  Comments: Spontaneous respirations, tidal volume > 500, oxygen saturation > 97%, TOF 4/4 with > 5 seconds sustained tetany, follows commands.  Suctioned and extubated with cuff down to facemask.  Exchanging well.Transferred to PACU, spontaneous respirations, 10L oxygen via facemask.  All monitors and alarms on and functioning, VSS.  Patient awake, comfortable.  Report to PACU RN.      Vitals: (Last set prior to Anesthesia Care Transfer)    CRNA VITALS  9/29/2017 1137 - 9/29/2017 1223      9/29/2017             Pulse: 84    ART BP: 151/68    ART Mean: 114    Ht Rate: 82    SpO2: 100 %    Resp Rate (set): 10                Electronically Signed By: OCTAVIO Melo CRNA  September 29, 2017  12:23 PM

## 2017-09-29 NOTE — ANESTHESIA POSTPROCEDURE EVALUATION
Patient: Franklyn Hein    Procedure(s):  EMBOLIZATION LEFT INTERNAL ILIAC ARTERY WITH AMPLATZER PLUG; ENDOVASCULAR ABDOMINAL AORTIC ANEURYMS REPAIR - Wound Class: I-Clean    Diagnosis:7.2 cm abdominal aneurysm, bilateral iliac involvement   Diagnosis Additional Information: No value filed.    Anesthesia Type:  General, ETT    Note:  Anesthesia Post Evaluation    Patient location during evaluation: PACU  Patient participation: Able to fully participate in evaluation  Level of consciousness: awake  Pain management: adequate  Airway patency: patent  Cardiovascular status: acceptable  Respiratory status: acceptable  Hydration status: acceptable  PONV: none     Anesthetic complications: None          Last vitals:  Vitals:    09/29/17 1425 09/29/17 1500 09/29/17 1550   BP: 118/66 108/69 134/74   Resp: 14 16 16   Temp: 36.4  C (97.5  F)  36.3  C (97.3  F)   SpO2: 97% 98% 100%         Electronically Signed By: Ximena Ziegler MD, MD  September 29, 2017  3:56 PM

## 2017-09-29 NOTE — ANESTHESIA PREPROCEDURE EVALUATION
Anesthesia Evaluation     . Pt has had prior anesthetic.     No history of anesthetic complications          ROS/MED HX    ENT/Pulmonary:     (+)COPD, , . .   (-) sleep apnea   Neurologic:       Cardiovascular: Comment: Infrarenal AAA    (+) Dyslipidemia, hypertension----. : . . . :. . Previous cardiac testing date:results:date: results:ECG reviewed date: results:Sinus rhythm with PVCs date: results:          METS/Exercise Tolerance:     Hematologic:     (+) Anemia, Other Hematologic Disorder-Thrombocytopenia      Musculoskeletal:         GI/Hepatic:        (-) GERD   Renal/Genitourinary:         Endo:     (+) type II DM .      Psychiatric:         Infectious Disease:         Malignancy:   (+) Malignancy History of Other  Other CA Multiple myeloma status post         Other:                                    Anesthesia Plan      History & Physical Review  History and physical reviewed and following examination; no interval change.    ASA Status:  3 .    NPO Status:  > 8 hours    Plan for General and ETT with Intravenous induction. Maintenance will be Balanced.    PONV prophylaxis:  Ondansetron (or other 5HT-3) and Dexamethasone or Solumedrol  Additional equipment: Arterial Line and 2nd IV      Postoperative Care  Postoperative pain management:  IV analgesics and Oral pain medications.      Consents  Anesthetic plan, risks, benefits and alternatives discussed with:  Patient..                      Procedure: Procedure(s):  ENDOVASCULAR REPAIR ANEURYSM ABDOMINAL AORTA  Preop diagnosis: 7.2 cm abdominal aneurysm, bilateral iliac involvement     No Known Allergies  Past Medical History:   Diagnosis Date     AAA (abdominal aortic aneurysm) (H)      Anemia     macrocytic     Ataxia      Cancer (H)      COPD (chronic obstructive pulmonary disease) (H)      Diabetes (H)     type 2     Dyspnea      Fatigue      Hypercholesteremia      Hypertension      Multiple myeloma (H)      Nocturia      Pulmonary fibrosis (H)       Tubular adenoma 2007     Past Surgical History:   Procedure Laterality Date     APPENDECTOMY       Prior to Admission medications    Medication Sig Start Date End Date Taking? Authorizing Provider   fluticasone (FLOVENT HFA) 220 MCG/ACT Inhaler Inhale 1 puff into the lungs daily as needed   Yes Reported, Patient   albuterol (PROAIR HFA/PROVENTIL HFA/VENTOLIN HFA) 108 (90 BASE) MCG/ACT Inhaler Inhale 1-2 puffs into the lungs every 4 hours as needed for shortness of breath / dyspnea or wheezing   Yes Reported, Patient   ASPIRIN EC PO Take 81 mg by mouth every morning   Yes Reported, Patient   fluticasone-salmeterol (ADVAIR) 250-50 MCG/DOSE diskus inhaler Inhale 1 puff into the lungs every 12 hours as needed   Yes Reported, Patient   Tetrahydroz-Polyvinyl Al-Povid (CLEAR EYES TRIPLE ACTION) 0.05-0.5-0.6 % SOLN Place 1-2 drops into both eyes 4 times daily as needed   Yes Reported, Patient   Naphazoline-Glycerin-Zinc Sulf (CLEAR EYES MAXIMUM ITCHY EYE) 0.012-0.25-0.25 % SOLN Place 1-2 drops into both eyes 4 times daily as needed (itch relief)   Yes Reported, Patient   MetFORMIN HCl (GLUCOPHAGE XR PO) Take 1,000 mg by mouth daily with food In the afternoon with a snack. (patient takes 2 X 500 = 1,000 mg dose)   Yes Reported, Patient   levETIRAcetam (KEPPRA) 250 MG tablet Take 1 tablet (250 mg) by mouth 2 times daily 9/12/17  Yes Porfirio Clifton MD   PROCHLORPERAZINE MALEATE PO Take 5-10 mg by mouth every 6 hours as needed for nausea (Chemo)   Yes Reported, Patient   LISINOPRIL PO Take 10 mg by mouth At Bedtime    Yes Reported, Patient   LORAZEPAM PO Take 0.5-1 mg by mouth every 6 hours as needed for anxiety or nausea    Yes Reported, Patient   ATORVASTATIN CALCIUM PO Take 10 mg by mouth At Bedtime    Yes Reported, Patient   ACYCLOVIR PO Take 400 mg by mouth 2 times daily (preventative-Chemo)   Yes Reported, Patient   AMLODIPINE BESYLATE PO Take 2.5 mg by mouth At Bedtime    Yes Reported, Patient     Current  Facility-Administered Medications Ordered in Epic   Medication Dose Route Frequency Last Rate Last Dose     ceFAZolin sodium-dextrose (ANCEF) infusion 2 g  2 g Intravenous Pre-Op/Pre-procedure x 1 dose         lidocaine 1 % 1 mL  1 mL Other Q1H PRN         lactated ringers infusion   Intravenous Continuous         No current [facility]-ordered outpatient prescriptions on file.     Wt Readings from Last 1 Encounters:   09/29/17 60.3 kg (133 lb)     Temp Readings from Last 1 Encounters:   09/12/17 35.8  C (96.5  F) (Oral)     BP Readings from Last 6 Encounters:   09/14/17 130/68   09/12/17 101/55     Pulse Readings from Last 4 Encounters:   09/14/17 75   09/10/17 75     Resp Readings from Last 1 Encounters:   09/12/17 16     SpO2 Readings from Last 1 Encounters:   09/14/17 95%     Recent Labs   Lab Test  09/12/17   0538  09/11/17   0610   NA  134  134   POTASSIUM  3.9  4.3   CHLORIDE  105  104   CO2  23  23   ANIONGAP  6  7   GLC  90  170*   BUN  17  31*   CR  0.93  1.12   HAO  7.2*  7.4*     Recent Labs   Lab Test  09/12/17   0538   AST  19   ALT  15     Recent Labs   Lab Test  09/12/17   0538  09/10/17   1932   WBC  6.8  7.5   HGB  9.2*  10.9*   PLT  68*  118*     No results for input(s): INR in the last 04543 hours.    Invalid input(s): APTT   Recent Labs   Lab Test  09/10/17   1932   TROPI  <0.015     RECENT LABS:   ECG:   ECHO:   CXR:

## 2017-09-29 NOTE — BRIEF OP NOTE
[facility] Brief Operative Note    Pre-operative diagnosis: 7.2 cm abdominal aneurysm, bilateral iliac involvement    Post-operative diagnosis Same    Procedure: Procedure(s):  EMBOLIZATION LEFT INTERNAL ILIAC ARTERY WITH AMPLATZER PLUG; ENDOVASCULAR ABDOMINAL AORTIC ANEURYMS REPAIR - Wound Class: I-Clean   Surgeon(s): Surgeon(s) and Role:     * Juanito Landaverde MD - Primary     * William Bella MD - Assisting     * Agus Feng MD - Assisting     * Dom Rios MD - Assisting     * Remigio Isidro MD - Assisting   Estimated blood loss: 100 mL    Specimens: * No specimens in log *   Findings: Left hypogastric artery coiled. Main body 28 x14 x 103 deployed through the right groin. Contra left with 16 x 10 x 199cm. Extended on the right with 16 x 13 x 124cm. Endo anchors deployed due to small Type 1 A endoleak. 6 endo anchors successfully deployed with Aptus.  No endoleak observed. Left extremal iliac stenotic segment ballooned. Palpable pulses observed postoperatively.         William Bella MD   Vascular Surgery Fellow  Pager: [phone]

## 2017-09-29 NOTE — DISCHARGE SUMMARY
[facility] Discharge Summary    Franklyn Hein MRN# [id number]   Age: 84 year old YOB: 1932     Date of Admission:  9/29/2017  Date of Discharge::  09/30/2017  Admitting Physician:  Juanito Landaverde MD  Discharge Physician:  William Bella MD             Admission Diagnoses:   7.2 cm abdominal aneurysm, bilateral iliac involvement   Abdominal aortic aneurysm (AAA) (H)          Discharge Diagnosis:   Same  Postop urinary retention          Procedures:   Procedure(s): Coil Embolization of Left Internal Iliac Artery   Endovascular Repair of Abdominal Aortic Aneurysm   Repair of Type 1A Endoleak with deployment of Endoanchors                Medications Prior to Admission:     Prescriptions Prior to Admission   Medication Sig Dispense Refill Last Dose     fluticasone (FLOVENT HFA) 220 MCG/ACT Inhaler Inhale 1 puff into the lungs daily as needed   Over 1 month ago at prn     albuterol (PROAIR HFA/PROVENTIL HFA/VENTOLIN HFA) 108 (90 BASE) MCG/ACT Inhaler Inhale 1-2 puffs into the lungs every 4 hours as needed for shortness of breath / dyspnea or wheezing   Over 1 week ago at prn     ASPIRIN EC PO Take 81 mg by mouth every morning   9/27/2017 at am     fluticasone-salmeterol (ADVAIR) 250-50 MCG/DOSE diskus inhaler Inhale 1 puff into the lungs every 12 hours as needed   9/28/2017 at 2130     Tetrahydroz-Polyvinyl Al-Povid (CLEAR EYES TRIPLE ACTION) 0.05-0.5-0.6 % SOLN Place 1-2 drops into both eyes 4 times daily as needed   9/27/2017 at prn     Naphazoline-Glycerin-Zinc Sulf (CLEAR EYES MAXIMUM ITCHY EYE) 0.012-0.25-0.25 % SOLN Place 1-2 drops into both eyes 4 times daily as needed (itch relief)   9/28/2017 at am     MetFORMIN HCl (GLUCOPHAGE XR PO) Take 1,000 mg by mouth daily with food In the afternoon with a snack. (patient takes 2 X 500 = 1,000 mg dose)   9/26/2017 at pm     levETIRAcetam (KEPPRA) 250 MG tablet Take 1 tablet (250 mg) by mouth 2 times daily 60 tablet 0 9/29/2017 at 0400      PROCHLORPERAZINE MALEATE PO Take 5-10 mg by mouth every 6 hours as needed for nausea (Chemo)   Over 2 weeks ago at prn     LISINOPRIL PO Take 10 mg by mouth At Bedtime    9/28/2017 at 2130     LORAZEPAM PO Take 0.5-1 mg by mouth every 6 hours as needed for anxiety or nausea    Over 2 weeks ago at prn     ATORVASTATIN CALCIUM PO Take 10 mg by mouth At Bedtime    9/27/2017 at hs     ACYCLOVIR PO Take 400 mg by mouth 2 times daily (preventative-Chemo)   9/28/2017 at 0900     AMLODIPINE BESYLATE PO Take 2.5 mg by mouth At Bedtime    9/27/2017 at hs             Discharge Medications:     Current Discharge Medication List      START taking these medications    Details   oxyCODONE (ROXICODONE) 5 MG IR tablet Take 1 tablet (5 mg) by mouth every 4 hours as needed for moderate to severe pain or pain maximum 8 tablet(s) per day  Qty: 18 tablet, Refills: 0    Associated Diagnoses: Seizure (H)         CONTINUE these medications which have NOT CHANGED    Details   fluticasone (FLOVENT HFA) 220 MCG/ACT Inhaler Inhale 1 puff into the lungs daily as needed      albuterol (PROAIR HFA/PROVENTIL HFA/VENTOLIN HFA) 108 (90 BASE) MCG/ACT Inhaler Inhale 1-2 puffs into the lungs every 4 hours as needed for shortness of breath / dyspnea or wheezing      ASPIRIN EC PO Take 81 mg by mouth every morning      fluticasone-salmeterol (ADVAIR) 250-50 MCG/DOSE diskus inhaler Inhale 1 puff into the lungs every 12 hours as needed      Tetrahydroz-Polyvinyl Al-Povid (CLEAR EYES TRIPLE ACTION) 0.05-0.5-0.6 % SOLN Place 1-2 drops into both eyes 4 times daily as needed      Naphazoline-Glycerin-Zinc Sulf (CLEAR EYES MAXIMUM ITCHY EYE) 0.012-0.25-0.25 % SOLN Place 1-2 drops into both eyes 4 times daily as needed (itch relief)      MetFORMIN HCl (GLUCOPHAGE XR PO) Take 1,000 mg by mouth daily with food In the afternoon with a snack. (patient takes 2 X 500 = 1,000 mg dose)      levETIRAcetam (KEPPRA) 250 MG tablet Take 1 tablet (250 mg) by mouth 2 times  daily  Qty: 60 tablet, Refills: 0    Associated Diagnoses: Seizure (H)      PROCHLORPERAZINE MALEATE PO Take 5-10 mg by mouth every 6 hours as needed for nausea (Chemo)      LISINOPRIL PO Take 10 mg by mouth At Bedtime       LORAZEPAM PO Take 0.5-1 mg by mouth every 6 hours as needed for anxiety or nausea       ATORVASTATIN CALCIUM PO Take 10 mg by mouth At Bedtime       ACYCLOVIR PO Take 400 mg by mouth 2 times daily (preventative-Chemo)      AMLODIPINE BESYLATE PO Take 2.5 mg by mouth At Bedtime                    Consultations:   Consultation during this admission received from Vascular Medicine          Brief History of Illness:           Mr. Hein is an 84-year-old male with a past medical history of  Multiple myeloma, 100-pack-year smoking, Hyperlipidemia and hypertension who presented to [facility] recently with new onset seizures and was found on imaging to have an incidental 7.2cm infrarenal AAA. After discussion of risks and benefits he was consented for endovascular aneurysmal repair.            Hospital Course:   The patient's hospital course was notable only for some postop urinary retention.  He did require replacement of his Beckford catheter for 24 hours and initiation of Flomax.  By postoperative day #2 he was voiding appropriately with post void residuals of less than 200 cc.  He was tolerating his diet with normal bowel function.  His bilateral groin incisions were clear.  He had palpable pedal pulses.  He was discharged to home POD 2.           Discharge Instructions and Follow-Up:   Discharge diet: Regular   Discharge activity: No lifting > 10lbs,  strenuous exercise (pushing or pulling) for 4 week(s)   Discharge follow-up: Follow up with Dr. Leonardo Landaverde in two weeks   Wound care: May get incision wet in shower but do not soak or scrub           Discharge Disposition:   Discharged to home       William Bella MD   Vascular Surgery Fellow   Pager: [phone]

## 2017-09-29 NOTE — PROGRESS NOTES
Doing well post op. AVSS. Denies pain. Bilateral DP/PT pulses palpable. Groin site dressings CDI.

## 2017-09-29 NOTE — PROGRESS NOTES
"Vascular Surgery Post Operative Note     Patient seen and examined at the bedside. Pain is well controlled. No complaints.      /74  Temp 97.3  F (36.3  C) (Axillary)  Resp 16  Ht 5' 10\"  Wt 133 lb  SpO2 100%  BMI 19.08 kg/m2    Intake/Output Summary (Last 24 hours) at 09/29/17 1607  Last data filed at 09/29/17 1334   Gross per 24 hour   Intake             2650 ml   Output             1775 ml   Net              875 ml     General: Elderly  male resting supine NAD   Cor: RRR  Pulm: Unlabored breathing   Abd: S/NT/ND. No pulsatile masses appreciated.   LE: Warm and well perfused bilaterally. Palpable pulses appreciated at the DP/PT bilaterally. Dressings overlying incisions are CDI.  No strike through.     Assessment: Mr. Hein is an 85 yo diabetic male with a history of multiple myeloma, hyperlipidemia, hypertension and seizure activity POD 0 s/p endovascular repair of a 7.1cm AAA    Plan:    1.  Aggressive incentive spirometry  2.  Bedrest today. OOB to chair in AM   3.  SBP goal < 150mmHg   4.  Start home medications   5.  Discontinue Beckford   6.  Plan for discharge tomorrow    William Bella MD   Vascular Surgery Fellow  Pager: [phone]  "

## 2017-09-29 NOTE — IP AVS SNAPSHOT
[facility] [facility]    [address] [place] [address]    Phone:  [phone]                                       After Visit Summary   9/29/2017    Franklyn Hein    MRN: [id number]           After Visit Summary Signature Page     I have received my discharge instructions, and my questions have been answered. I have discussed any challenges I see with this plan with the nurse or doctor.    ..........................................................................................................................................  Patient/Patient Representative Signature      ..........................................................................................................................................  Patient Representative Print Name and Relationship to Patient    ..................................................               ................................................  Date                                            Time    ..........................................................................................................................................  Reviewed by Signature/Title    ...................................................              ..............................................  Date                                                            Time

## 2017-09-29 NOTE — CONSULTS
[facility]    Vascular Medicine Consultation     Date of Admission:  9/29/2017  Date of Consult (When I saw the patient): 09/29/17         Physician Supervisory Attestation:   I have reviewed and discussed with the physician assistant their history, physical and plan and independently interviewed and examined Franklyn Hein and agree with the plan as stated in the physician assistant note.    Franklyn Hein is a 84 year old male former smoker with a history of multiple myeloma, hyperlipidemia, hypertension, and diabetes who was recently evaluated at [facility] after an episode of unconsciousness with urinary incontinence, which ultimately was felt to be a possible seizure. Upon work-up, however, a CT of the chest revealed a 7.2 cm infrarenal AAA. He was then sent for a CT of the abdomen and pelvis, confirming a 7.0 cm AAA. He was ultimately discharged to home with an outpatient appointment with Vascular Surgery. He was seen in the Vascular Clinic by Dr. Landaverde on 9/14/17 and underwent  Embolization of Left IIA with Amplatzer plug and EVAR  Today.  He was seen and evaluated after the procedure today. palapble DP and PT, groin site looks good.  He held metformin 3 days ago. A1c 7.2.    Plan:  Post -op  cares per vascular surgery  monitor groin site  IVF  Monitor renal FX, UOP etc  Insulin SS while in the hospital, restart metformin same dose starting Monday.  Increase atorvastatin to 20 mg daily.  Continue keppra, watch for seizures.    Thank you for the consult !    CC: Dr. Landaverde  Primary MD Tad Alamo MD 9/29/2017          Assessment & Plan   1. Asymptomatic 7.0 cm infrarenal AAA s/p EVAR 9/29/17    Post-operative cares per Dr. Landaverde / Vascular Surgery. He should continue an 81 mg aspirin daily.    2. Hyperlipidemia    His lipid panel is significant for an LDL of 104, HDL 43, triglycerides 126, and total cholesterol 172 while on Lipitor 10 mg daily. He would benefit  from slightly more aggressive control of his lipids, but it is also noted that he is 84 years old. Therefore, it is recommended that he increase his Lipitor to just 20 mg daily moving forward.     3. Hypertension    He will be continued on his home medication regimen and his blood pressure will be monitored closely.     4. Type 2 diabetes    His diabetes has been fairly well controlled as an outpatient to an A1c this admission of 7.2%. His outpatient metformin will be held given the contrast he received during his procedure. His sugars will be monitored and he will be covered with sliding scale insulin as needed while in the hospital. Metformin may be resumed in 48 hours provided his renal function remains stable.     5. Multiple Myeloma    He is followed as an outpatient by Dr. Salcedo of Hematology/Oncology. He is withholding therapy until he recovers form his aneurysm repair. He should follow up with him in a few weeks.     6. Possible seizure disorder    He was recently admitted to the hospital after an episode of unconsciousness and urinary incontinence. After a complete work-up, a seizure could not be ruled out. Therefore, Neurology felt he should continue on Keppra. This will be continued during his hospitalization.       Reason for Consult   Reason for consult: Asked by Dr. Landaverde to evaluate vascular risk factors and assist with medical management in this 84 year old male former smoker with a history of multiple myeloma, hyperlipidemia, hypertension, and diabetes who was incidentally noted to have a 7.0 cm AAA on a recent CT scan.     Primary Care Physician   Melvin Hoskins MD      History of Present Illness   Franklyn Hein is a 84 year old male former smoker with a history of multiple myeloma, hyperlipidemia, hypertension, and diabetes who was recently evaluated at [facility] after an episode of unconsciousness with urinary incontinence, which ultimately was felt to be a possible seizure. Upon  work-up, however, a CT of the chest revealed a 7.2 cm infrarenal AAA. He was then sent for a CT of the abdomen and pelvis, confirming a 7.0 cm AAA. He was ultimately discharged to home with an outpatient appointment with Vascular Surgery. He was seen in the Vascular Clinic by Dr. Landaverde on 9/14/17. It was felt that his aneurysm would be amenable to endovascular repair and this was recommended. He presented for this today.     Past Medical History   Past Medical History:   Diagnosis Date     AAA (abdominal aortic aneurysm) (H)      Anemia     macrocytic     Ataxia      Cancer (H)      COPD (chronic obstructive pulmonary disease) (H)      Diabetes (H)     type 2     Dyspnea      Fatigue      Hypercholesteremia      Hypertension      Multiple myeloma (H)      Nocturia      Pulmonary fibrosis (H)      Tubular adenoma 2007       Past Surgical History   Past Surgical History:   Procedure Laterality Date     APPENDECTOMY         Prior to Admission Medications   Prior to Admission Medications   Prescriptions Last Dose Informant Patient Reported? Taking?   ACYCLOVIR PO 9/28/2017 at 0900 Self Yes Yes   Sig: Take 400 mg by mouth 2 times daily (preventative-Chemo)   AMLODIPINE BESYLATE PO 9/27/2017 at hs Self Yes Yes   Sig: Take 2.5 mg by mouth At Bedtime    ASPIRIN EC PO 9/27/2017 at am Self Yes Yes   Sig: Take 81 mg by mouth every morning   ATORVASTATIN CALCIUM PO 9/27/2017 at hs Self Yes Yes   Sig: Take 10 mg by mouth At Bedtime    LISINOPRIL PO 9/28/2017 at 2130 Self Yes Yes   Sig: Take 10 mg by mouth At Bedtime    LORAZEPAM PO Over 2 weeks ago at prn Self Yes Yes   Sig: Take 0.5-1 mg by mouth every 6 hours as needed for anxiety or nausea    MetFORMIN HCl (GLUCOPHAGE XR PO) 9/26/2017 at pm Self Yes Yes   Sig: Take 1,000 mg by mouth daily with food In the afternoon with a snack. (patient takes 2 X 500 = 1,000 mg dose)   Naphazoline-Glycerin-Zinc Sulf (CLEAR EYES MAXIMUM ITCHY EYE) 0.012-0.25-0.25 % SOLN 9/28/2017 at am Self  Yes Yes   Sig: Place 1-2 drops into both eyes 4 times daily as needed (itch relief)   PROCHLORPERAZINE MALEATE PO Over 2 weeks ago at prn Self Yes Yes   Sig: Take 5-10 mg by mouth every 6 hours as needed for nausea (Chemo)   Tetrahydroz-Polyvinyl Al-Povid (CLEAR EYES TRIPLE ACTION) 0.05-0.5-0.6 % SOLN 9/27/2017 at prn Self Yes Yes   Sig: Place 1-2 drops into both eyes 4 times daily as needed   albuterol (PROAIR HFA/PROVENTIL HFA/VENTOLIN HFA) 108 (90 BASE) MCG/ACT Inhaler Over 1 week ago at prn Self Yes Yes   Sig: Inhale 1-2 puffs into the lungs every 4 hours as needed for shortness of breath / dyspnea or wheezing   fluticasone (FLOVENT HFA) 220 MCG/ACT Inhaler Over 1 month ago at prn Self Yes Yes   Sig: Inhale 1 puff into the lungs daily as needed   fluticasone-salmeterol (ADVAIR) 250-50 MCG/DOSE diskus inhaler 9/28/2017 at 2130 Self Yes Yes   Sig: Inhale 1 puff into the lungs every 12 hours as needed   levETIRAcetam (KEPPRA) 250 MG tablet 9/29/2017 at 0400 Self No Yes   Sig: Take 1 tablet (250 mg) by mouth 2 times daily      Facility-Administered Medications: None     Allergies   No Known Allergies    Social History   Franklyn Hein  reports that he quit smoking about 21 years ago. His smoking use included Cigarettes. He has never used smokeless tobacco. He reports that he uses illicit drugs. He reports that he does not drink alcohol.    Family History   Family History   Problem Relation Age of Onset     DIABETES Mother      Hyperlipidemia Mother      Hypertension Mother      DIABETES Father      DIABETES Brother      DIABETES Sister        Review of Systems   The 10 point Review of Systems is negative other than noted in the HPI or here.     Physical Exam   Temp: 98.7  F (37.1  C) Temp src: Temporal BP: 111/62   Heart Rate: 88 Resp: 9 SpO2: 100 % O2 Device: Nasal cannula Oxygen Delivery: 3 LPM  Vital Signs with Ranges  Temp:  [96.8  F (36  C)-98.7  F (37.1  C)] 98.7  F (37.1  C)  Heart Rate:  [88-89]  88  Resp:  [9-18] 9  BP: (111-133)/(62-82) 111/62  MAP:  [69 mmHg-87 mmHg] 69 mmHg  Arterial Line BP: (105-116)/(52-72) 105/52  SpO2:  [100 %] 100 %  133 lbs 0 oz    Constitutional: awake, alert, cooperative, no apparent distress, and appears stated age  Eyes: Lids and lashes normal, pupils equal, round and reactive to light, extra ocular muscles intact, sclera clear, conjunctiva normal  ENT: normocepalic, without obvious abnormality, oropharynx pink and moist  Hematologic / Lymphatic: no lymphadenopathy  Respiratory: No increased work of breathing, good air exchange, clear to auscultation bilaterally, no crackles or wheezing  Cardiovascular: regular rate and rhythm, normal S1 and S2 and no murmur noted  GI: Normal bowel sounds, soft, non-distended, non-tender  Skin: no redness, warmth, or swelling, no rashes and no lesions  Musculoskeletal: There is no redness, warmth, or swelling of the joints.   Neurologic: Awake, alert, oriented to name, place and time.  Cranial nerves II-XII are grossly intact.  Motor is 5 out of 5 bilaterally.    Neuropsychiatric:  Normal affect, memory, insight.  Pulses: Palpable DP/PT pulses bilaterally. No carotid bruits appreciated.     Data   Most Recent 3 CBC's:  Recent Labs   Lab Test  09/29/17   0620 09/12/17   0538  09/10/17   1932   WBC  4.8  6.8  7.5   HGB  11.9*  9.2*  10.9*   MCV  93  96  96   PLT  158  68*  118*     Most Recent 3 BMP's:  Recent Labs   Lab Test  09/29/17   0620  09/12/17   0538  09/11/17   0610  09/10/17   1932   NA   --   134  134  128*   POTASSIUM  4.0  3.9  4.3  4.4   CHLORIDE   --   105  104  95   CO2   --   23  23  23   BUN   --   17  31*  42*   CR  1.06  0.93  1.12  1.31*   ANIONGAP   --   6  7  10   HAO   --   7.2*  7.4*  8.3*   GLC  120*  90  170*  225*     Most Recent Cholesterol Panel:  Recent Labs   Lab Test  09/29/17   0620   CHOL  172   LDL  104*   HDL  43   TRIG  126     Most Recent Hemoglobin A1c:  Recent Labs   Lab Test  09/29/17   0620   A1C   7.2*

## 2017-09-29 NOTE — OP NOTE
DATE OF PROCEDURE:  09/29/2017      PREOPERATIVE DIAGNOSIS:  7.2 cm abdominal aortic aneurysm with bilateral iliac involvement.      POSTOPERATIVE DIAGNOSIS:  7.2 centimeter abdominal aortic aneurysm with bilateral iliac involvement.      PROCEDURE:   1.  Exposure of bilateral common femoral arteries.   2.  Repair of bilateral common femoral arteries.       SURGEON:  Juanito Landaverde MD      :   NELIDA ALVAREZ MD   ASSISTANT:   AUGUSTUS CARRANZA MD, [facility] surgery resident      ANESTHESIA:  General endotracheal anesthesia.      ESTIMATED BLOOD LOSS:  100 mL.      OPERATIVE INDICATIONS:  Franklyn Hein is a frail 84-year-old gentleman with a greater than 100-pack-year smoking history who presented to [facility] recently with new onset seizures.  Workup at that time included a CT scan of the abdomen and pelvis demonstrating a previously undiagnosed 7.2 cm infrarenal abdominal aortic aneurysm with bilateral common iliac involvement.  Anatomically, this is amenable to endovascular repair and that is our plan for today.      OPERATIVE FINDINGS:  The bilateral common femoral arteries were soft and relatively disease-free.  Following successful endovascular aneurysm repair and subsequent repair of the femoral arteries, this patient does have palpable pedal pulses bilaterally as was his preoperative baseline.      DESCRIPTION OF PROCEDURE:  The patient was brought to the endovascular suite and placed on the table in a supine position after which general endotracheal anesthesia was achieved without incident.  His abdomen and bilateral thighs were prepped and draped in the usual sterile fashion.  We began with an oblique incision centered over the right common femoral artery.  Dissection proceeded sharply downward to isolate this vessel at the level of the inguinal ligament.  It was dissected free for 3 cm and proximal and distal control was achieved with vessel loops.  Next, an oblique  incision was made centered over the left common femoral artery.  Again, dissection proceeded sharply downward to isolate this vessel at the level of the inguinal ligament.  It was dissected free for 3 cm and proximal and distal control was achieved with vessel loops.  At this point, I was joined by my interventional radiologic colleagues and we proceeded with an uneventful embolization of the left hypogastric followed by EVAR endovascular aortic aneurysm repair utilizing a [employer] bifurcated device.  Following this, attention was first directed towards repair of the left common femoral artery.  The 12 Canadian sheath was removed as proximal and distal control was achieved on the common femoral artery.  The arteriotomy was closed transversely with running 5-0 Prolene suture.  Prior to securing the suture line, all clamps were briefly released to flush any debris out of the arterial lumen.  That repair was subsequently closed and found to be hemostatic.  Flow was restored down the left leg.  Next, proximal and distal control was achieved on the right common femoral artery as the 16 Canadian sheath was removed.  The arteriotomy was closed transversely with running 5-0 Prolene suture.  Prior to completing this suture line, all clamps were briefly released to flush any debris out of the arterial lumen.  This anastomosis was subsequently secured and observed to be hemostatic.  This flow was restored down the right leg.  Surgicel gauze and gentle compression was held over both groins for several minutes.  Both wounds were infiltrated with a total of 30 mL of 0.25% Marcaine with epinephrine.  Both wounds were then closed in layers using interrupted absorbable suture.  Skin for both wounds was closed with 4-0 Monocryl running subcuticular sutures.  Dermabond and sterile dressings were applied.  Final sponge and needle count were reported as correct.  The patient was noted to have easily palpable pedal pulses bilaterally as was  his preoperative baseline.  He was returned extubated and hemodynamically stable to the recovery room.         JUANITO LANDAVERDE MD             D: 2017 13:03   T: 2017 15:43   MT: [id number]      Name:     ALEJANDRA TALBOT   MRN:      [id number]        Account:        [id number]   :      10/28/1932           Procedure Date: 2017      Document: [id number]       cc: Juanito Landaverde MD

## 2017-09-30 LAB
ANION GAP SERPL CALCULATED.3IONS-SCNC: 7 MMOL/L (ref 3–14)
BLD PROD TYP BPU: NORMAL
BLD PROD TYP BPU: NORMAL
BLD UNIT ID BPU: 0
BLD UNIT ID BPU: 0
BLOOD PRODUCT CODE: NORMAL
BLOOD PRODUCT CODE: NORMAL
BPU ID: NORMAL
BPU ID: NORMAL
BUN SERPL-MCNC: 14 MG/DL (ref 7–30)
CALCIUM SERPL-MCNC: 7.4 MG/DL (ref 8.5–10.1)
CHLORIDE SERPL-SCNC: 105 MMOL/L (ref 94–109)
CO2 SERPL-SCNC: 23 MMOL/L (ref 20–32)
CREAT SERPL-MCNC: 0.98 MG/DL (ref 0.66–1.25)
ERYTHROCYTE [DISTWIDTH] IN BLOOD BY AUTOMATED COUNT: 16 % (ref 10–15)
GFR SERPL CREATININE-BSD FRML MDRD: 72 ML/MIN/1.7M2
GLUCOSE BLDC GLUCOMTR-MCNC: 128 MG/DL (ref 70–99)
GLUCOSE BLDC GLUCOMTR-MCNC: 129 MG/DL (ref 70–99)
GLUCOSE BLDC GLUCOMTR-MCNC: 141 MG/DL (ref 70–99)
GLUCOSE BLDC GLUCOMTR-MCNC: 189 MG/DL (ref 70–99)
GLUCOSE SERPL-MCNC: 124 MG/DL (ref 70–99)
HCT VFR BLD AUTO: 24.1 % (ref 40–53)
HGB BLD-MCNC: 8 G/DL (ref 13.3–17.7)
MCH RBC QN AUTO: 30.9 PG (ref 26.5–33)
MCHC RBC AUTO-ENTMCNC: 33.2 G/DL (ref 31.5–36.5)
MCV RBC AUTO: 93 FL (ref 78–100)
PLATELET # BLD AUTO: 130 10E9/L (ref 150–450)
POTASSIUM SERPL-SCNC: 4 MMOL/L (ref 3.4–5.3)
RBC # BLD AUTO: 2.59 10E12/L (ref 4.4–5.9)
SODIUM SERPL-SCNC: 135 MMOL/L (ref 133–144)
TRANSFUSION STATUS PATIENT QL: NORMAL
WBC # BLD AUTO: 5.8 10E9/L (ref 4–11)

## 2017-09-30 PROCEDURE — 25000132 ZZH RX MED GY IP 250 OP 250 PS 637: Mod: GY | Performed by: SURGERY

## 2017-09-30 PROCEDURE — A9270 NON-COVERED ITEM OR SERVICE: HCPCS | Mod: GY | Performed by: SURGERY

## 2017-09-30 PROCEDURE — 85027 COMPLETE CBC AUTOMATED: CPT | Performed by: SURGERY

## 2017-09-30 PROCEDURE — 99207 ZZC MOONLIGHTING INDICATOR: CPT | Performed by: INTERNAL MEDICINE

## 2017-09-30 PROCEDURE — 25000132 ZZH RX MED GY IP 250 OP 250 PS 637: Mod: GY | Performed by: PHYSICIAN ASSISTANT

## 2017-09-30 PROCEDURE — 99232 SBSQ HOSP IP/OBS MODERATE 35: CPT | Performed by: INTERNAL MEDICINE

## 2017-09-30 PROCEDURE — A9270 NON-COVERED ITEM OR SERVICE: HCPCS | Mod: GY | Performed by: PHYSICIAN ASSISTANT

## 2017-09-30 PROCEDURE — 25000128 H RX IP 250 OP 636: Performed by: SURGERY

## 2017-09-30 PROCEDURE — 00000146 ZZHCL STATISTIC GLUCOSE BY METER IP

## 2017-09-30 PROCEDURE — 36415 COLL VENOUS BLD VENIPUNCTURE: CPT | Performed by: SURGERY

## 2017-09-30 PROCEDURE — 80048 BASIC METABOLIC PNL TOTAL CA: CPT | Performed by: SURGERY

## 2017-09-30 PROCEDURE — 25000125 ZZHC RX 250

## 2017-09-30 PROCEDURE — 12000007 ZZH R&B INTERMEDIATE

## 2017-09-30 RX ORDER — TAMSULOSIN HYDROCHLORIDE 0.4 MG/1
0.4 CAPSULE ORAL DAILY
Status: DISCONTINUED | OUTPATIENT
Start: 2017-09-30 | End: 2017-10-01 | Stop reason: HOSPADM

## 2017-09-30 RX ADMIN — ACETAMINOPHEN 975 MG: 325 TABLET, FILM COATED ORAL at 08:39

## 2017-09-30 RX ADMIN — ACYCLOVIR 400 MG: 200 CAPSULE ORAL at 08:39

## 2017-09-30 RX ADMIN — SENNOSIDES AND DOCUSATE SODIUM 1 TABLET: 8.6; 5 TABLET ORAL at 08:39

## 2017-09-30 RX ADMIN — LEVETIRACETAM 250 MG: 250 TABLET, FILM COATED ORAL at 21:04

## 2017-09-30 RX ADMIN — LIDOCAINE HYDROCHLORIDE: 20 JELLY TOPICAL at 12:45

## 2017-09-30 RX ADMIN — HEPARIN SODIUM 5000 UNITS: 5000 INJECTION, SOLUTION INTRAVENOUS; SUBCUTANEOUS at 08:45

## 2017-09-30 RX ADMIN — SENNOSIDES AND DOCUSATE SODIUM 1 TABLET: 8.6; 5 TABLET ORAL at 21:04

## 2017-09-30 RX ADMIN — HEPARIN SODIUM 5000 UNITS: 5000 INJECTION, SOLUTION INTRAVENOUS; SUBCUTANEOUS at 17:09

## 2017-09-30 RX ADMIN — ACETAMINOPHEN 975 MG: 325 TABLET, FILM COATED ORAL at 17:09

## 2017-09-30 RX ADMIN — LEVETIRACETAM 250 MG: 250 TABLET, FILM COATED ORAL at 08:41

## 2017-09-30 RX ADMIN — CEFAZOLIN SODIUM 1 G: 1 INJECTION, POWDER, FOR SOLUTION INTRAMUSCULAR; INTRAVENOUS at 01:25

## 2017-09-30 RX ADMIN — ACETAMINOPHEN 975 MG: 325 TABLET, FILM COATED ORAL at 23:59

## 2017-09-30 RX ADMIN — ATORVASTATIN CALCIUM 20 MG: 10 TABLET, FILM COATED ORAL at 21:03

## 2017-09-30 RX ADMIN — INSULIN ASPART 1 UNITS: 100 INJECTION, SOLUTION INTRAVENOUS; SUBCUTANEOUS at 18:21

## 2017-09-30 RX ADMIN — TAMSULOSIN HYDROCHLORIDE 0.4 MG: 0.4 CAPSULE ORAL at 14:18

## 2017-09-30 RX ADMIN — ACETAMINOPHEN 975 MG: 325 TABLET, FILM COATED ORAL at 01:25

## 2017-09-30 RX ADMIN — HEPARIN SODIUM 5000 UNITS: 5000 INJECTION, SOLUTION INTRAVENOUS; SUBCUTANEOUS at 23:58

## 2017-09-30 RX ADMIN — SODIUM CHLORIDE: 9 INJECTION, SOLUTION INTRAVENOUS at 04:43

## 2017-09-30 RX ADMIN — ASPIRIN 325 MG ORAL TABLET 325 MG: 325 PILL ORAL at 08:39

## 2017-09-30 RX ADMIN — LISINOPRIL 10 MG: 10 TABLET ORAL at 21:04

## 2017-09-30 RX ADMIN — ACYCLOVIR 400 MG: 200 CAPSULE ORAL at 21:04

## 2017-09-30 NOTE — PLAN OF CARE
Problem: Patient Care Overview  Goal: Plan of Care/Patient Progress Review  Outcome: No Change  Urinary retention. Vascular surgery fellow aware. Garcia placed. Started on flomax. Will dc garcia in am and reassess voiding. Discharge tomorrow. Pulses palpable. Minimal pain. Groin incisions intact. Neuros intact.

## 2017-09-30 NOTE — PROGRESS NOTES
Straight cath this am x1 for 850cc due to urinary retention, tolerating po intake, plans to ambulate this am.  Pain controlled.    AVSS alert oriented no acute distress  Bilateral groin incisions clean dry and intact  Mild to moderate LE edema bilaterally  Palp DP bilaterally    Hgb 8.0, WBC 5.8, Plt 130  Cr. 0.98    UOP: 3.5 in, 2.4L out    Assessment:  83 yo male POD#1 s/p EVAR with endoanchors and coiling of left hypogastric artery    Plan:  Continue to monitor urinary retention closely, will place garcia if retention or PVR >300cc  IVF heparin locked, po as tolerated  Ambulate as able  If patient can ambulate safely and voids independently possible discharge later today.  Patient and wife agreeable and all questions answered.    Sammi Pizano MD  Vascular Surgery Fellow  Pager: [phone]

## 2017-09-30 NOTE — PLAN OF CARE
A/Ox4. VSS. Groin incisions with drsg CDI. Pulses palpable. [place], has hearing aid. Denies pain. Tolerates reg diet well. Beckford d/'cd, due to void.

## 2017-09-30 NOTE — PROGRESS NOTES
"[facility] Internal Medicine Progress Note     Date of Service (when I saw the patient): 09/30/2017      REASON FOR ADMISSION / INTERVAL HISTORY: Franklyn Hein is a 84 year old male former smoker with a history of multiple myeloma, hyperlipidemia, hypertension, and diabetes who was recently evaluated at [facility] after an episode of unconsciousness with urinary incontinence, which ultimately was felt to be a possible seizure. Upon work-up, however, a CT of the chest revealed a 7.2 cm infrarenal AAA. He was then sent for a CT of the abdomen and pelvis, confirming a 7.0 cm AAA. He was ultimately discharged to home with an outpatient appointment with Vascular Surgery. He was seen in the Vascular Clinic by Dr. Landaverde on 9/14/17 and underwent  Embolization of Left IIA with Amplatzer plug and EVAR  9/29. Feels good .   Wants to go home. See details below.     ASSESSMENT/PLAN:   AAA  S/p Embolization of Left IIA with Amplatzer plug and EVAR  9/29. Post op care per surgery.    HTN  Continue meds    HLD  Continue meds    DM2  Resume metformin on Monday. BS stable.    URINARY RETENTION  Voided 25mL at 0700, c/o discomfort, bladder scanned for 900mL.  Straight cath for 850mL. Catheter left in--flomax started. To try voiding trial again in AM per surgery.    DISPO   per surgery.      ANALI HENRY MD   Pg [phone]    DVT Prhylaxis: Ambulate every shift  Code Status: Prior    ROS:  As described in A/P and Exam.  Otherwise ALL are  negative.    PHYSICAL EXAM:  All vitals have been reviewed    Blood pressure 106/48, pulse 86, temperature 97.6  F (36.4  C), temperature source Oral, resp. rate 16, height 1.778 m (5' 10\"), weight 61.5 kg (135 lb 9.3 oz), SpO2 98 %.    I/O this shift:  In: -   Out: 950 [Urine:950]    GENERAL APPEARANCE: healthy, alert and no distress  EYES: conjunctiva clear, eyes grossly normal  HENT: external ears and nose normal   NECK: supple, no masses or adenopathy  RESP: lungs clear to " auscultation - no rales, rhonchi or wheezes  CV: regular rate and rhythm, normal S1 S2, no S3 or S4 and no murmur, click or rub   ABDOMEN: soft, nontender, no HSM or masses and bowel sounds normal  MS: no clubbing, cyanosis; no edema  SKIN: clear without significant rashes or lesions  NEURO: -non-focal moves all 4 extr    ROUTINE  LABS (Last four results)  CMP  Recent Labs  Lab 09/30/17  0850 09/29/17  1048 09/29/17  0620    133  --    POTASSIUM 4.0 3.9 4.0   CHLORIDE 105  --   --    CO2 23  --   --    ANIONGAP 7  --   --    *  --  120*   BUN 14  --   --    CR 0.98  --  1.06   GFRESTIMATED 72  --  66   GFRESTBLACK 87  --  80   HAO 7.4*  --   --      CBC  Recent Labs  Lab 09/30/17  0850 09/29/17  1048 09/29/17  0620   WBC 5.8  --  4.8   RBC 2.59*  --  3.85*   HGB 8.0* 8.8* 11.9*   HCT 24.1*  --  35.7*   MCV 93  --  93   MCH 30.9  --  30.9   MCHC 33.2  --  33.3   RDW 16.0*  --  15.8*   *  --  158     INRNo lab results found in last 7 days.  Arterial Blood Gas  Recent Labs  Lab 09/29/17  1048   PH 7.36   PCO2 40   PO2 262*   HCO3 23       No results found for this or any previous visit (from the past 24 hour(s)).

## 2017-09-30 NOTE — PLAN OF CARE
Problem: Patient Care Overview  Goal: Plan of Care/Patient Progress Review  Outcome: Improving  A&OX4, [place].  Voided 125mL using urinal X1 this shift. IV fluids infusing. Bilateral groin dressings C/D/I. Denies pain.  BS hypoactive, has not passed gas.  VSS.  Voided 25mL at 0700, c/o discomfort, bladder scanned for 900mL.  Straight cath for 850mL, patient is due to void. Possible d/c today.

## 2017-10-01 VITALS
DIASTOLIC BLOOD PRESSURE: 57 MMHG | OXYGEN SATURATION: 97 % | BODY MASS INDEX: 19.51 KG/M2 | RESPIRATION RATE: 16 BRPM | SYSTOLIC BLOOD PRESSURE: 129 MMHG | HEIGHT: 70 IN | TEMPERATURE: 97.5 F | HEART RATE: 83 BPM | WEIGHT: 136.24 LBS

## 2017-10-01 LAB
GLUCOSE BLDC GLUCOMTR-MCNC: 116 MG/DL (ref 70–99)
GLUCOSE BLDC GLUCOMTR-MCNC: 131 MG/DL (ref 70–99)
GLUCOSE BLDC GLUCOMTR-MCNC: 206 MG/DL (ref 70–99)

## 2017-10-01 PROCEDURE — 25000132 ZZH RX MED GY IP 250 OP 250 PS 637: Mod: GY | Performed by: SURGERY

## 2017-10-01 PROCEDURE — 25000132 ZZH RX MED GY IP 250 OP 250 PS 637: Mod: GY | Performed by: INTERNAL MEDICINE

## 2017-10-01 PROCEDURE — 99207 ZZC MOONLIGHTING INDICATOR: CPT | Performed by: INTERNAL MEDICINE

## 2017-10-01 PROCEDURE — 00000146 ZZHCL STATISTIC GLUCOSE BY METER IP

## 2017-10-01 PROCEDURE — 25000128 H RX IP 250 OP 636: Performed by: SURGERY

## 2017-10-01 PROCEDURE — A9270 NON-COVERED ITEM OR SERVICE: HCPCS | Mod: GY | Performed by: SURGERY

## 2017-10-01 PROCEDURE — A9270 NON-COVERED ITEM OR SERVICE: HCPCS | Mod: GY | Performed by: INTERNAL MEDICINE

## 2017-10-01 PROCEDURE — 99232 SBSQ HOSP IP/OBS MODERATE 35: CPT | Performed by: INTERNAL MEDICINE

## 2017-10-01 RX ORDER — POLYETHYLENE GLYCOL 3350 17 G/17G
17 POWDER, FOR SOLUTION ORAL DAILY
Status: DISCONTINUED | OUTPATIENT
Start: 2017-10-01 | End: 2017-10-01 | Stop reason: HOSPADM

## 2017-10-01 RX ORDER — POLYETHYLENE GLYCOL 3350 17 G/17G
17 POWDER, FOR SOLUTION ORAL DAILY
Status: DISCONTINUED | OUTPATIENT
Start: 2017-10-01 | End: 2017-10-01

## 2017-10-01 RX ORDER — MAGNESIUM CARB/ALUMINUM HYDROX 105-160MG
296 TABLET,CHEWABLE ORAL ONCE
Status: DISCONTINUED | OUTPATIENT
Start: 2017-10-01 | End: 2017-10-01

## 2017-10-01 RX ORDER — MAGNESIUM CARB/ALUMINUM HYDROX 105-160MG
296 TABLET,CHEWABLE ORAL ONCE
Status: DISCONTINUED | OUTPATIENT
Start: 2017-10-01 | End: 2017-10-01 | Stop reason: HOSPADM

## 2017-10-01 RX ADMIN — OXYCODONE HYDROCHLORIDE 5 MG: 5 TABLET ORAL at 12:18

## 2017-10-01 RX ADMIN — SENNOSIDES AND DOCUSATE SODIUM 2 TABLET: 8.6; 5 TABLET ORAL at 09:31

## 2017-10-01 RX ADMIN — ASPIRIN 325 MG ORAL TABLET 325 MG: 325 PILL ORAL at 09:31

## 2017-10-01 RX ADMIN — LEVETIRACETAM 250 MG: 250 TABLET, FILM COATED ORAL at 09:31

## 2017-10-01 RX ADMIN — INSULIN ASPART 2 UNITS: 100 INJECTION, SOLUTION INTRAVENOUS; SUBCUTANEOUS at 14:30

## 2017-10-01 RX ADMIN — OXYCODONE HYDROCHLORIDE 5 MG: 5 TABLET ORAL at 09:31

## 2017-10-01 RX ADMIN — OXYCODONE HYDROCHLORIDE 5 MG: 5 TABLET ORAL at 17:39

## 2017-10-01 RX ADMIN — OXYCODONE HYDROCHLORIDE 5 MG: 5 TABLET ORAL at 15:31

## 2017-10-01 RX ADMIN — ACETAMINOPHEN 975 MG: 325 TABLET, FILM COATED ORAL at 09:30

## 2017-10-01 RX ADMIN — ACYCLOVIR 400 MG: 200 CAPSULE ORAL at 09:30

## 2017-10-01 RX ADMIN — Medication 1 LOZENGE: at 06:22

## 2017-10-01 RX ADMIN — HEPARIN SODIUM 5000 UNITS: 5000 INJECTION, SOLUTION INTRAVENOUS; SUBCUTANEOUS at 09:30

## 2017-10-01 RX ADMIN — POLYETHYLENE GLYCOL 3350 17 G: 17 POWDER, FOR SOLUTION ORAL at 11:15

## 2017-10-01 RX ADMIN — ACETAMINOPHEN 975 MG: 325 TABLET, FILM COATED ORAL at 15:31

## 2017-10-01 RX ADMIN — HEPARIN SODIUM 5000 UNITS: 5000 INJECTION, SOLUTION INTRAVENOUS; SUBCUTANEOUS at 15:31

## 2017-10-01 RX ADMIN — TAMSULOSIN HYDROCHLORIDE 0.4 MG: 0.4 CAPSULE ORAL at 09:31

## 2017-10-01 NOTE — PROGRESS NOTES
Beckford placed yesterday for PVR >800cc, otherwise feeling well, ambulated multiple times yesterday.  Appropriate soreness bilateral groin, tolerating po intake.  Mild shortness of breath on exertion patient notes is baseline.    B/P: 130/61, T: 98.2, P: 86, R: 16  Alert oriented no acute distress  Bilateral groin incisions clean dry intact, no erythema or drainage, mild swelling  Palpable femoral and DP bilaterally, moderate LE swelling bilaterally, improving from yesterday    Assessment:  85 yo male POD#2 s/p EVAR with endoanchors and plug left hypogastric artery    Plan:  Pending independent void will d/c to home today  If persistent urinary retention will consult urology  Ambulate, IS  Pain management  Follow up 1-2 weeks vascular surgery for incision check/post op care.    Sammi Pizano MD   Vascular Surgery Fellow  Pager [phone]

## 2017-10-01 NOTE — DISCHARGE INSTRUCTIONS
Endovascular Abdominal Aortic Aneurysm Repair  Post-Procedure Instructions    Incision Care    You will have an incision in one or both groins.  There will be white strips of tape, called steri-strips, applied over the incisions.  The ends will loosen after 5-7 days, at which time they can be removed.      Cover your incisions with dry gauze and change as needed.      You may shower 2-3 days after your surgery - pat the incision dry to prevent irritation from moisture.        You may develop bruising and firmness near your incision.  This will soften and resolve over the next 1-3 weeks.  Call our office if it enlarges, becomes red, or painful.      On occasion a soft, fluid-filled bulge can develop near a surgical incision - this is called a seroma.  It will likely resolve on its own, but occasionally requires your doctor to drain it in clinic.  You should call our office if you have questions about this.      You may notice numbness around your incision.  This is due to nerve irritation from the surgery and will gradually improve over the next several weeks.    Activity    Walking is important after surgery.  You will begin walking before you leave the hospital, and then you should gradually increase your distance as tolerated.  You should be taking at least 3-4 short walks a day.      You may climb stairs when you feel strong enough.      You should not drive for 1-2 weeks or while taking prescription strength pain medication.   You must feel strong enough to drive safely.      You may return to work once you feel ready - this can be decided at your 2 week post-operative visit.      You should avoid strenuous activity, including running, biking, or weight-lifting until discussed at your post-operative appointment.    Diet    You may resume a regular diet before you leave the hospital.  You may find that it is best to try smaller, more frequent meals until your appetite returns.        Medications    Resume all  previous medications as prior to surgery, unless otherwise instructed.      See further information regarding medications containing Metformin/ NA _______    Special Instructions    Carry your stent graft card with you at all times; there may be restrictions pertaining to MRI testing for one month from the date of your procedure.      If you will be having an invasive procedure, such as a colonoscopy or dental work, within one year of your surgery, you will need to take an antibiotic prior to the procedure.  Please call us so we can assist you with this.    Follow-Up Appointments    You will need to see your surgeon 10-14 days after your procedure.  o Please call [phone] to schedule this appointment.  o Date: _________________________________  o Time: ________________________________  o Dr. ___________________________________  o Location: [facility], [address] [place] [address], [address]      You will also see either your surgeon or your Interventional Radiologist 1 month after your procedure and annually thereafter.  We will arrange for you to have a CT angiogram prior to these appointments and will notify you by mail when you are due to schedule.    When to Call our Office      Temperature greater than 101.      If you notice new onset of numbness, tingling, coolness or pain in your leg.      Severe pain, especially if it is new and preventing sleep.    Call our main number, [phone], for assistance with any concerns or questions.  For Interventional Radiology Nurse, call [phone].    Revised 2014      **Follow-up with your primary doctor this week to address urinary retention**

## 2017-10-01 NOTE — PLAN OF CARE
"Problem: Patient Care Overview  Goal: Plan of Care/Patient Progress Review  Outcome: Improving  VSS, denies pain and SOB while at rest, but c/o URIAS. LS diminished posterior, PCDs on, wound on top of left foot that pt states is d/t \" a too tight slipper\" and is painful when touched. Bed alarm is on, and calls approp. Started on flomax for rentention and garcia placed this afternoon. Garcia is patent. Plan is to remove garcia at 0600 and start void trial and if able to void on own, d/c home. Continue to monitor      "

## 2017-10-01 NOTE — PROGRESS NOTES
"[facility] Internal Medicine Progress Note     Date of Service (when I saw the patient): 10/01/2017      REASON FOR ADMISSION / INTERVAL HISTORY: Franklyn Hein is a 84 year old male former smoker with a history of multiple myeloma, hyperlipidemia, hypertension, and diabetes who was recently evaluated at [facility] after an episode of unconsciousness with urinary incontinence, which ultimately was felt to be a possible seizure. Upon work-up, however, a CT of the chest revealed a 7.2 cm infrarenal AAA. He was then sent for a CT of the abdomen and pelvis, confirming a 7.0 cm AAA. He was ultimately discharged to home with an outpatient appointment with Vascular Surgery. He was seen in the Vascular Clinic by Dr. Landaverde on 9/14/17 and underwent  Embolization of Left IIA with Amplatzer plug and EVAR  9/29. Feels good .   Wants to go home. See details below.     ASSESSMENT/PLAN:   AAA  S/p Embolization of Left IIA with Amplatzer plug and EVAR  9/29. Post op care per surgery.    HTN  Continue meds    HLD  Continue meds    DM2  Resume metformin on Monday. BS stable.    URINARY RETENTION  On 9/30 patient Voided 25mL at 0700, c/o discomfort, bladder scanned for 900mL.  Straight cath for 850mL. Catheter left in--flomax started. To try voiding trial again today per surgery. Per surgery,  If unable to void, would get urology involved.    CONSTIPATION  Will give magcitrate and start daily miralax    DISPO   per surgery.      ANALI HENRY MD   Pg [phone]    DVT Prhylaxis: Ambulate every shift  Code Status: Prior    ROS:  As described in A/P and Exam.  Otherwise ALL are  negative.    PHYSICAL EXAM:  All vitals have been reviewed    Blood pressure (!) 81/36, pulse 86, temperature 97.5  F (36.4  C), temperature source Oral, resp. rate 16, height 1.778 m (5' 10\"), weight 61.8 kg (136 lb 3.9 oz), SpO2 98 %.    I/O this shift:  In: -   Out: 1525 [Urine:1525]    GENERAL APPEARANCE: healthy, alert and no distress  EYES: " conjunctiva clear, eyes grossly normal  HENT: external ears and nose normal   NECK: supple, no masses or adenopathy  RESP: lungs clear to auscultation - no rales, rhonchi or wheezes  CV: regular rate and rhythm, normal S1 S2, no S3 or S4 and no murmur, click or rub   ABDOMEN: soft, nontender, no HSM or masses and bowel sounds normal  MS: no clubbing, cyanosis; no edema  SKIN: clear without significant rashes or lesions  NEURO: -non-focal moves all 4 extr    ROUTINE  LABS (Last four results)  CMP    Recent Labs  Lab 09/30/17  0850 09/29/17  1048 09/29/17  0620    133  --    POTASSIUM 4.0 3.9 4.0   CHLORIDE 105  --   --    CO2 23  --   --    ANIONGAP 7  --   --    *  --  120*   BUN 14  --   --    CR 0.98  --  1.06   GFRESTIMATED 72  --  66   GFRESTBLACK 87  --  80   HAO 7.4*  --   --      CBC    Recent Labs  Lab 09/30/17  0850 09/29/17  1048 09/29/17  0620   WBC 5.8  --  4.8   RBC 2.59*  --  3.85*   HGB 8.0* 8.8* 11.9*   HCT 24.1*  --  35.7*   MCV 93  --  93   MCH 30.9  --  30.9   MCHC 33.2  --  33.3   RDW 16.0*  --  15.8*   *  --  158     INRNo lab results found in last 7 days.  Arterial Blood Gas    Recent Labs  Lab 09/29/17  1048   PH 7.36   PCO2 40   PO2 262*   HCO3 23       No results found for this or any previous visit (from the past 24 hour(s)).

## 2017-10-01 NOTE — PLAN OF CARE
Problem: Patient Care Overview  Goal: Plan of Care/Patient Progress Review  Outcome: Improving  Pt is [place], VSS on RA. Bilateral groin incisions with liquid bandage are JOEL. Pulses heard with doppler. Pt denied pain till this morning when he complained of pain 2/10, his PRN pain med was offered but he declined it. [place] catheter patent with AUO, removed this morning . Pt is for voiding trial, to be discharged today if no urine retention.

## 2017-10-01 NOTE — PROVIDER NOTIFICATION
Dr. Garza notified of voided amts and PVRs.  MD deferred to surgical services.  Dr. LeJeune notified and new orders obtained.  If next PVR <300cc, pt will d/c to home on Flomax and F/U with PMD this week.  If >300cc, indwelling cath will be replaced with urology consult in AM.

## 2017-10-02 ENCOUNTER — TELEPHONE (OUTPATIENT)
Dept: OTHER | Facility: CLINIC | Age: 82
End: 2017-10-02

## 2017-10-02 DIAGNOSIS — R56.9 SEIZURE (H): ICD-10-CM

## 2017-10-02 RX ORDER — OXYCODONE HYDROCHLORIDE 5 MG/1
5 TABLET ORAL EVERY 6 HOURS PRN
Qty: 12 TABLET | Refills: 0 | Status: SHIPPED | OUTPATIENT
Start: 2017-10-02 | End: 2017-11-16

## 2017-10-02 NOTE — PROGRESS NOTES
"Hypotensive episode x1 when up in chair, 81/36, but BP stable t/o rest of shift.  See I/O flowsheet for voided amts and PVR.  MD's updated t/o shift with UOP/PVR.  Proceed with discharge per Dr. LeJeune.  At time of discharge, PVR<200, and improving with subsequent voids.  Oxycodone 5mg for pain.  C/O nerve pain to left lower back/buttocks area.  Patient declining need for Oxycodone at home; instructed to call Dr. Landaverde's office in AM if Rx for pain meds.  Medicated with Oxycodone 5mg prior to discharge.  Patient planning on taking Tylenol at home for pain.      Discharge instructions provided to patient and family.  Order for Flomax called into patient's [facility] pharmacy in [place].  Patient instructed to follow-up with PMD this week to address urinary retention.  Verbalized understanding of post-op cares/Rx/follow-up.  Rates pain \"1\" at time of discharge.  D/C to home with family@1830.  "

## 2017-10-02 NOTE — TELEPHONE ENCOUNTER
Left VM for pt requesting to call to make a post op f/u appt with Dr. Akhil Hendricks, RN, BSN

## 2017-10-06 ENCOUNTER — TRANSFERRED RECORDS (OUTPATIENT)
Dept: HEALTH INFORMATION MANAGEMENT | Facility: CLINIC | Age: 82
End: 2017-10-06

## 2017-10-12 ENCOUNTER — OFFICE VISIT (OUTPATIENT)
Dept: SURGERY | Facility: CLINIC | Age: 82
End: 2017-10-12
Payer: COMMERCIAL

## 2017-10-12 VITALS
SYSTOLIC BLOOD PRESSURE: 126 MMHG | HEART RATE: 87 BPM | DIASTOLIC BLOOD PRESSURE: 60 MMHG | OXYGEN SATURATION: 98 % | BODY MASS INDEX: 19.47 KG/M2 | HEIGHT: 70 IN | WEIGHT: 136 LBS

## 2017-10-12 DIAGNOSIS — Z09 SURGICAL FOLLOWUP VISIT: Primary | ICD-10-CM

## 2017-10-12 PROCEDURE — 99024 POSTOP FOLLOW-UP VISIT: CPT | Performed by: SURGERY

## 2017-10-12 NOTE — MR AVS SNAPSHOT
"              After Visit Summary   10/12/2017    Franklyn Hein    MRN: [id number]           Patient Information     Date Of Birth          10/28/1932        Visit Information        Provider Department      10/12/2017 1:30 PM Juanito Landavered MD [facility]      Today's Diagnoses     Surgical followup visit    -  1       Follow-ups after your visit        Follow-up notes from your care team     Return in about 4 weeks (around 2017) for EVAR CTA.      Who to contact     If you have questions or need follow up information about today's clinic visit or your schedule please contact [facility] directly at [phone].  Normal or non-critical lab and imaging results will be communicated to you by MyChart, letter or phone within 4 business days after the clinic has received the results. If you do not hear from us within 7 days, please contact the clinic through MyChart or phone. If you have a critical or abnormal lab result, we will notify you by phone as soon as possible.  Submit refill requests through [employer] or call your pharmacy and they will forward the refill request to us. Please allow 3 business days for your refill to be completed.          Additional Information About Your Visit        MyChart Information     [employer] lets you send messages to your doctor, view your test results, renew your prescriptions, schedule appointments and more. To sign up, go to [email or web address] . Click on \"Log in\" on the left side of the screen, which will take you to the Welcome page. Then click on \"Sign up Now\" on the right side of the page.     You will be asked to enter the access code listed below, as well as some personal information. Please follow the directions to create your username and password.     Your access code is: [email or web address]  Expires: 2017 12:36 PM     Your access code will  in 90 days. If you need help or " "a new code, please call your [facility] clinic or [phone].        Care EveryWhere ID     This is your Care EveryWhere ID. This could be used by other organizations to access your [facility] medical records  [id number]        Your Vitals Were     Pulse Height Pulse Oximetry BMI (Body Mass Index)          87 5' 10\" (1.778 m) 98% 19.51 kg/m2         Blood Pressure from Last 3 Encounters:   10/12/17 126/60   10/01/17 129/57   09/14/17 130/68    Weight from Last 3 Encounters:   10/12/17 136 lb (61.7 kg)   10/01/17 136 lb 3.9 oz (61.8 kg)   09/14/17 141 lb (64 kg)              Today, you had the following     No orders found for display       Primary Care Provider Office Phone # Fax #    Melvin Hoskins [phone]       [facility] [address] [facility] [address]        Equal Access to Services     OL FRYE AH: Hadii aad ku hadasho Soomaali, waaxda luqadaha, qaybta kaalmada adeegyada, waxay idiin hayaan mendel kharash la'maurilion . So [facility] [phone].    ATENCIÓN: Si habla español, tiene a bartlett disposición servicios gratuitos de asistencia lingüística. Llame al [phone].    We comply with applicable federal civil rights laws and [place] laws. We do not discriminate on the basis of race, color, national origin, age, disability, sex, sexual orientation, or gender identity.            Thank you!     Thank you for choosing [facility]  for your care. Our goal is always to provide you with excellent care. Hearing back from our patients is one way we can continue to improve our services. Please take a few minutes to complete the written survey that you may receive in the mail after your visit with us. Thank you!             Your Updated Medication List - Protect others around you: Learn how to safely use, store and throw away your medicines at www.disposemymeds.org.          This list is accurate as of: 10/12/17 11:59 PM.  Always use your most recent med list. "                   Brand Name Dispense Instructions for use Diagnosis    ACYCLOVIR PO      Take 400 mg by mouth 2 times daily (preventative-Chemo)        albuterol 108 (90 BASE) MCG/ACT Inhaler    PROAIR HFA/PROVENTIL HFA/VENTOLIN HFA     Inhale 1-2 puffs into the lungs every 4 hours as needed for shortness of breath / dyspnea or wheezing        AMLODIPINE BESYLATE PO      Take 2.5 mg by mouth At Bedtime        ASPIRIN EC PO      Take 81 mg by mouth every morning        ATORVASTATIN CALCIUM PO      Take 10 mg by mouth At Bedtime        CLEAR EYES MAXIMUM ITCHY EYE 0.012-0.25-0.25 % Soln   Generic drug:  Naphazoline-Glycerin-Zinc Sulf      Place 1-2 drops into both eyes 4 times daily as needed (itch relief)        CLEAR EYES TRIPLE ACTION 0.05-0.5-0.6 % Soln   Generic drug:  Tetrahydroz-Polyvinyl Al-Povid      Place 1-2 drops into both eyes 4 times daily as needed        fluticasone 220 MCG/ACT Inhaler    FLOVENT HFA     Inhale 1 puff into the lungs daily as needed        fluticasone-salmeterol 250-50 MCG/DOSE diskus inhaler    ADVAIR     Inhale 1 puff into the lungs every 12 hours as needed        GLUCOPHAGE XR PO      Take 1,000 mg by mouth daily with food In the afternoon with a snack. (patient takes 2 X 500 = 1,000 mg dose)        levETIRAcetam 250 MG tablet    KEPPRA    60 tablet    Take 1 tablet (250 mg) by mouth 2 times daily    Seizure (H)       LISINOPRIL PO      Take 10 mg by mouth At Bedtime        LORAZEPAM PO      Take 0.5-1 mg by mouth every 6 hours as needed for anxiety or nausea        oxyCODONE 5 MG IR tablet    ROXICODONE    12 tablet    Take 1 tablet (5 mg) by mouth every 6 hours as needed for moderate to severe pain or pain maximum 8 tablet(s) per day    Seizure (H)       PROCHLORPERAZINE MALEATE PO      Take 5-10 mg by mouth every 6 hours as needed for nausea (Chemo)

## 2017-10-12 NOTE — PROGRESS NOTES
"Franklyn Hein is an 84-year-old gentleman with metabolic syndrome, multiple myeloma, and recently diagnosed seizures who is POD #13 status post endovascular repair of his 7.2 cm AAA with bilateral common iliac artery involvement.  At that procedure we intentionally embolized the left internal iliac artery to enable the repair.  This was his first postoperative visit.    He was accompanied today by his wife, son and his daughter-in-law.  Apparently he's had quite a difficult time at home.  He had an unwitnessed seizure last Saturday but did not seek medical attention.  He currently is on low-dose Keppra and has no scheduled follow-up with his primary care physician or neurology.    He has also had a great deal of difficulty with what he describes as constant pain in his lower back and hip region.  At times he describes \"no strength\" in his left thigh and has fallen at home on 2 or 3 occasions despite utilizing a walker.  He has been taking oxycodone for this pain.  His appetite has been rather poor and in general he is rather weak and debilitated.  Review of the electronic medical record shows that he had oncology follow-up for his multiple myeloma on 10/6/17.  Labs at that time are notable for a hemoglobin of 8.1 down from his typical value of around 10.  I believe the thought is that the anemia is related to the treatment that he is currently undergoing for multiple myeloma.  This certainly could be contributing to his overall lack of energy and failure to thrive.    Exam:  Frail-appearing, elderly gentleman who requires some assistance arising from a wheelchair to the examination table.  Blood pressure was 126/60 with a pulse of 87.  His abdomen is benign.  Bilateral groin incisions are healing nicely.  2+ palpable femoral pulses bilaterally.  2+ palpable right dorsalis pedis and posterior tibial pulses.  2+ palpable left posterior tibial pulse with a triphasic dorsalis pedis Doppler " signal.      IMPRESSION:  1.  POD #13 status post EVAR with embolization of left hypogastric artery.  Widely patent right hypogastric artery with demonstrable cross-filling at the time of his EVAR.  Palpable pedal pulses bilaterally.  Certainly the ongoing back pain and left hip pain could be an acute response to the left hypogastric embolization.  This should be self-limiting and improve in the coming weeks.    2.  History of seizure disorder with questionable unwitnessed seizure this past weekend at home for which he did not seek medical attention.    3.  Multiple myeloma currently undergoing treatment with Dr. Dom Salcedo at [facility].      RECOMMENDATION:  I had a lengthy discussion with Mr. Hein and his family reviewing all the above.  I have reassured him that the back and left leg pain could be related to the left hypogastric embolization but that it should continue to improve.  He should not require narcotic pain meds for this condition.  He has normal renal function and I have suggested that he try nonsteroidal anti-inflammatories or Tylenol to help with his discomfort.  He should utilize his walker with ambulating and avoid situations which could predispose him to a fall.  Upon questioning him regarding the unwitnessed seizure they inform me that they have no follow-up for this condition.  He was seen by Dr. Hernandez of neurology at [facility] during that hospitalization.  The discharge summary states that he should follow-up with Dr. Hernandez as an outpatient.  I have encouraged Mr. Hein's family to work through his primary care physician to facilitate this follow-up and to address the seizure issue more thoroughly.  Perhaps the Keppra dose needs to be increased.  From a vascular standpoint, he will continue his medical regimen.  Follow-up will be with me in 1 month with a pre-clinic EVAR CTA as part of my surveillance protocol.    Leonardo Landaverde M.D.

## 2017-10-12 NOTE — LETTER
"[facility]  [address]  [place] [address]  Phone: [phone]  Fax: [phone]    2017    Re: Franklyn Hein - 10/28/1932    Franklyn Hein is an 84-year-old gentleman with metabolic syndrome, multiple myeloma, and recently diagnosed seizures who is POD #13 status post endovascular repair of his 7.2 cm AAA with bilateral common iliac artery involvement.  At that procedure we intentionally embolized the left internal iliac artery to enable the repair.  This was his first postoperative visit.     He was accompanied today by his wife, son and his daughter-in-law.  Apparently he's had quite a difficult time at home.  He had an unwitnessed seizure last Saturday but did not seek medical attention.  He currently is on low-dose Keppra and has no scheduled follow-up with his primary care physician or neurology.     He has also had a great deal of difficulty with what he describes as constant pain in his lower back and hip region.  At times he describes \"no strength\" in his left thigh and has fallen at home on 2 or 3 occasions despite utilizing a walker.  He has been taking oxycodone for this pain.  His appetite has been rather poor and in general he is rather weak and debilitated.  Review of the electronic medical record shows that he had oncology follow-up for his multiple myeloma on 10/6/17.  Labs at that time are notable for a hemoglobin of 8.1 down from his typical value of around 10.  I believe the thought is that the anemia is related to the treatment that he is currently undergoing for multiple myeloma.  This certainly could be contributing to his overall lack of energy and failure to thrive.     Exam:  Frail-appearing, elderly gentleman who requires some assistance arising from a wheelchair to the examination table.  Blood pressure was 126/60 with a pulse of 87.  His abdomen is benign.  Bilateral groin incisions are healing nicely.  2+ palpable " femoral pulses bilaterally.  2+ palpable right dorsalis pedis and posterior tibial pulses.  2+ palpable left posterior tibial pulse with a triphasic dorsalis pedis Doppler signal.     IMPRESSION:  1.  POD #13 status post EVAR with embolization of left hypogastric artery.  Widely patent right hypogastric artery with demonstrable cross-filling at the time of his EVAR.  Palpable pedal pulses bilaterally.  Certainly the ongoing back pain and left hip pain could be an acute response to the left hypogastric embolization.  This should be self-limiting and improve in the coming weeks.     2.  History of seizure disorder with questionable unwitnessed seizure this past weekend at home for which he did not seek medical attention.     3.  Multiple myeloma currently undergoing treatment with Dr. Dom Salcedo at [facility].        RECOMMENDATION:  I had a lengthy discussion with Mr. Hein and his family reviewing all the above.  I have reassured him that the back and left leg pain could be related to the left hypogastric embolization but that it should continue to improve.  He should not require narcotic pain meds for this condition.  He has normal renal function and I have suggested that he try nonsteroidal anti-inflammatories or Tylenol to help with his discomfort.  He should utilize his walker with ambulating and avoid situations which could predispose him to a fall.  Upon questioning him regarding the unwitnessed seizure they inform me that they have no follow-up for this condition.  He was seen by Dr. Hernandez of neurology at [facility] during that hospitalization.  The discharge summary states that he should follow-up with Dr. Hernandez as an outpatient.  I have encouraged Mr. Hein's family to work through his primary care physician to facilitate this follow-up and to address the seizure issue more thoroughly.  Perhaps the Keppra dose needs to be increased.  From a vascular standpoint, he will continue  his medical regimen.  Follow-up will be with me in 1 month with a pre-clinic EVAR CTA as part of my surveillance protocol.     Leonardo Landaverde M.D.

## 2017-10-12 NOTE — NURSING NOTE
"Chief Complaint   Patient presents with     Surgical Followup     1st PO EMBOLIZATION OF LEFT INTERNAL ILIAC WITH AMPLATZER PLUG       Initial /60 (BP Location: Right arm, Cuff Size: Adult Regular)  Pulse 87  Ht 5' 10\" (1.778 m)  Wt 136 lb (61.7 kg)  SpO2 98%  BMI 19.51 kg/m2 Estimated body mass index is 19.51 kg/(m^2) as calculated from the following:    Height as of this encounter: 5' 10\" (1.778 m).    Weight as of this encounter: 136 lb (61.7 kg).  Medication Reconciliation: complete     Maggie James  CMA      "

## 2017-10-13 ENCOUNTER — TELEPHONE (OUTPATIENT)
Dept: OTHER | Facility: CLINIC | Age: 82
End: 2017-10-13

## 2017-10-13 DIAGNOSIS — I71.40 ABDOMINAL AORTIC ANEURYSM (H): Primary | ICD-10-CM

## 2017-10-13 NOTE — TELEPHONE ENCOUNTER
Per  request, called pt to check in.  Pt reports that his pain is better since taking NSAID and applying heat.  Pt also reports that he is scheduled for f/u with  (neuro) on 10/25/17 and he is scheduled to start chemo 10/17/17. Offered to scheduled pt for 1 month f/u with EVAR CTA, pt states that his wife will call to schedule.    Prema Guevara, MODESTAN, RN

## 2017-10-18 ENCOUNTER — TRANSFERRED RECORDS (OUTPATIENT)
Dept: HEALTH INFORMATION MANAGEMENT | Facility: CLINIC | Age: 82
End: 2017-10-18

## 2017-10-30 ENCOUNTER — HOSPITAL ENCOUNTER (OUTPATIENT)
Facility: CLINIC | Age: 82
Discharge: HOME | End: 2017-10-30
Attending: INTERNAL MEDICINE | Admitting: INTERNAL MEDICINE
Payer: MEDICARE

## 2017-10-30 VITALS
RESPIRATION RATE: 14 BRPM | WEIGHT: 128 LBS | OXYGEN SATURATION: 95 % | SYSTOLIC BLOOD PRESSURE: 154 MMHG | DIASTOLIC BLOOD PRESSURE: 71 MMHG | BODY MASS INDEX: 18.37 KG/M2

## 2017-10-30 LAB
GLUCOSE BLDC GLUCOMTR-MCNC: 134 MG/DL (ref 70–99)
UPPER GI ENDOSCOPY: NORMAL

## 2017-10-30 PROCEDURE — 25000128 H RX IP 250 OP 636: Performed by: INTERNAL MEDICINE

## 2017-10-30 PROCEDURE — 40000104 ZZH STATISTIC MODERATE SEDATION < 10 MIN: Performed by: INTERNAL MEDICINE

## 2017-10-30 PROCEDURE — 88312 SPECIAL STAINS GROUP 1: CPT | Performed by: INTERNAL MEDICINE

## 2017-10-30 PROCEDURE — 88312 SPECIAL STAINS GROUP 1: CPT | Mod: 26 | Performed by: INTERNAL MEDICINE

## 2017-10-30 PROCEDURE — 88305 TISSUE EXAM BY PATHOLOGIST: CPT | Performed by: INTERNAL MEDICINE

## 2017-10-30 PROCEDURE — 43239 EGD BIOPSY SINGLE/MULTIPLE: CPT | Performed by: INTERNAL MEDICINE

## 2017-10-30 PROCEDURE — 88305 TISSUE EXAM BY PATHOLOGIST: CPT | Mod: 26 | Performed by: INTERNAL MEDICINE

## 2017-10-30 PROCEDURE — 82962 GLUCOSE BLOOD TEST: CPT

## 2017-10-30 RX ORDER — ONDANSETRON 2 MG/ML
4 INJECTION INTRAMUSCULAR; INTRAVENOUS
Status: DISCONTINUED | OUTPATIENT
Start: 2017-10-30 | End: 2017-10-30 | Stop reason: HOSPADM

## 2017-10-30 RX ORDER — FENTANYL CITRATE 50 UG/ML
INJECTION, SOLUTION INTRAMUSCULAR; INTRAVENOUS PRN
Status: DISCONTINUED | OUTPATIENT
Start: 2017-10-30 | End: 2017-10-30 | Stop reason: HOSPADM

## 2017-10-30 RX ORDER — LIDOCAINE 40 MG/G
CREAM TOPICAL
Status: DISCONTINUED | OUTPATIENT
Start: 2017-10-30 | End: 2017-10-30 | Stop reason: HOSPADM

## 2017-10-30 RX ORDER — NALOXONE HYDROCHLORIDE 0.4 MG/ML
.1-.4 INJECTION, SOLUTION INTRAMUSCULAR; INTRAVENOUS; SUBCUTANEOUS
Status: DISCONTINUED | OUTPATIENT
Start: 2017-10-30 | End: 2017-10-30 | Stop reason: HOSPADM

## 2017-10-30 RX ORDER — FLUMAZENIL 0.1 MG/ML
0.2 INJECTION, SOLUTION INTRAVENOUS
Status: DISCONTINUED | OUTPATIENT
Start: 2017-10-30 | End: 2017-10-30 | Stop reason: HOSPADM

## 2017-10-30 RX ORDER — ONDANSETRON 4 MG/1
4 TABLET, ORALLY DISINTEGRATING ORAL EVERY 6 HOURS PRN
Status: DISCONTINUED | OUTPATIENT
Start: 2017-10-30 | End: 2017-10-30 | Stop reason: HOSPADM

## 2017-10-30 RX ORDER — ONDANSETRON 2 MG/ML
4 INJECTION INTRAMUSCULAR; INTRAVENOUS EVERY 6 HOURS PRN
Status: DISCONTINUED | OUTPATIENT
Start: 2017-10-30 | End: 2017-10-30 | Stop reason: HOSPADM

## 2017-10-30 NOTE — PROCEDURES
PRE-PROCEDURE H&P    CHIEF COMPLAINT / REASON FOR PROCEDURE:  dysphagia    PERTINENT HISTORY :    Past Medical History:   Diagnosis Date     AAA (abdominal aortic aneurysm) (H)      Anemia     macrocytic     Ataxia      Cancer (H)      COPD (chronic obstructive pulmonary disease) (H)      Diabetes (H)     type 2     Dyspnea      Fatigue      Hypercholesteremia      Hypertension      Multiple myeloma (H)      Nocturia      Pulmonary fibrosis (H)      Tubular adenoma 2007      Past Surgical History:   Procedure Laterality Date     APPENDECTOMY       COLONOSCOPY       ENDOVASCULAR REPAIR ANEURYSM ABDOMINAL AORTA N/A 9/29/2017    Procedure: ENDOVASCULAR REPAIR ANEURYSM ABDOMINAL AORTA;  EMBOLIZATION LEFT INTERNAL ILIAC ARTERY WITH AMPLATZER PLUG; ENDOVASCULAR ABDOMINAL AORTIC ANEURYMS REPAIR;  Surgeon: Juanito Landaverde MD;  Location: [facility] OR     EYE SURGERY      Cataract Phacoemulsification     VASCULAR SURGERY  2017    AAA Aortic Aneurysm Repair         Bleeding tendencies:  No    Relevant Family History:  NONE     Relevant Social History:  NONE      A relevant review of systems was performed and was negative      ALLERGIES/SENSITIVITIES: No Known Allergies    CURRENT MEDICATIONS:   No current outpatient prescriptions on file.        PRE-SEDATION ASSESSMENT:    Lung Exam:  normal  Heart Exam:  normal  Airway Exam: normal  Previous reaction to anesthesia/sedation:   No  Sedation plan based on assessment: Moderate (conscious) sedation  ASA Classification:  3 - Severe systemic disease, but not incapacitating        IMPRESSION:  dysphagia    PLAN:  EGD     Adilene Phipps  [facility]  Office: [phone]

## 2017-10-30 NOTE — IP AVS SNAPSHOT
MRN:[id number]                      After Visit Summary   10/30/2017    Franklyn Hein    MRN: [id number]           Thank you!     Thank you for choosing [facility] for your care. Our goal is always to provide you with excellent care. Hearing back from our patients is one way we can continue to improve our services. Please take a few minutes to complete the written survey that you may receive in the mail after you visit. If you would like to speak to someone directly about your visit please contact Patient Relations at [phone]. Thank you!          Patient Information     Date Of Birth          10/28/1932        About your hospital stay     You were admitted on:  October 30, 2017 You last received care in the:  [facility]    You were discharged on:  October 30, 2017       Who to Call     For medical emergencies, please call 911.  For non-urgent questions about your medical care, please call your primary care provider or clinic, [phone]  For questions related to your surgery, please call your surgery clinic        Attending Provider     Provider Adilene Shah MD Gastroenterology       Primary Care Provider Office Phone # Fax #    Melvin Hoskins [phone]      Your next 10 appointments already scheduled     Nov 16, 2017 11:00 AM CST   (Arrive by 10:45 AM)   CTA ANGIOGRAM ABDOMEN PELVIS with [id number] ([facility])    [id number] [facility] [address]  [facility] [address] [phone]           Please bring any scans or X-rays taken at other hospitals, if similar tests were done. Also bring a list of your medicines, including vitamins, minerals and over-the-counter drugs. It is safest to leave personal items at home.  Be sure to tell your doctor:   If you have any allergies.   If there s any chance you are pregnant.   If you are breastfeeding.   If you have any  "special needs.  You will have contrast for this exam. To prepare:   Do not eat or drink for 2 hours before your exam. If you need to take medicine, you may take it with small sips of water. (We may ask you to take liquid medicine as well.)   The day before your exam, drink extra fluids at least six 8-ounce glasses (unless your doctor tells you to restrict your fluids).  Patients over 70 or patients with diabetes or kidney problems:   If you haven t had a blood test (creatinine test) within the last 30 days, go to your clinic or Diagnostic Imaging Department for this test.  If you have diabetes:   If your kidney function is normal, continue taking your metformin (Avandamet, Glucophage, Glucovance, Metaglip) on the day of your exam.   If your kidney function is abnormal, wait 48 hours before restarting this medicine.  Please wear loose clothing, such as a sweat suit or jogging clothes. Avoid snaps, zippers and other metal. We may ask you to undress and put on a hospital gown.  If you have any questions, please call the Imaging Department where you will have your exam.            Nov 16, 2017  1:30 PM CST   Return Visit with Juanito Landaverde MD   [facility]    [address]  [facility] [id number] [phone]              Pending Results     No orders found from 10/28/2017 to 10/31/2017.            Admission Information     Date & Time Provider Department Dept. Phone    10/30/2017 Adilene Phipps MD [facility] [phone]      Your Vitals Were     Blood Pressure Respirations Weight Pulse Oximetry BMI (Body Mass Index)       154/71 14 58.1 kg (128 lb) 95% 18.37 kg/m2       MyChart Information     [employer] lets you send messages to your doctor, view your test results, renew your prescriptions, schedule appointments and more. To sign up, go to [email or web address] . Click on \"Log in\" on the left side of the screen, " "which will take you to the Welcome page. Then click on \"Sign up Now\" on the right side of the page.     You will be asked to enter the access code listed below, as well as some personal information. Please follow the directions to create your username and password.     Your access code is: [email or web address]  Expires: 2017 12:36 PM     Your access code will  in 90 days. If you need help or a new code, please call your [facility] clinic or [phone].        Care EveryWhere ID     This is your Care EveryWhere ID. This could be used by other organizations to access your [facility] medical records  [id number]        Equal Access to Services     LO FRYE : Deidre Burns, liliam carrera, tommy saleh, klever donovan . So [facility] [phone].    ATENCIÓN: Si habla español, tiene a bartlett disposición servicios gratuitos de asistencia lingüística. Llame al [phone].    We comply with applicable federal civil rights laws and [place] laws. We do not discriminate on the basis of race, color, national origin, age, disability, sex, sexual orientation, or gender identity.               Review of your medicines      CONTINUE these medicines which have NOT CHANGED        Dose / Directions    ACYCLOVIR PO        Dose:  400 mg   Take 400 mg by mouth 2 times daily (preventative-Chemo)   Refills:  0       albuterol 108 (90 BASE) MCG/ACT Inhaler   Commonly known as:  PROAIR HFA/PROVENTIL HFA/VENTOLIN HFA        Dose:  1-2 puff   Inhale 1-2 puffs into the lungs every 4 hours as needed for shortness of breath / dyspnea or wheezing   Refills:  0       AMLODIPINE BESYLATE PO        Dose:  2.5 mg   Take 2.5 mg by mouth At Bedtime   Refills:  0       ASPIRIN EC PO        Dose:  81 mg   Take 81 mg by mouth every morning   Refills:  0       ATORVASTATIN CALCIUM PO        Dose:  10 mg   Take 10 mg by mouth At Bedtime   Refills:  0       CLEAR EYES MAXIMUM ITCHY EYE 0.012-0.25-0.25 " % Soln   Generic drug:  Naphazoline-Glycerin-Zinc Sulf        Dose:  1-2 drop   Place 1-2 drops into both eyes 4 times daily as needed (itch relief)   Refills:  0       CLEAR EYES TRIPLE ACTION 0.05-0.5-0.6 % Soln   Generic drug:  Tetrahydroz-Polyvinyl Al-Povid        Dose:  1-2 drop   Place 1-2 drops into both eyes 4 times daily as needed   Refills:  0       fluticasone 220 MCG/ACT Inhaler   Commonly known as:  FLOVENT HFA        Dose:  1 puff   Inhale 1 puff into the lungs daily as needed   Refills:  0       fluticasone-salmeterol 250-50 MCG/DOSE diskus inhaler   Commonly known as:  ADVAIR        Dose:  1 puff   Inhale 1 puff into the lungs every 12 hours as needed   Refills:  0       GLUCOPHAGE XR PO        Dose:  1000 mg   Take 1,000 mg by mouth daily with food In the afternoon with a snack. (patient takes 2 X 500 = 1,000 mg dose)   Refills:  0       levETIRAcetam 250 MG tablet   Commonly known as:  KEPPRA   Used for:  Seizure (H)        Dose:  250 mg   Take 1 tablet (250 mg) by mouth 2 times daily   Quantity:  60 tablet   Refills:  0       LISINOPRIL PO        Dose:  10 mg   Take 10 mg by mouth At Bedtime   Refills:  0       LORAZEPAM PO        Dose:  0.5-1 mg   Take 0.5-1 mg by mouth every 6 hours as needed for anxiety or nausea   Refills:  0       oxyCODONE 5 MG IR tablet   Commonly known as:  ROXICODONE   Used for:  Seizure (H)        Dose:  5 mg   Take 1 tablet (5 mg) by mouth every 6 hours as needed for moderate to severe pain or pain maximum 8 tablet(s) per day   Quantity:  12 tablet   Refills:  0       PROCHLORPERAZINE MALEATE PO        Dose:  5-10 mg   Take 5-10 mg by mouth every 6 hours as needed for nausea (Chemo)   Refills:  0                Protect others around you: Learn how to safely use, store and throw away your medicines at www.disposemymeds.org.             Medication List: This is a list of all your medications and when to take them. Check marks below indicate your daily home schedule. Keep  this list as a reference.      Medications           Morning Afternoon Evening Bedtime As Needed    ACYCLOVIR PO   Take 400 mg by mouth 2 times daily (preventative-Chemo)                                albuterol 108 (90 BASE) MCG/ACT Inhaler   Commonly known as:  PROAIR HFA/PROVENTIL HFA/VENTOLIN HFA   Inhale 1-2 puffs into the lungs every 4 hours as needed for shortness of breath / dyspnea or wheezing                                AMLODIPINE BESYLATE PO   Take 2.5 mg by mouth At Bedtime                                ASPIRIN EC PO   Take 81 mg by mouth every morning                                ATORVASTATIN CALCIUM PO   Take 10 mg by mouth At Bedtime                                CLEAR EYES MAXIMUM ITCHY EYE 0.012-0.25-0.25 % Soln   Place 1-2 drops into both eyes 4 times daily as needed (itch relief)   Generic drug:  Naphazoline-Glycerin-Zinc Sulf                                CLEAR EYES TRIPLE ACTION 0.05-0.5-0.6 % Soln   Place 1-2 drops into both eyes 4 times daily as needed   Generic drug:  Tetrahydroz-Polyvinyl Al-Povid                                fluticasone 220 MCG/ACT Inhaler   Commonly known as:  FLOVENT HFA   Inhale 1 puff into the lungs daily as needed                                fluticasone-salmeterol 250-50 MCG/DOSE diskus inhaler   Commonly known as:  ADVAIR   Inhale 1 puff into the lungs every 12 hours as needed                                GLUCOPHAGE XR PO   Take 1,000 mg by mouth daily with food In the afternoon with a snack. (patient takes 2 X 500 = 1,000 mg dose)                                levETIRAcetam 250 MG tablet   Commonly known as:  KEPPRA   Take 1 tablet (250 mg) by mouth 2 times daily                                LISINOPRIL PO   Take 10 mg by mouth At Bedtime                                LORAZEPAM PO   Take 0.5-1 mg by mouth every 6 hours as needed for anxiety or nausea                                oxyCODONE 5 MG IR tablet   Commonly known as:  ROXICODONE   Take 1  tablet (5 mg) by mouth every 6 hours as needed for moderate to severe pain or pain maximum 8 tablet(s) per day                                PROCHLORPERAZINE MALEATE PO   Take 5-10 mg by mouth every 6 hours as needed for nausea (Chemo)                                          More Information        Candida Esophagitis    The esophagus is the tube that connects the throat to the stomach. Esophagitis is an inflammation of the lining of the esophagus. This can occur from acid reflux or an infection. The most common cause of infection is a fungus called candida. (This infection is also known as thrush.)   Esophagitis may cause pain or trouble swallowing. It may also cause fever and bleeding. If left untreated, the condition can scar the esophagus. Scarring may lead to narrowing of the esophagus and permanent trouble swallowing.  In adults, esophagitis with candida infection is most common in people who have a weakened immune system. It may also sometimes affect people who have had chemotherapy or radiation or use inhaled steroid medicines.  In some cases, endoscopy (looking inside the esophagus with a small tube and light) is done. Candida esophagitis usually responds well to medical treatment. To prevent recurrence, factors that contribute to the infection must also be treated.  Home care    If your doctor has prescribed medicines, take all of them as directed.    To help prevent irritation of the throat, avoid spicy foods (such as pepper, chili powder, knowles, nutmeg). Avoid hard foods (such as nuts, crackers, raw vegetables). And avoid acidic foods and drinks (such as tomatoes, oranges, grapefruits, citrus juices).    Until you can swallow without pain, follow a combined liquid and soft diet. Include such foods as applesauce, cooked cereals, mashed potatoes, and soups.    Take small bites and chew your food thoroughly.    Avoid alcohol and smoking.    Practice good oral hygiene.  Follow-up care  Follow up with your  healthcare provider or as advised by our staff.  When to seek medical advice  Call your healthcare provider for any of the following:    Increasing pain with swallowing    Inability to eat or drink    Dizziness or weakness    Fever of 100.4 F (38 C) or higher, or as directed by your healthcare provider  Date Last Reviewed: 6/23/2015 2000-2017 [employer]. [address] [place] [address]. All rights reserved. This information is not intended as a substitute for professional medical care. Always follow your healthcare professional's instructions.                Candida Infection: Thrush  Thrush is a fungal infection in the mouth and throat. Thrush does not usually affect healthy adults. It is more common in people with a weak immune system. It is also more likely if you take antibiotics. Thrush is normally not contagious.  Understanding fungus in the mouth and throat  Your mouth and throat normally contain millions of tiny organisms. These include bacteria and yeasts. Many of these do not cause any problems. In fact, they may help fight disease.  Yeasts are a type of fungus. A type of yeast called Candida normally lives on the membranes of your mouth and throat. Usually, this yeast grows only in small amounts and is harmless. But in some cases, Candida can grow out of control and cause thrush. Thrush is related to other kinds of Candida infections that can grow all over the body. Thrush refers to an infection of only the mouth and throat.  What causes thrush?  Thrush happens when something lets too much Candida grow inside your mouth and throat. Certain things that change the normal balance of organisms in the mouth can lead to thrush. One example is antibiotic medicine. This medicine may kill some of the normal bacteria in your mouth. Candida can then grow freely. People on antibiotics have an increased risk for thrush.  You have a higher risk for thrush if you:    Wear dentures    Are  getting chemotherapy    Are getting radiation therapy    Have diabetes    Have a transplanted organ    Use corticosteroids, including inhaled corticosteroids for lung disease    Have a weak immune system, such as from AIDS    Are an older adult  Symptoms of thrush  Symptoms of thrush can include:    A dry, cottony feeling in your mouth    Cracking at the corners of the mouth    Loss of taste    Pain while eating or swallowing    White patches on the tongue and around the sides of the mouth  Diagnosing thrush  Your healthcare provider will ask about your medical history and your symptoms. He or she will look closely at your mouth and throat. White or red patches will be scraped with a tongue depressor. The sample will be sent to a lab to test. This test can usually confirm thrush.  If you have thrush, you may also have esophageal candidiasis. This is common in people who have HIV or a weak immune system. Your healthcare provider may check for this condition with an upper endoscopy. This is a procedure to look at the esophagus. A tissue sample may be taken to test.  Treatment for thrush  Thrush is usually treated with antifungal medicine. The medicine is put directly in your mouth and throat. You may be given a  swish and swallow  medicine or an antifungal lozenge.  In some cases, you may need an antifungal pill. This can remove Candida throughout your body. Or you may need medicine through an intravenous line ( IV). These treatments depend on how severe your infection is, and what other health conditions you have.  If you are at high risk for thrush, you may need to keep taking oral antifungal medicine. This is to help prevent thrush in the future.  What happens if you don t get treated for thrush?  If untreated, the Candida may spread throughout your body. They may even enter your bloodstream. This can cause serious problems, such as organ failure and even death. Bloodstream infection may need to be treated with  high doses of antifungal medicine through an IV.  Systemic infection is much more likely in people who are very ill. It is also more common in those who have serious problems with their immune system. Additional risk factors for systemic infection in very ill people include:    Central venous lines    IV nutrition    Use of broad-spectrum antibiotics    Kidney failure    Recent surgery  Preventing thrush  You may be able to help prevent some cases of thrush. Make sure to:    Practice good oral hygiene. Try using a chlorhexidine mouthwash.    Clean your dentures regularly as instructed. Make sure they fit you correctly.    After using a corticosteroid inhaler, rinse out your mouth with water or mouthwash.    Do not use broad-spectrum antibiotics, if possible.    Get treated for health problems that increase your risk for thrush, such as diabetes.     When to call the healthcare provider  Call your healthcare provider right away if you have any of these:    Cottony feeling in your mouth    Loss of taste    Pain while eating or swallowing    White patches or plaques on your tongue or inside your mouth   Date Last Reviewed: 5/1/2017 2000-2017 [employer]. [address] [place] [address]. All rights reserved. This information is not intended as a substitute for professional medical care. Always follow your healthcare professional's instructions.                Eosinophilic Esophagitis (EoE)  Eosinophilic esophagitis (EoE) is an allergic reaction. It occurs in the esophagus. This is the tube that leads from the mouth down to the stomach. The immune system responds to an allergen by making white blood cells called eosinophils. The esophagus then becomes red and swollen. EoE is much more common in people with asthma or allergies.  What causes eosinophilic esophagitis?  Researchers are working to understand the causes of EoE. It may be caused by allergens in the environment. Or it may be caused by  food allergens. Foods that are often found to be the cause of EoE include wheat, dairy foods, eggs, and soy. You may be at higher risk for EoE if you have a family history of allergies or EoE.  Symptoms of eosinophilic esophagitis  Symptoms of EoE vary from person to person and may include:    Trouble swallowing    Chest pain    Pain in the belly (abdomen)    Vomiting    Food getting stuck in the throat (a medical emergency)  Diagnosing eosinophilic esophagitis  Your health care provider will ask about your health history and symptoms. He or she will likely want to test you for allergies. You may have an endoscopy. A thin, flexible tube (endoscope) is passed through your mouth and down your throat. The scope has a camera. This lets your health care provider look at your esophagus. The doctor will check for signs of inflammation and an increased number of eosinophils. You may have a biopsy during the procedure. This is a small tissue sample taken from your esophagus.  Other diseases can cause eosinophils in the esophagus. These include gastroesophageal reflux disease (GERD) and inflammatory bowel disease. Your health care provider will check for these.  Treatment for eosinophilic esophagitis  You will likely work with special doctors for treatment. You may see an allergy doctor. And you may see a doctor who treats digestive problems (gastroenterologist). Your treatment may include:    Medicine. No medicines can cure EoE. But some can help reduce the redness and swelling. These include corticosteroids and proton pump inhibitors.    Elimination diet. Not eating certain foods can also help reduce the redness and swelling in your esophagus. These may include dairy foods, egg, wheat, soy, peanut, tree nuts, and fish. Your health care team will give you a plan for finding out what foods may cause your symptoms. This will likely include an elimination diet. On this type of diet, you eat very few foods at first. You then  slowly add more foods to see if you have a reaction. An EoE reaction may take days or weeks to develop. Keep this in mind when starting a food elimination diet. It may take some time after avoiding a food to see if that strategy worked.  Living with EoE  You can help manage EoE by learning what things cause your allergic reaction. You will need to avoid these things ongoing to prevent symptoms of EoE.  When to call your health care provider  Call your health care provider if you have any of the following:    Increasing weight loss    Increase in vomiting    Chest pain     When to call 911  Call 911 if you have:    Food stuck in your throat    Trouble breathing or talking   Date Last Reviewed: 6/10/2015    5559-0679 [employer]. [address] [place] [address], [place] [address]. All rights reserved. This information is not intended as a substitute for professional medical care. Always follow your healthcare professional's instructions.

## 2017-10-31 LAB — COPATH REPORT: NORMAL

## 2017-11-07 ENCOUNTER — TRANSFERRED RECORDS (OUTPATIENT)
Dept: HEALTH INFORMATION MANAGEMENT | Facility: CLINIC | Age: 82
End: 2017-11-07

## 2017-11-16 ENCOUNTER — HOSPITAL ENCOUNTER (OUTPATIENT)
Dept: CT IMAGING | Facility: CLINIC | Age: 82
Discharge: HOME | End: 2017-11-16
Attending: SURGERY | Admitting: SURGERY
Payer: MEDICARE

## 2017-11-16 ENCOUNTER — OFFICE VISIT (OUTPATIENT)
Dept: SURGERY | Facility: CLINIC | Age: 82
End: 2017-11-16
Attending: SURGERY
Payer: COMMERCIAL

## 2017-11-16 VITALS
WEIGHT: 136 LBS | HEIGHT: 70 IN | SYSTOLIC BLOOD PRESSURE: 122 MMHG | HEART RATE: 87 BPM | OXYGEN SATURATION: 98 % | BODY MASS INDEX: 19.47 KG/M2 | DIASTOLIC BLOOD PRESSURE: 78 MMHG

## 2017-11-16 DIAGNOSIS — I71.40 ABDOMINAL AORTIC ANEURYSM (H): ICD-10-CM

## 2017-11-16 DIAGNOSIS — Z09 SURGICAL FOLLOW-UP CARE: Primary | ICD-10-CM

## 2017-11-16 DIAGNOSIS — Z95.828 HISTORY OF ENDOVASCULAR STENT GRAFT FOR ABDOMINAL AORTIC ANEURYSM: ICD-10-CM

## 2017-11-16 LAB
CREAT BLD-MCNC: 1 MG/DL (ref 0.66–1.25)
GFR SERPL CREATININE-BSD FRML MDRD: 71 ML/MIN/1.7M2

## 2017-11-16 PROCEDURE — 99024 POSTOP FOLLOW-UP VISIT: CPT | Performed by: SURGERY

## 2017-11-16 PROCEDURE — 74174 CTA ABD&PLVS W/CONTRAST: CPT

## 2017-11-16 PROCEDURE — 82565 ASSAY OF CREATININE: CPT

## 2017-11-16 PROCEDURE — 25000128 H RX IP 250 OP 636: Performed by: RADIOLOGY

## 2017-11-16 RX ORDER — IOPAMIDOL 755 MG/ML
500 INJECTION, SOLUTION INTRAVASCULAR ONCE
Status: COMPLETED | OUTPATIENT
Start: 2017-11-16 | End: 2017-11-16

## 2017-11-16 RX ADMIN — IOPAMIDOL 125 ML: 755 INJECTION, SOLUTION INTRAVENOUS at 12:33

## 2017-11-16 NOTE — MR AVS SNAPSHOT
"              After Visit Summary   11/16/2017    Franklyn Hein    MRN: [id number]           Patient Information     Date Of Birth          10/28/1932        Visit Information        Provider Department      11/16/2017 1:30 PM Juanito Landaverde MD [facility]      Today's Diagnoses     Surgical follow-up care    -  1    History of endovascular stent graft for abdominal aortic aneurysm           Follow-ups after your visit        Follow-up notes from your care team     Return in about 1 year (around 11/16/2018) for EVAR CTA.      Who to contact     If you have questions or need follow up information about today's clinic visit or your schedule please contact [facility] directly at [phone].  Normal or non-critical lab and imaging results will be communicated to you by [employer], letter or phone within 4 business days after the clinic has received the results. If you do not hear from us within 7 days, please contact the clinic through [employer] or phone. If you have a critical or abnormal lab result, we will notify you by phone as soon as possible.  Submit refill requests through [employer] or call your pharmacy and they will forward the refill request to us. Please allow 3 business days for your refill to be completed.          Additional Information About Your Visit        [employer] Information     Partly lets you send messages to your doctor, view your test results, renew your prescriptions, schedule appointments and more. To sign up, go to [email or web address] . Click on \"Log in\" on the left side of the screen, which will take you to the Welcome page. Then click on \"Sign up Now\" on the right side of the page.     You will be asked to enter the access code listed below, as well as some personal information. Please follow the directions to create your username and password.     Your access code is: [email or web address]  Expires: 3/26/2018  " "4:56 PM     Your access code will  in 90 days. If you need help or a new code, please call your [place] clinic or [phone].        Care EveryWhere ID     This is your Care EveryWhere ID. This could be used by other organizations to access your [place] medical records  [id number]        Your Vitals Were     Pulse Height Pulse Oximetry BMI (Body Mass Index)          87 5' 10\" (1.778 m) 98% 19.51 kg/m2         Blood Pressure from Last 3 Encounters:   17 122/78   10/30/17 154/71   10/12/17 126/60    Weight from Last 3 Encounters:   17 136 lb (61.7 kg)   10/30/17 128 lb (58.1 kg)   10/12/17 136 lb (61.7 kg)              Today, you had the following     No orders found for display       Primary Care Provider Office Phone # Fax #    Melvin Hoskins [phone]       [facility] [address] [facility] [address]        Equal Access to Services     [facility]ANY [facility] : Hadii aad ku hadasho Soomaali, waaxda luqadaha, qaybta kaalmada adeegyada, waxay julieta hayanthony donovan . So [facility] [phone].    ATENCIÓN: Si habla español, tiene a bartlett disposición servicios gratuitos de asistencia lingüística. Llame al [phone].    We comply with applicable federal civil rights laws and [place] laws. We do not discriminate on the basis of race, color, national origin, age, disability, sex, sexual orientation, or gender identity.            Thank you!     Thank you for choosing [facility]  for your care. Our goal is always to provide you with excellent care. Hearing back from our patients is one way we can continue to improve our services. Please take a few minutes to complete the written survey that you may receive in the mail after your visit with us. Thank you!             Your Updated Medication List - Protect others around you: Learn how to safely use, store and throw away your medicines at www.disposemymeds.org.          This list is " accurate as of: 11/16/17 11:59 PM.  Always use your most recent med list.                   Brand Name Dispense Instructions for use Diagnosis    ACYCLOVIR PO      Take 400 mg by mouth 2 times daily (preventative-Chemo)        albuterol 108 (90 BASE) MCG/ACT Inhaler    PROAIR HFA/PROVENTIL HFA/VENTOLIN HFA     Inhale 1-2 puffs into the lungs every 4 hours as needed for shortness of breath / dyspnea or wheezing        AMLODIPINE BESYLATE PO      Take 2.5 mg by mouth At Bedtime        ASPIRIN EC PO      Take 81 mg by mouth every morning        ATORVASTATIN CALCIUM PO      Take 10 mg by mouth At Bedtime        CLEAR EYES MAXIMUM ITCHY EYE 0.012-0.25-0.25 % Soln   Generic drug:  Naphazoline-Glycerin-Zinc Sulf      Place 1-2 drops into both eyes 4 times daily as needed (itch relief)        CLEAR EYES TRIPLE ACTION 0.05-0.5-0.6 % Soln   Generic drug:  Tetrahydroz-Polyvinyl Al-Povid      Place 1-2 drops into both eyes 4 times daily as needed        fluticasone 220 MCG/ACT Inhaler    FLOVENT HFA     Inhale 1 puff into the lungs daily as needed        fluticasone-salmeterol 250-50 MCG/DOSE diskus inhaler    ADVAIR     Inhale 1 puff into the lungs every 12 hours as needed        GLUCOPHAGE XR PO      Take 500 mg by mouth daily with food In the afternoon with a snack. (patient takes 2 X 500 = 1,000 mg dose)        levETIRAcetam 250 MG tablet    KEPPRA    60 tablet    Take 1 tablet (250 mg) by mouth 2 times daily    Seizure (H)       LORAZEPAM PO      Take 0.5-1 mg by mouth every 6 hours as needed for anxiety or nausea        PROCHLORPERAZINE MALEATE PO      Take 5-10 mg by mouth every 6 hours as needed for nausea (Chemo)

## 2017-11-16 NOTE — LETTER
[facility] [address]  [place] [address]  Phone: [phone]  Fax: [phone]    2017    Re: Franklyn Hein, : 10/28/1932    Franklyn Hein returns today 6 weeks status post EVAR with embolization of the left hypogastric artery and extension into the left external iliac artery.  We also placed 6 Endo anchors at that time.  He presents today for his second postoperative visit and a CTA of the abdomen and pelvis.  He continues with treatment for multiple myeloma.  Fortunately, his left buttock claudication symptoms seen to be improving.     Exam:  Well-healed bilateral groin incisions.  Palpable pedal pulses bilaterally.     Imaging:   Review of today's CTA shows a well-positioned endograft with no evidence of leak.  Maximal aneurysmal diameter is now 6.6 cm decreased from 7.0 cm preop     IMPRESSION:  6 weeks status post EVAR with left hypogastric embolization clinically doing well. Well-positioned endograft with decreased aneurysmal diameter.     PLAN:  I reviewed all the above with Franklyn and his family.  He will continue his medical regimen including daily aspirin.  Vascular surgical follow-up will be with me in 1 year with a repeat CTA of the abdomen and pelvis.     Leonardo Landaverde M.D.

## 2017-12-19 ENCOUNTER — TRANSFERRED RECORDS (OUTPATIENT)
Dept: HEALTH INFORMATION MANAGEMENT | Facility: CLINIC | Age: 82
End: 2017-12-19

## 2017-12-26 NOTE — PROGRESS NOTES
Franklyn Hein returns today 6 weeks status post EVAR with embolization of the left hypogastric artery and extension into the left external iliac artery.  We also placed 6 Endo anchors at that time.  He presents today for his second postoperative visit and a CTA of the abdomen and pelvis.  He continues with treatment for multiple myeloma.  Fortunately, his left buttock claudication symptoms seen to be improving.    Exam:  Well-healed bilateral groin incisions.  Palpable pedal pulses bilaterally.    Imaging:   Review of today's CTA shows a well-positioned endograft with no evidence of leak.  Maximal aneurysmal diameter is now 6.6 cm decreased from 7.0 cm preop    IMPRESSION:  6 weeks status post EVAR with left hypogastric embolization clinically doing well.  Well-positioned endograft with decreased aneurysmal diameter.    PLAN:  I reviewed all the above with Franklyn and his family.  He will continue his medical regimen including daily aspirin.  Vascular surgical follow-up will be with me in 1 year with a repeat CTA of the abdomen and pelvis.    Leonardo Landaverde M.D.

## 2018-03-14 ENCOUNTER — TRANSFERRED RECORDS (OUTPATIENT)
Dept: HEALTH INFORMATION MANAGEMENT | Facility: CLINIC | Age: 83
End: 2018-03-14

## 2018-03-14 LAB
ALT SERPL-CCNC: 7 IU/L (ref 7–52)
AST SERPL-CCNC: 18 U/L (ref 13–39)
CREAT SERPL-MCNC: 1.09 MG/DL (ref 0.6–1.3)
GFR SERPL CREATININE-BSD FRML MDRD: 61.4 ML/MIN/1.73M2
GLUCOSE SERPL-MCNC: 106 MG/DL (ref 70–110)
POTASSIUM SERPL-SCNC: 3.9 MMOL/L (ref 3.5–5.1)

## 2018-03-19 ENCOUNTER — HOSPITAL ENCOUNTER (INPATIENT)
Facility: CLINIC | Age: 83
LOS: 2 days | Discharge: HOME HEALTH | DRG: 643 | End: 2018-03-21
Attending: EMERGENCY MEDICINE | Admitting: HOSPITALIST
Payer: MEDICARE

## 2018-03-19 ENCOUNTER — APPOINTMENT (OUTPATIENT)
Dept: CT IMAGING | Facility: CLINIC | Age: 83
DRG: 643 | End: 2018-03-19
Attending: EMERGENCY MEDICINE
Payer: MEDICARE

## 2018-03-19 DIAGNOSIS — R56.9 SEIZURES (H): ICD-10-CM

## 2018-03-19 DIAGNOSIS — C90.00 MULTIPLE MYELOMA, REMISSION STATUS UNSPECIFIED (H): ICD-10-CM

## 2018-03-19 DIAGNOSIS — M62.81 GENERALIZED MUSCLE WEAKNESS: Primary | ICD-10-CM

## 2018-03-19 DIAGNOSIS — E87.1 HYPONATREMIA: ICD-10-CM

## 2018-03-19 LAB
ALBUMIN SERPL-MCNC: 3.2 G/DL (ref 3.4–5)
ALBUMIN UR-MCNC: NEGATIVE MG/DL
ALP SERPL-CCNC: 101 U/L (ref 40–150)
ALT SERPL W P-5'-P-CCNC: 19 U/L (ref 0–70)
ANION GAP SERPL CALCULATED.3IONS-SCNC: 13 MMOL/L (ref 6–17)
ANION GAP SERPL CALCULATED.3IONS-SCNC: 8 MMOL/L (ref 3–14)
APPEARANCE UR: CLEAR
AST SERPL W P-5'-P-CCNC: 21 U/L (ref 0–45)
BASOPHILS # BLD AUTO: 0 10E9/L (ref 0–0.2)
BASOPHILS NFR BLD AUTO: 0.5 %
BILIRUB SERPL-MCNC: 0.3 MG/DL (ref 0.2–1.3)
BILIRUB UR QL STRIP: NEGATIVE
BUN SERPL-MCNC: 18 MG/DL (ref 7–30)
BUN SERPL-MCNC: 19 MG/DL (ref 7–30)
CA-I BLD-SCNC: 4.7 MG/DL (ref 4.4–5.2)
CALCIUM SERPL-MCNC: 8.6 MG/DL (ref 8.5–10.1)
CHLORIDE BLD-SCNC: 88 MMOL/L (ref 94–109)
CHLORIDE SERPL-SCNC: 91 MMOL/L (ref 94–109)
CO2 BLD-SCNC: 24 MMOL/L (ref 20–32)
CO2 SERPL-SCNC: 25 MMOL/L (ref 20–32)
COLOR UR AUTO: ABNORMAL
CREAT BLD-MCNC: 1.1 MG/DL (ref 0.66–1.25)
CREAT SERPL-MCNC: 0.9 MG/DL (ref 0.66–1.25)
CREAT UR-MCNC: 26 MG/DL
DIFFERENTIAL METHOD BLD: ABNORMAL
EOSINOPHIL # BLD AUTO: 0.2 10E9/L (ref 0–0.7)
EOSINOPHIL NFR BLD AUTO: 2.7 %
ERYTHROCYTE [DISTWIDTH] IN BLOOD BY AUTOMATED COUNT: 14.5 % (ref 10–15)
FRACT EXCRET NA UR+SERPL-RTO: 2.7 %
GFR SERPL CREATININE-BSD FRML MDRD: 64 ML/MIN/1.7M2
GFR SERPL CREATININE-BSD FRML MDRD: 81 ML/MIN/1.7M2
GLUCOSE BLD-MCNC: 161 MG/DL (ref 70–99)
GLUCOSE SERPL-MCNC: 165 MG/DL (ref 70–99)
GLUCOSE UR STRIP-MCNC: 50 MG/DL
HCT VFR BLD AUTO: 29.6 % (ref 40–53)
HCT VFR BLD CALC: 31 %PCV (ref 40–53)
HGB BLD CALC-MCNC: 10.5 G/DL (ref 13.3–17.7)
HGB BLD-MCNC: 9.9 G/DL (ref 13.3–17.7)
HGB UR QL STRIP: NEGATIVE
IMM GRANULOCYTES # BLD: 0 10E9/L (ref 0–0.4)
IMM GRANULOCYTES NFR BLD: 0.3 %
INTERPRETATION ECG - MUSE: NORMAL
KETONES UR STRIP-MCNC: NEGATIVE MG/DL
LEUKOCYTE ESTERASE UR QL STRIP: NEGATIVE
LYMPHOCYTES # BLD AUTO: 1.3 10E9/L (ref 0.8–5.3)
LYMPHOCYTES NFR BLD AUTO: 21.7 %
MCH RBC QN AUTO: 31.2 PG (ref 26.5–33)
MCHC RBC AUTO-ENTMCNC: 33.4 G/DL (ref 31.5–36.5)
MCV RBC AUTO: 93 FL (ref 78–100)
MONOCYTES # BLD AUTO: 0.5 10E9/L (ref 0–1.3)
MONOCYTES NFR BLD AUTO: 7.6 %
NEUTROPHILS # BLD AUTO: 4.1 10E9/L (ref 1.6–8.3)
NEUTROPHILS NFR BLD AUTO: 67.2 %
NITRATE UR QL: NEGATIVE
NRBC # BLD AUTO: 0 10*3/UL
NRBC BLD AUTO-RTO: 0 /100
OSMOLALITY SERPL: 265 MMOL/KG (ref 280–301)
OSMOLALITY UR: 294 MMOL/KG (ref 100–1200)
PH UR STRIP: 7 PH (ref 5–7)
PLATELET # BLD AUTO: 209 10E9/L (ref 150–450)
POTASSIUM BLD-SCNC: 4 MMOL/L (ref 3.4–5.3)
POTASSIUM SERPL-SCNC: 3.9 MMOL/L (ref 3.4–5.3)
PROT SERPL-MCNC: 6.8 G/DL (ref 6.8–8.8)
RBC # BLD AUTO: 3.17 10E12/L (ref 4.4–5.9)
RBC #/AREA URNS AUTO: <1 /HPF (ref 0–2)
SODIUM BLD-SCNC: 125 MMOL/L (ref 133–144)
SODIUM SERPL-SCNC: 124 MMOL/L (ref 133–144)
SODIUM SERPL-SCNC: 127 MMOL/L (ref 133–144)
SODIUM UR-SCNC: 81 MMOL/L
SODIUM UR-SCNC: 82 MMOL/L
SOURCE: ABNORMAL
SP GR UR STRIP: 1 (ref 1–1.03)
SQUAMOUS #/AREA URNS AUTO: <1 /HPF (ref 0–1)
TSH SERPL DL<=0.005 MIU/L-ACNC: 3.79 MU/L (ref 0.4–4)
UROBILINOGEN UR STRIP-MCNC: 0 MG/DL (ref 0–2)
WBC # BLD AUTO: 6 10E9/L (ref 4–11)
WBC #/AREA URNS AUTO: 1 /HPF (ref 0–5)

## 2018-03-19 PROCEDURE — 84443 ASSAY THYROID STIM HORMONE: CPT | Performed by: EMERGENCY MEDICINE

## 2018-03-19 PROCEDURE — 96360 HYDRATION IV INFUSION INIT: CPT

## 2018-03-19 PROCEDURE — 83935 ASSAY OF URINE OSMOLALITY: CPT | Performed by: PHYSICIAN ASSISTANT

## 2018-03-19 PROCEDURE — 25000132 ZZH RX MED GY IP 250 OP 250 PS 637: Mod: GY | Performed by: PHYSICIAN ASSISTANT

## 2018-03-19 PROCEDURE — 25000128 H RX IP 250 OP 636: Performed by: EMERGENCY MEDICINE

## 2018-03-19 PROCEDURE — 85025 COMPLETE CBC W/AUTO DIFF WBC: CPT | Performed by: EMERGENCY MEDICINE

## 2018-03-19 PROCEDURE — 84295 ASSAY OF SERUM SODIUM: CPT | Performed by: PHYSICIAN ASSISTANT

## 2018-03-19 PROCEDURE — 84300 ASSAY OF URINE SODIUM: CPT | Performed by: PHYSICIAN ASSISTANT

## 2018-03-19 PROCEDURE — 25000128 H RX IP 250 OP 636: Performed by: PHYSICIAN ASSISTANT

## 2018-03-19 PROCEDURE — 99285 EMERGENCY DEPT VISIT HI MDM: CPT | Mod: 25

## 2018-03-19 PROCEDURE — 83930 ASSAY OF BLOOD OSMOLALITY: CPT | Performed by: EMERGENCY MEDICINE

## 2018-03-19 PROCEDURE — 70450 CT HEAD/BRAIN W/O DYE: CPT

## 2018-03-19 PROCEDURE — 80053 COMPREHEN METABOLIC PANEL: CPT | Performed by: EMERGENCY MEDICINE

## 2018-03-19 PROCEDURE — 80047 BASIC METABLC PNL IONIZED CA: CPT

## 2018-03-19 PROCEDURE — 85014 HEMATOCRIT: CPT

## 2018-03-19 PROCEDURE — A9270 NON-COVERED ITEM OR SERVICE: HCPCS | Mod: GY | Performed by: PHYSICIAN ASSISTANT

## 2018-03-19 PROCEDURE — 81001 URINALYSIS AUTO W/SCOPE: CPT | Performed by: PHYSICIAN ASSISTANT

## 2018-03-19 PROCEDURE — 93005 ELECTROCARDIOGRAM TRACING: CPT

## 2018-03-19 PROCEDURE — 82570 ASSAY OF URINE CREATININE: CPT | Performed by: EMERGENCY MEDICINE

## 2018-03-19 PROCEDURE — 84300 ASSAY OF URINE SODIUM: CPT | Performed by: EMERGENCY MEDICINE

## 2018-03-19 PROCEDURE — 99223 1ST HOSP IP/OBS HIGH 75: CPT | Mod: AI | Performed by: PHYSICIAN ASSISTANT

## 2018-03-19 PROCEDURE — 36415 COLL VENOUS BLD VENIPUNCTURE: CPT | Performed by: PHYSICIAN ASSISTANT

## 2018-03-19 PROCEDURE — 12000007 ZZH R&B INTERMEDIATE

## 2018-03-19 RX ORDER — AMOXICILLIN 250 MG
1 CAPSULE ORAL 2 TIMES DAILY PRN
Status: DISCONTINUED | OUTPATIENT
Start: 2018-03-19 | End: 2018-03-21 | Stop reason: HOSPADM

## 2018-03-19 RX ORDER — ONDANSETRON 4 MG/1
4 TABLET, ORALLY DISINTEGRATING ORAL EVERY 6 HOURS PRN
Status: DISCONTINUED | OUTPATIENT
Start: 2018-03-19 | End: 2018-03-21 | Stop reason: HOSPADM

## 2018-03-19 RX ORDER — HYDROMORPHONE HYDROCHLORIDE 1 MG/ML
0.2 INJECTION, SOLUTION INTRAMUSCULAR; INTRAVENOUS; SUBCUTANEOUS
Status: DISCONTINUED | OUTPATIENT
Start: 2018-03-19 | End: 2018-03-21 | Stop reason: HOSPADM

## 2018-03-19 RX ORDER — PROCHLORPERAZINE MALEATE 5 MG
5 TABLET ORAL EVERY 6 HOURS PRN
Status: DISCONTINUED | OUTPATIENT
Start: 2018-03-19 | End: 2018-03-21 | Stop reason: HOSPADM

## 2018-03-19 RX ORDER — AMLODIPINE BESYLATE 5 MG/1
5 TABLET ORAL DAILY
Status: DISCONTINUED | OUTPATIENT
Start: 2018-03-19 | End: 2018-03-21 | Stop reason: HOSPADM

## 2018-03-19 RX ORDER — SODIUM CHLORIDE 9 MG/ML
1000 INJECTION, SOLUTION INTRAVENOUS CONTINUOUS
Status: ACTIVE | OUTPATIENT
Start: 2018-03-19 | End: 2018-03-20

## 2018-03-19 RX ORDER — HYDRALAZINE HYDROCHLORIDE 20 MG/ML
10 INJECTION INTRAMUSCULAR; INTRAVENOUS EVERY 4 HOURS PRN
Status: DISCONTINUED | OUTPATIENT
Start: 2018-03-19 | End: 2018-03-21 | Stop reason: HOSPADM

## 2018-03-19 RX ORDER — PROCHLORPERAZINE 25 MG
12.5 SUPPOSITORY, RECTAL RECTAL EVERY 12 HOURS PRN
Status: DISCONTINUED | OUTPATIENT
Start: 2018-03-19 | End: 2018-03-21 | Stop reason: HOSPADM

## 2018-03-19 RX ORDER — ONDANSETRON 2 MG/ML
4 INJECTION INTRAMUSCULAR; INTRAVENOUS EVERY 6 HOURS PRN
Status: DISCONTINUED | OUTPATIENT
Start: 2018-03-19 | End: 2018-03-21 | Stop reason: HOSPADM

## 2018-03-19 RX ORDER — NALOXONE HYDROCHLORIDE 0.4 MG/ML
.1-.4 INJECTION, SOLUTION INTRAMUSCULAR; INTRAVENOUS; SUBCUTANEOUS
Status: DISCONTINUED | OUTPATIENT
Start: 2018-03-19 | End: 2018-03-21 | Stop reason: HOSPADM

## 2018-03-19 RX ORDER — IPRATROPIUM BROMIDE AND ALBUTEROL SULFATE 2.5; .5 MG/3ML; MG/3ML
3 SOLUTION RESPIRATORY (INHALATION) EVERY 4 HOURS PRN
Status: DISCONTINUED | OUTPATIENT
Start: 2018-03-19 | End: 2018-03-21 | Stop reason: HOSPADM

## 2018-03-19 RX ORDER — AMOXICILLIN 250 MG
2 CAPSULE ORAL 2 TIMES DAILY PRN
Status: DISCONTINUED | OUTPATIENT
Start: 2018-03-19 | End: 2018-03-21 | Stop reason: HOSPADM

## 2018-03-19 RX ORDER — ACETAMINOPHEN 325 MG/1
650 TABLET ORAL EVERY 4 HOURS PRN
Status: DISCONTINUED | OUTPATIENT
Start: 2018-03-19 | End: 2018-03-21 | Stop reason: HOSPADM

## 2018-03-19 RX ADMIN — AMLODIPINE BESYLATE 5 MG: 5 TABLET ORAL at 17:10

## 2018-03-19 RX ADMIN — ENOXAPARIN SODIUM 40 MG: 40 INJECTION SUBCUTANEOUS at 17:10

## 2018-03-19 RX ADMIN — SODIUM CHLORIDE 1000 ML: 9 INJECTION, SOLUTION INTRAVENOUS at 16:20

## 2018-03-19 RX ADMIN — OMEPRAZOLE 40 MG: 20 CAPSULE, DELAYED RELEASE ORAL at 17:10

## 2018-03-19 RX ADMIN — SODIUM CHLORIDE 1000 ML: 9 INJECTION, SOLUTION INTRAVENOUS at 12:51

## 2018-03-19 ASSESSMENT — ACTIVITIES OF DAILY LIVING (ADL)
ADLS_ACUITY_SCORE: 17
ADLS_ACUITY_SCORE: 17

## 2018-03-19 ASSESSMENT — ENCOUNTER SYMPTOMS: SEIZURES: 1

## 2018-03-19 NOTE — PHARMACY-ADMISSION MEDICATION HISTORY
Admission medication history interview status for this patient is complete. See [facility] admission navigator for allergy information, prior to admission medications and immunization status.     Medication history interview source(s):Patient and Family  Medication history resources (including written lists, pill bottles, clinic record):None  Primary pharmacy[facility]    Changes made to PTA medication list:  Added: omeprazole (only medication patient is currently taking - all others have been stopped)  Deleted: acyclovir, albuterol, amlodipine, aspirin, atorvastatin, flovent, advair, Keppra, lorazepam, metformin, compazine, eye drops  Changed: none    Actions taken by pharmacist (provider contacted, etc):None     Additional medication history information:None    Medication reconciliation/reorder completed by provider prior to medication history? No    Do you take OTC medications (eg tylenol, ibuprofen, fish oil, eye/ear drops, etc)? N(Y/N)    For patients on insulin therapy: N (Y/N)  Lantus/levemir/NPH/Mix 70/30 dose:   (Y/N) (see Med list for doses)   Sliding scale Novolog Y/N  If Yes, do you have a baseline novolog pre-meal dose:  units with meals  Patients eat three meals a day:   Y/N    How many episodes of hypoglycemia do you have per week: _______  How many missed doses do you have per week: ______  How many times do you check your blood glucose per day: _______   Any Barriers to therapy - Be specific :  cost of medications, comfortable with giving injections (if applicable), comfortable and confident with current diabetes regimen: Y/N ______________      Prior to Admission medications    Medication Sig Last Dose Taking? Auth Provider   OMEPRAZOLE PO Take 40 mg by mouth 2 times daily (before meals) 3/19/2018 at am Yes Unknown, Entered By History

## 2018-03-19 NOTE — ED PROVIDER NOTES
History     Chief Complaint:  Seizures    HPI   Franklyn Hein is a 85 year old male with a history of COPD, hypertension, hypercholesteremia, diabetes, AAA and receiving chemotherapy for multiple myeloma that started in the skull who presents today due to possible seizures. The patient has been having intermittent seizures since 9/17/17 and was noted to have a sodium level at 124 five days ago. He was actually at clinic prior to arrival when he had 2 shaking episodes, so they sent him here to the emergency department.     Allergies:  No known drug allergies.     Medications:    fluticasone (FLOVENT HFA) 220 MCG/ACT Inhaler  albuterol (PROAIR HFA/PROVENTIL HFA/VENTOLIN HFA) 108 (90 BASE) MCG/ACT Inhaler  ASPIRIN EC PO  fluticasone-salmeterol (ADVAIR) 250-50 MCG/DOSE diskus inhaler  Tetrahydroz-Polyvinyl Al-Povid (CLEAR EYES TRIPLE ACTION) 0.05-0.5-0.6 % SOLN  Naphazoline-Glycerin-Zinc Sulf (CLEAR EYES MAXIMUM ITCHY EYE) 0.012-0.25-0.25 % SOLN  MetFORMIN HCl (GLUCOPHAGE XR PO)  levETIRAcetam (KEPPRA) 250 MG tablet  PROCHLORPERAZINE MALEATE PO  LORAZEPAM PO  ATORVASTATIN CALCIUM PO  ACYCLOVIR PO  AMLODIPINE BESYLATE PO     Past Medical History:    AAA Anemia  Ataxia  Cancer  COPD  Diabetes  Hypercholesteremia  Hypertension  Multiple myeloma  Tubal adenoma    Past Surgical History:    Appendectomy  Endovascular repair aneurysm abdominal aorta  Cataract phacoemulsification  AAA repair    Family History:    Mother: Diabetes, hyperlipidemia, hypertensoin  Father: Diabetes    Social History:  Smoking Status: Former - Quit 1996  Smokeless Tobacco: Never used  Alcohol Use: Negative  Marital Status:       Review of Systems   Neurological: Positive for seizures.   All other systems reviewed and are negative.      Physical Exam     Patient Vitals for the past 24 hrs:   BP Temp Pulse Heart Rate Resp SpO2   03/19/18 1315 170/90 - - 81 16 99 %   03/19/18 1300 185/79 - - 74 14 100 %   03/19/18 1246 (!) 187/91 97  F  (36.1  C) 77 77 20 100 %   03/19/18 1245 170/79 - - - - -        Physical Exam  Constitutional: Alert, attentive  HENT:    Nose: Nose normal.    Mouth/Throat: Oropharynx is clear, mucous membranes are moist  Eyes: EOM are normal. Pupils are equal, round, and reactive to light.   CV: Regular rate and rhythm, no murmurs, rubs or gallops.  Chest: Effort normal and breath sounds normal.   GI: No distension. There is no tenderness  MSK: Normal range of motion.   Neurological:   GCS 15; A/Ox3; Cranial nerves 2-12 intact;   5/5 strength throughout the upper and lower extremities;   sensation intact to light touch throughout the upper and lower extremities;   2+ DTRs to the bilateral upper and lower extremities (biceps, BRs, patellar, achilles);   normal fine motor coordination intact bilaterally;   No meningismus   Skin: Skin is warm and dry.        Emergency Department Course   ECG:  Indication: Seizures  Time: 1243  Sinus rhythm with 1st degree AV block. Otherwise normal ECG.  Vent. Rate 77 bpm. IA interval 22. QRS duration 110. QT/QTc 396/448. P-R-T axis 77 73 73. Read time: 1245    Imaging:  CT Head w/o Contrast  There is moderate diffuse cerebral volume loss. There are subtle patchy areas of decreased density in the cerebral white matter bilaterally that are consistent with sequela of chronic small vessel ischemic disease.   As per radiology.    Laboratory:  Fractional excretion of sodium: Creatinine 26, Sodium 91, FENA 2.7%  ISTAT Basic Met ICA HCT:  (L), Potassium 4.0, Chloride 88 (L), Total CO2 24, Anion Gap 13, Glucose 161 (H), BUN 19, Creatinine 1.1, GFR 64, Calcium Ionized 4.7, HGB 10.5 (L), Hematocrit 31 (L)  CMP: Glucose 165 (H),  (L), Chloride 91 (L), Albumin 3.2 (L), o/w WNL (Creatinine: 0.90)  CBC: WBC: 6.0, HGB: 9.9 (L), PLT: 209     Interventions:  1251 0.9% Sodium Chloride 1,000 mL IV    Emergency Department Course:  Nursing notes and vitals reviewed.  1238 I performed an exam of the patient  as documented above.  EKG obtained in the ED, see results above.   Blood drawn. This was sent to the lab for further testing, results above.  IV inserted. Medicine administered as documented above.   The patient was sent for a Head CT while in the emergency department, findings above.     1357 I reevaluated the patient and provided an update in regards to his ED course.    1405 I rechecked the patient and him and his family agree to admission.  1420 I consulted with YAKELIN Watts on behalf of hospitalist Dr. Guzman about the patient's symptoms, history and workup here in the emergency department. Dr. Guzman agrees to accept the patient.  1434 I rechecked the patient and discussed movement up to the floor. I answered all their questions.    I personally reviewed the laboratory results with the patient and answered all related questions prior to admission.  Findings and plan explained to the patient and family who consents to admission. Discussed the patient with YAKELIN Watts on behalf of Dr. Guzman, who will admit the patient to a med/surg bed for further monitoring, evaluation, and treatment.      Impression & Plan      Medical Decision Making:  Franklyn Hein is a 85 year old male with history of multiple myeloma and chemotherapy who presents for evaluation of 2 seizures today in clinic, witnessed by family as well.  The patient's family described generalized shaking activity at clinic, similar to an episode he had in September 2017 for which he underwent extensive negative workup aside from mild hyponatremia of mid 120's.  Here he has a normal neurologic exam.  CT scan shows no intracranial hemorrhage or other abnormality.  His sodium today is 124.  In the absence of active seizing or abnormal neurologic exam, hypertonic saline is not indicated.  He did receive a liter of normal saline and will need close serial monitoring of his sodium.  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  He will be placed in the floor with plan for continue supportive cares and close monitoring.    Diagnosis:    ICD-10-CM    1. Seizures (H) R56.9 Fractional excretion of sodium   2. Hyponatremia E87.1    3. Multiple myeloma, remission status unspecified (H) C90.00        Disposition:  Admitted.    Scribe Discloser:  I, Melvin Dixon, am serving as a scribe on 3/19/2018 at 12:38 PM to personally document services performed by Gigi Llamas MD based on my observations and the provider's statements to me.     3/19/2018   [facility] EMERGENCY DEPARTMENT       Gigi Llamas MD  03/19/18 0425

## 2018-03-19 NOTE — ED NOTES
"[facility]  ED Nurse Handoff Report    Franklyn Hein is a 85 year old male   ED Chief complaint: Seizures  . ED Diagnosis:   Final diagnoses:   Seizures (H)   Hyponatremia   Multiple myeloma, remission status unspecified (H)     Allergies: No Known Allergies    Code Status: Full Code  Activity level - Baseline/Home:  Stand with Assist. Activity Level - Current:   Stand with Assist. Lift room needed: No. Bariatric: No   Needed: No   Isolation: No. Infection: Not Applicable.     Vital Signs:   Vitals:    03/19/18 1245 03/19/18 1246 03/19/18 1300 03/19/18 1315   BP: 170/79 (!) 187/91 185/79 170/90   Pulse:  77     Resp:  20 14 16   Temp:  97  F (36.1  C)     SpO2:  100% 100% 99%       Cardiac Rhythm:  ,    NSR  Pain level: 0-10 Pain Scale: 0  Patient confused: No. Patient Falls Risk: Yes.   Seizure precautions  Elimination Status: Has voided   Patient Report - Initial Complaint: pt BIB daughter and wife after 2 reported petit mal seizures and hyponatremia at clinic. Focused Assessment:    12:45 Mar 19 Cardiac Cardiac - Cardiac Comment: NSR; denies chest pain RB    12:45 Mar 19 Neurological Cognitive/Perceptual/Neuro - Cognitive/Neuro/Behavioral WDL:  (pt alert, oriented to person, place, time; pupils PERRL; speech clear; CMS intact x 4; pt has intermittent fine tremors to hands; family states pt had 2 seizures today at clinic where he was \"staring ahead and shaking in his arms and legs\")  Pupils (CN II) - Pupil PERRLA: yes  Seizure Episode - Seizure Interventions: safe environment provided (seizure precautions initiated) RB    12:45 Mar 19 Vitals Device Data - BP: 170/79 (Device Time: 12:45:21)  Other flowsheet entries - BP - Mean: 120 (Device Time: 12:45:21) RB    12:45 Mar 19 Respiratory Respiratory - Respiratory WDL:  (LS clear bilaterally; respirations unlabored) RB       Per MD pt had previous episode of hyponatremic seizures with negative EEG indicating non-neurogenic cause.    Tests " Performed:   CT Head w/o Contrast   Preliminary Result   IMPRESSION: Diffuse cerebral volume loss and cerebral white matter   changes consistent with chronic small vessel ischemic disease. No   evidence for acute intracranial pathology.                       Labs Ordered and Resulted from Time of ED Arrival Up to the Time of Departure from the ED   COMPREHENSIVE METABOLIC PANEL - Abnormal; Notable for the following:        Result Value    Sodium 124 (*)     Chloride 91 (*)     Glucose 165 (*)     Albumin 3.2 (*)     All other components within normal limits   CBC WITH PLATELETS DIFFERENTIAL - Abnormal; Notable for the following:     RBC Count 3.17 (*)     Hemoglobin 9.9 (*)     Hematocrit 29.6 (*)     All other components within normal limits   ISTAT BASIC MET ICA HCT POCT - Abnormal; Notable for the following:     Sodium 125 (*)     Chloride 88 (*)     Glucose 161 (*)     Hemoglobin 10.5 (*)     Hematocrit - POCT 31 (*)     All other components within normal limits   FRACTIONAL EXCRETION OF SODIUM   ISTAT GAS OR ELECTROLYTE NURSING POCT     . Abnormal Results:  See above  Treatments provided: NS bolus  Family Comments: family at bedside; appear extremely concerned about pt; family anxiety calmed by staff.    OBS brochure/video discussed/provided to patient:  No  ED Medications:   Medications   0.9% sodium chloride BOLUS (0 mLs Intravenous Stopped 3/19/18 1326)     Drips infusing:  No  For the majority of the shift, the patient's behavior Green. Interventions performed were n/a.     Severe Sepsis OR Septic Shock Diagnosis Present: No      ED Nurse Name/Phone Number: Zara CARO Mota,   2:21 PM  RECEIVING UNIT ED HANDOFF REVIEW    Above ED Nurse Handoff Report was reviewed: Yes   Reviewed by: Qing Sweeney on March 19, 2018 at 2:52 PM

## 2018-03-19 NOTE — IP AVS SNAPSHOT
[place] [address] [facility]    [address]    [facility] [address]    Phone:  [phone]    Fax:  [phone]                                       After Visit Summary   3/19/2018    Franklyn Hein    MRN: [id number]           After Visit Summary Signature Page     I have received my discharge instructions, and my questions have been answered. I have discussed any challenges I see with this plan with the nurse or doctor.    ..........................................................................................................................................  Patient/Patient Representative Signature      ..........................................................................................................................................  Patient Representative Print Name and Relationship to Patient    ..................................................               ................................................  Date                                            Time    ..........................................................................................................................................  Reviewed by Signature/Title    ...................................................              ..............................................  Date                                                            Time

## 2018-03-19 NOTE — H&P
"History and Physical     Franklyn Hein MRN# [id number]   YOB: 1932 Age: 85 year old      Date of Admission:  3/19/2018    Primary care provider: Melvin Hoskins          Assessment and Plan:   Franklyn Hein is a 85 year old male with a PMH significant for multiple myeloma (started in the skull) s/p chemo tx with Dr Salcedo, HTN, DM, hyperlipidemia (no longer on any of these medication except for PPI), AAA status post repair and previous history of seizure versus syncopal spell (September 2017)  followed by Dr. Hernandez and no longer on AED, who presents from PCP clinic after having 2 episodes of generalized body shaking concerning for recurrent seizures.   Workup in the emergency room reveals mild hyponatremia at 124 (stable from 4 days ago), hgb stable in the upper 9's -10's. CT head was unremarkable. He was given 1L of NS and recommended for admission to the hospital for further evaluation and management of hyponatremia and questionable recurrent seizure.     1. Hyponatremia: Sodium 124 today which is the same as it was in Dr. Clements's office 4 days ago.  When he presented with last fall with possible seizures his sodium at that time was 128.  Not sure of the exact cause of his hyponatremia except for likely poor p.o. intake.  He is not on a diuretic and he has not had any pulmonary complaints to account for SIADH.  We will complete a hyponatremia workup by adding serum and urine osmole, urine sodium.  He is status post 1 L normal saline in the emergency room, continue normal saline at 100 cc an hour and check sodium q. 8.    2.  Recurrent seizure versus \"shaking episodes\" he had follow Dr. Hernandez in the past and was actually taken off of Keppra in October because patient felt that it was contributing to his weakness and weight loss.  Workup at that time was somewhat equivocal with negative EEG negative MRI.  And given that he had no breakthrough seizures he was taken off of it.  He " "apparently since then has had 2 other short lived \"shaking episodes\" In December and January.  Hard to assess if today's episode was truly seizure.  I will get a repeat EEG for now and request neurology evaluation to see if an alternative AED is warranted.  Patient and family is very against resuming Keppra as they are convinced it is causing him to be weak.    3.  Hypertension-patient has taken himself off of all of his medications.  Formerly on 10 mg  of lisinopril and 2.5 mg of Norvasc.  Will hold off lisinopril for now and resume f Norvasc for blood pressure.    4.  Multiple myeloma: Follows Dr. Salcedo.  Formerly on Velcade and Decadron therapy.  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  Otherwise doing well.  Follow-up with Dr. Salcedo this Friday.     5.  Diabetes mellitus-no longer on Metformin his blood sugar is 161 today.  Will check his blood glucose once a day.  Sliding scale insulin if needed.    6.  COPD-stable from a pulmonary standpoint.  Again he took himself off of his inhalers.  We will add as needed nebs if needed.    7.  Chronic anemia-hemoglobin at baseline.  Was getting Aranesp previously with chemo.  Continue to monitor.    8.  Progressive weakness-I think this is multifactorial mostly related to cancer and general deconditioning.  Continue IV fluids as above as well as Ensure supplement.  Will have PT and social work evaluate the patient for discharge needs.    9.  Complaints of occasional dysphasia-patient does have history of GERD and he takes a PPI.  He has been complaining of food being stuck sensation along with choking on water occasionally.  Will ask speech to do bedside evaluation.    10.  Left eye pain-states he has had intermittent eye pain due to past retinal hemorrhage.  This had been followed by an ophthalmologist at [place].  Will continue Tylenol, have added as needed IV pain meds.  Currently has no visual deficit to necessitate " immediate ophthalmology evaluation.  He can follow-up as an outpatient.    11. Admit to inpatient status as I expect more than 2 midnight stay for seizure eval/work up with slow fluid hydration and work up for hyponatremia.     12. Full Code d/w pt.   13. DVT prophylaxis: sc lovenox                Chief Complaint:   Shaking episodes, recurrent seizures.          History of Present Illness:   Franklyn Hein is a 85 year old male with a PMH significant for multiple myeloma (started in the skull) s/p chemo tx with Dr Salcedo, HTN, DM, hyperlipidemia (no longer on any of these medication except for PPI), AAA status post repair and previous history of seizure versus syncopal spell (September 2017)  followed by Dr. Hernandez and no longer on AED, who presents from PCP clinic after having 2 episodes of generalized body shaking concerning for recurrent seizures.  History is obtained by speaking with the ER physician patient interview and chart review.   Patient states that he was at Dr. Clements's office 4 days ago with repeat labs and was told that his sodium level was low at 124.  He was told to follow-up with his PCP where he was at this morning.  When he arrived to the clinic he had one episode of generalized shaking while he was in the wheelchair.  His family described it as him staring forward with general arm and leg shaking that lasted maybe 30 seconds.  He seemed a little bit confused afterwards there was no obvious incontinence.  He did not remember the actual shaking episode but knew that he was at the doctor's office.  And within 10 minutes time he had another episode.  This was witnessed by his PCP Dr. Hoskins and he was recommended to come into the emergency room for further evaluation.    He was last seen in September 2013 for episode of seizure versus syncopal spell.  At that time he also had urinary incontinence.  This is also in the setting of getting treatment for his multiple myeloma with Decadron and Velcade  "therapy.  His sodium level was 128 at the time.  He was seen by Dr. Hernandez of neurology.  He underwent MRI and EEG that were fairly unremarkable.  It was unclear whether he truly had a epileptic episode but favored putting him on low-dose AED.  He then saw Dr. Hernandez in follow-up and wanted to discontinue the Keppra as he was convinced it was causing him to lose weight and become weak.  He has been off of Keppra since October and since then he has had 2 episodes of \"shaking seizure activity\" in December and January and not until this morning.    Workup in the emergency room reveals mild hyponatremia at 124 (stable from 4 days ago), hgb stable in the upper 9's -10's. CT head was unremarkable. He was given 1L of NS and recommended for admission to the hospital for further evaluation and management of hyponatremia and questionable recurrent seizure.                Past Medical History:     Past Medical History:   Diagnosis Date     AAA (abdominal aortic aneurysm) (H)      Anemia     macrocytic     Ataxia      Cancer (H)      COPD (chronic obstructive pulmonary disease) (H)      Diabetes (H)     type 2     Dyspnea      Fatigue      Hypercholesteremia      Hypertension      Multiple myeloma (H)      Nocturia      Pulmonary fibrosis (H)      Tubular adenoma 2007               Past Surgical History:     Past Surgical History:   Procedure Laterality Date     APPENDECTOMY       COLONOSCOPY       ENDOVASCULAR REPAIR ANEURYSM ABDOMINAL AORTA N/A 9/29/2017    Procedure: ENDOVASCULAR REPAIR ANEURYSM ABDOMINAL AORTA;  EMBOLIZATION LEFT INTERNAL ILIAC ARTERY WITH AMPLATZER PLUG; ENDOVASCULAR ABDOMINAL AORTIC ANEURYMS REPAIR;  Surgeon: Juanito Landaverde MD;  Location:  OR     ESOPHAGOSCOPY, GASTROSCOPY, DUODENOSCOPY (EGD), COMBINED N/A 10/30/2017    Procedure: COMBINED ESOPHAGOSCOPY, GASTROSCOPY, DUODENOSCOPY (EGD), BIOPSY SINGLE OR MULTIPLE;  ESOPHAGOSCOPY, GASTROSCOPY, DUODENOSCOPY (EGD)  with esophageal bxs;  Surgeon: " "Adilene Phipps MD;  Location:  GI     EYE SURGERY      Cataract Phacoemulsification     VASCULAR SURGERY  2017    AAA Aortic Aneurysm Repair               Social History:     Social History     Social History     Marital status:      Spouse name: N/A     Number of children: N/A     Years of education: N/A     Occupational History     Not on file.     Social History Main Topics     Smoking status: Former Smoker     Types: Cigarettes     Quit date: 9/28/1996     Smokeless tobacco: Never Used     Alcohol use No      Comment: none currently     Drug use: Yes     Sexual activity: No     Other Topics Concern     Not on file     Social History Narrative               Family History:     Family History   Problem Relation Age of Onset     DIABETES Mother      Hyperlipidemia Mother      Hypertension Mother      DIABETES Father      DIABETES Brother      DIABETES Sister               Allergies:    No Known Allergies            Medications:     Prior to Admission medications    Medication Sig Last Dose Taking? Auth Provider   OMEPRAZOLE PO Take 40 mg by mouth 2 times daily (before meals) 3/19/2018 at am Yes Unknown, Entered By History              Review of Systems:   A Comprehensive greater than 10 system review of systems was carried out.  Pertinent positives and negatives are noted above.  Otherwise negative for contributory information.   No recent fevers chills no nausea vomiting no complaints of chest pain shortness of breath.  He does complain of intermittent eye pain that has been followed by an ophthalmologist.         Physical Exam:   Blood pressure 178/76, pulse 77, temperature 97  F (36.1  C), resp. rate 16, height 1.803 m (5' 11\"), weight 51.6 kg (113 lb 11.2 oz), SpO2 100 %.  Exam:  GENERAL:  Comfortable.  Frail and cachectic appearing  PSYCH: pleasant, oriented, No acute distress.  HEENT:  PERRLA. Normal conjunctiva, normal hearing, nasal mucosa and Oropharynx are normal.  Poor dentition.  Dry " appearing  NECK:  Supple, no neck vein distention, adenopathy or bruits, normal thyroid.  HEART:  Normal S1, S2 with no murmur, no pericardial rub, gallops or S3 or S4.  LUNGS:  Clear to auscultation, normal Respiratory effort. No wheezing, rales or ronchi.  ABDOMEN:  Soft, no hepatosplenomegaly, normal bowel sounds. Non-tender, non distended.   EXTREMITIES:  No pedal edema, +2 pulses bilateral and equal.  SKIN:  Dry to touch, No rash, wound or ulcerations.  NEUROLOGIC:  CN 2-12 intact, BL 5/5 symmetric upper and lower extremity strength, sensation is intact with no focal deficits.           Data:       Recent Labs  Lab 03/19/18  1250 03/19/18  1243   WBC  --  6.0   HGB 10.5* 9.9*   HCT  --  29.6*   MCV  --  93   PLT  --  209       Recent Labs  Lab 03/19/18  1250 03/19/18  1243   * 124*   POTASSIUM 4.0 3.9   CHLORIDE 88* 91*   CO2  --  25   ANIONGAP 13 8   * 165*   BUN 19 18   CR  --  0.90   GFRESTIMATED 64 81   GFRESTBLACK 77 >90   HAO  --  8.6       Results for orders placed or performed during the hospital encounter of 03/19/18   CT Head w/o Contrast    Narrative    CT OF THE HEAD WITHOUT CONTRAST March 19, 2018 1:47 PM     HISTORY: Seizure.     TECHNIQUE: 5 mm thick axial CT images of the head were acquired  without IV contrast material. Radiation dose for this scan was reduced  using automated exposure control, adjustment of the mA and/or kV  according to patient size, or iterative reconstruction technique.    COMPARISON: Brain MRI 9/11/2017.    FINDINGS: There is moderate diffuse cerebral volume loss. There are  subtle patchy areas of decreased density in the cerebral white matter  bilaterally that are consistent with sequela of chronic small vessel  ischemic disease.     The ventricles and basal cisterns are within normal limits in  configuration given the degree of cerebral volume loss. There is no  midline shift. There are no extra-axial fluid collections.     No intracranial hemorrhage, mass  or recent infarct.    The visualized paranasal sinuses are well-aerated. There is no  mastoiditis. There are no fractures of the visualized bones.      Impression    IMPRESSION: Diffuse cerebral volume loss and cerebral white matter  changes consistent with chronic small vessel ischemic disease. No  evidence for acute intracranial pathology.               MD Ximena DONOVAN PA-C    This patient was seen and discussed with Dr. Guzman who agrees with the current plans as outlined above.

## 2018-03-19 NOTE — IP AVS SNAPSHOT
MRN:[id number]                      After Visit Summary   3/19/2018    Franklyn Hein    MRN: [id number]           Thank you!     Thank you for choosing [facility] for your care. Our goal is always to provide you with excellent care. Hearing back from our patients is one way we can continue to improve our services. Please take a few minutes to complete the written survey that you may receive in the mail after you visit. If you would like to speak to someone directly about your visit please contact Patient Relations at [phone]. Thank you!          Patient Information     Date Of Birth          10/28/1932        About your hospital stay     You were admitted on:  March 19, 2018 You last received care in the:  [place] [address] [facility]    You were discharged on:  March 21, 2018       Who to Call     For medical emergencies, please call 911.  For non-urgent questions about your medical care, please call your primary care provider or clinic, [phone]          Attending Provider     Provider Specialty    Gigi Llamas MD Emergency Medicine    Thomas, Gigi Pfeiffer DO Internal Medicine       Primary Care Provider Office Phone # Fax #    Melvin Hoskins [phone]      After Care Instructions     Activity       Your activity upon discharge: activity as tolerated            Diet       Follow this diet upon discharge: Orders Placed This Encounter           Dysphagia Diet Level 3 Advanced Nectar Thickened Liquids (pre-thickened or use instant food thickener)                  Follow-up Appointments     Follow-up and recommended labs and tests        1)  Recommend lab recheck with either Dr. Salcedo or Dr. Hoskins within the next 5-7 days to follow up on sodium level.    2)  Your low sodium level appears to be from SIADH (your body inappropriately not excreting water properly).  Treatment for this is limiting your fluid intake.  Recommend limiting fluid  "intake to 1 to 1.5 liters a day to help prevent low sodium levels  3)  Recommend follow up with Dr. Hernandez of neurology to further evaluate shaking episodes and rule out seizures. Call [phone] to make an appointment  4)  No driving                  Additional Services     Home Care PT Referral for Hospital Discharge       PT to eval and treat    Your provider has ordered home care - physical therapy. If you have not been contacted within 2 days of your discharge please call the department phone number listed on the top of this document.                  Pending Results     No orders found from 3/17/2018 to 3/20/2018.            Statement of Approval     Ordered          18 1549  I have reviewed and agree with all the recommendations and orders detailed in this document.  EFFECTIVE NOW     Approved and electronically signed by:  Marshall Franco MD             Admission Information     Date & Time Provider Department Dept. Phone    3/19/2018 Gigi Guzman DO [place] [address] [facility] [phone]      Your Vitals Were     Blood Pressure Pulse Temperature Respirations Height Weight    130/59 (BP Location: Right arm) 70 97.1  F (36.2  C) (Axillary) 17 1.803 m (5' 11\") 51.6 kg (113 lb 11.2 oz)    Pulse Oximetry BMI (Body Mass Index)                98% 15.86 kg/m2          [facility] Information     [facility] lets you send messages to your doctor, view your test results, renew your prescriptions, schedule appointments and more. To sign up, go to [email or web address] . Click on \"Log in\" on the left side of the screen, which will take you to the Welcome page. Then click on \"Sign up Now\" on the right side of the page.     You will be asked to enter the access code listed below, as well as some personal information. Please follow the directions to create your username and password.     Your access code is: [email or web address]  Expires: 3/26/2018  5:56 PM     Your access code will  in 90 days. " If you need help or a new code, please call your [facility] clinic or [phone].        Care EveryWhere ID     This is your Care EveryWhere ID. This could be used by other organizations to access your [facility] medical records  [id number]        Equal Access to Services     LO FRYE : Hadii aad ku hadaureliocarlee Sotrent, wapoornimada luqadaha, qaybta kaalmada adenikolescooby, klever rubiojasruddy vale. So [facility] [phone].    ATENCIÓN: Si habla español, tiene a bartlett disposición servicios gratuitos de asistencia lingüística. Llame al [phone].    We comply with applicable federal civil rights laws and [place] laws. We do not discriminate on the basis of race, color, national origin, age, disability, sex, sexual orientation, or gender identity.               Review of your medicines      CONTINUE these medicines which have NOT CHANGED        Dose / Directions    OMEPRAZOLE PO        Dose:  40 mg   Take 40 mg by mouth 2 times daily (before meals)   Refills:  0                Protect others around you: Learn how to safely use, store and throw away your medicines at www.disposemymeds.org.             Medication List: This is a list of all your medications and when to take them. Check marks below indicate your daily home schedule. Keep this list as a reference.      Medications           Morning Afternoon Evening Bedtime As Needed    OMEPRAZOLE PO   Take 40 mg by mouth 2 times daily (before meals)   Last time this was given:  40 mg on 3/21/2018  6:10 AM

## 2018-03-19 NOTE — ED NOTES
"Patient had two \"petit mal seizures at clinic today\" witnessed by family. ABC intact alert and no distress.   "

## 2018-03-20 ENCOUNTER — APPOINTMENT (OUTPATIENT)
Dept: PHYSICAL THERAPY | Facility: CLINIC | Age: 83
DRG: 643 | End: 2018-03-20
Attending: PHYSICIAN ASSISTANT
Payer: MEDICARE

## 2018-03-20 ENCOUNTER — APPOINTMENT (OUTPATIENT)
Dept: SPEECH THERAPY | Facility: CLINIC | Age: 83
DRG: 643 | End: 2018-03-20
Payer: MEDICARE

## 2018-03-20 LAB
ANION GAP SERPL CALCULATED.3IONS-SCNC: 6 MMOL/L (ref 3–14)
BUN SERPL-MCNC: 15 MG/DL (ref 7–30)
CALCIUM SERPL-MCNC: 8.2 MG/DL (ref 8.5–10.1)
CHLORIDE SERPL-SCNC: 96 MMOL/L (ref 94–109)
CO2 SERPL-SCNC: 25 MMOL/L (ref 20–32)
CREAT SERPL-MCNC: 0.84 MG/DL (ref 0.66–1.25)
ERYTHROCYTE [DISTWIDTH] IN BLOOD BY AUTOMATED COUNT: 14.5 % (ref 10–15)
GFR SERPL CREATININE-BSD FRML MDRD: 87 ML/MIN/1.7M2
GLUCOSE BLDC GLUCOMTR-MCNC: 116 MG/DL (ref 70–99)
GLUCOSE SERPL-MCNC: 99 MG/DL (ref 70–99)
HCT VFR BLD AUTO: 27.4 % (ref 40–53)
HGB BLD-MCNC: 9.3 G/DL (ref 13.3–17.7)
MCH RBC QN AUTO: 31.3 PG (ref 26.5–33)
MCHC RBC AUTO-ENTMCNC: 33.9 G/DL (ref 31.5–36.5)
MCV RBC AUTO: 92 FL (ref 78–100)
PLATELET # BLD AUTO: 205 10E9/L (ref 150–450)
POTASSIUM SERPL-SCNC: 4 MMOL/L (ref 3.4–5.3)
RBC # BLD AUTO: 2.97 10E12/L (ref 4.4–5.9)
SODIUM SERPL-SCNC: 127 MMOL/L (ref 133–144)
WBC # BLD AUTO: 5.1 10E9/L (ref 4–11)

## 2018-03-20 PROCEDURE — 25000128 H RX IP 250 OP 636: Performed by: PHYSICIAN ASSISTANT

## 2018-03-20 PROCEDURE — 36415 COLL VENOUS BLD VENIPUNCTURE: CPT | Performed by: PHYSICIAN ASSISTANT

## 2018-03-20 PROCEDURE — 00000146 ZZHCL STATISTIC GLUCOSE BY METER IP

## 2018-03-20 PROCEDURE — 97110 THERAPEUTIC EXERCISES: CPT | Mod: GP | Performed by: PHYSICAL THERAPIST

## 2018-03-20 PROCEDURE — 97116 GAIT TRAINING THERAPY: CPT | Mod: GP | Performed by: PHYSICAL THERAPIST

## 2018-03-20 PROCEDURE — 25000128 H RX IP 250 OP 636: Performed by: INTERNAL MEDICINE

## 2018-03-20 PROCEDURE — 92610 EVALUATE SWALLOWING FUNCTION: CPT | Mod: GN

## 2018-03-20 PROCEDURE — 40000193 ZZH STATISTIC PT WARD VISIT: Performed by: PHYSICAL THERAPIST

## 2018-03-20 PROCEDURE — 85027 COMPLETE CBC AUTOMATED: CPT | Performed by: PHYSICIAN ASSISTANT

## 2018-03-20 PROCEDURE — 25000132 ZZH RX MED GY IP 250 OP 250 PS 637: Mod: GY | Performed by: PHYSICIAN ASSISTANT

## 2018-03-20 PROCEDURE — 12000007 ZZH R&B INTERMEDIATE

## 2018-03-20 PROCEDURE — 80048 BASIC METABOLIC PNL TOTAL CA: CPT | Performed by: PHYSICIAN ASSISTANT

## 2018-03-20 PROCEDURE — 97530 THERAPEUTIC ACTIVITIES: CPT | Mod: GP | Performed by: PHYSICAL THERAPIST

## 2018-03-20 PROCEDURE — A9270 NON-COVERED ITEM OR SERVICE: HCPCS | Mod: GY | Performed by: PHYSICIAN ASSISTANT

## 2018-03-20 PROCEDURE — 97161 PT EVAL LOW COMPLEX 20 MIN: CPT | Mod: GP | Performed by: PHYSICAL THERAPIST

## 2018-03-20 PROCEDURE — 92526 ORAL FUNCTION THERAPY: CPT | Mod: GN

## 2018-03-20 PROCEDURE — 95816 EEG AWAKE AND DROWSY: CPT

## 2018-03-20 PROCEDURE — 99232 SBSQ HOSP IP/OBS MODERATE 35: CPT | Performed by: INTERNAL MEDICINE

## 2018-03-20 PROCEDURE — 40000225 ZZH STATISTIC SLP WARD VISIT

## 2018-03-20 PROCEDURE — 99207 ZZC CDG-MDM COMPONENT: MEETS LOW - DOWN CODED: CPT | Performed by: INTERNAL MEDICINE

## 2018-03-20 RX ADMIN — ENOXAPARIN SODIUM 40 MG: 40 INJECTION SUBCUTANEOUS at 16:18

## 2018-03-20 RX ADMIN — AMLODIPINE BESYLATE 5 MG: 5 TABLET ORAL at 08:30

## 2018-03-20 RX ADMIN — OMEPRAZOLE 40 MG: 20 CAPSULE, DELAYED RELEASE ORAL at 16:18

## 2018-03-20 RX ADMIN — SODIUM CHLORIDE 1000 ML: 9 INJECTION, SOLUTION INTRAVENOUS at 11:51

## 2018-03-20 RX ADMIN — OMEPRAZOLE 40 MG: 20 CAPSULE, DELAYED RELEASE ORAL at 06:16

## 2018-03-20 ASSESSMENT — ACTIVITIES OF DAILY LIVING (ADL)
ADLS_ACUITY_SCORE: 19
ADLS_ACUITY_SCORE: 18
ADLS_ACUITY_SCORE: 17
ADLS_ACUITY_SCORE: 18
ADLS_ACUITY_SCORE: 18
ADLS_ACUITY_SCORE: 19

## 2018-03-20 NOTE — PLAN OF CARE
Problem: Patient Care Overview  Goal: Plan of Care/Patient Progress Review  Discharge Planner SLP   Patient plan for discharge: none stated  Current status: Bedside swallow eval completed today. Pt presents with mild-moderate oropharyngeal dysphagia in the setting of generalized weakness. Thin liquids resulted in overt s/sx of aspiration marked by throat clearing. Recommend pt continue dysphagia diet 3 and downgrade to nectar thick liquids. Pt should be fully upright for all PO, take small single sips/bites, slow pacing, double swallow, and alternate between consistencies.   Barriers to return to prior living situation: dysphagia   Recommendations for discharge: home with home ST for dysphagia   Rationale for recommendations: pt would benefit from continued ST targeting diet tolerance, advanced trials as appropriate, and to complete oropharyngeal exercises to minimize aspiration risk       Entered by: Ludivina Musa 03/20/2018 10:57 AM

## 2018-03-20 NOTE — CONSULTS
"CLINICAL NUTRITION SERVICES  -  BRIEF ASSESSMENT NOTE    REASON FOR ASSESSMENT  Franklyn Hein is a 85 year old male seen by Registered Dietitian for Admission Nutrition Risk Screen - unintentional weight loss of 10# or more in the past 2 mos. and Provider Order - \"supplements\"    NUTRITION ASSESSMENT  Unable to complete assessment due to pt unavailable and out of room during attempts to visit.   - Note underweight BMI  - SLP following, DD3 + Nectar thick liquid diet order    MONITORING AND EVALUATION:  RD will follow up in the next 1-2 days as schedule allows for full assessment.       Tamra Saavedra RD, LD  3rd floor/ICU: [phone]  All other floors: [phone]  Weekend/holiday: [phone]  Office: [phone]            "

## 2018-03-20 NOTE — PLAN OF CARE
Problem: Seizure Disorder/Epilepsy (Adult)  Goal: Signs and Symptoms of Listed Potential Problems Will be Absent, Minimized or Managed (Seizure Disorder/Epilepsy)  Signs and symptoms of listed potential problems will be absent, minimized or managed by discharge/transition of care (reference Seizure Disorder/Epilepsy (Adult) CPG).   Outcome: No Change  Ao, vss. neuros intact. Up sba. IVF infusing

## 2018-03-20 NOTE — PROGRESS NOTES
03/20/18 1038   General Information   Onset Date 03/19/18   Start of Care Date 03/20/18   Referring Physician Ximena Watts PA-C   Patient Profile Review/OT: Additional Occupational Profile Info See Profile for full history and prior level of function   Patient/Family Goals Statement Pt did not state   Swallowing Evaluation Bedside swallow evaluation   Behaviorial Observations WFL (within functional limits)   Mode of current nutrition Oral diet   Type of oral diet Dysphagia diet level 3;Thin liquid   Respiratory Status O2 Supply   Type of O2 supply Nasal cannula   Comments Franklyn Hein is a 85 year old male with a PMH significant for multiple myeloma (started in the skull) s/p chemo tx, HTN, DM, hyperlipidemia, AAA status post repair and previous history of seizure versus syncopal spell (September 2017), who presents from PCP clinic after having 2 episodes of generalized body shaking concerning for recurrent seizures.  Pt dx with hyponatremia and questionable recurrent seizures.  Head CT was unremarkable. Per discussion with daughter, pt with esophagitis which resulted in food getting stuck in his throat and he coughs with liquids. Pt also with hx of esophageal strictures, however most recent EGD did not reveal stricture. Pts daughter states pt avoids dry food and has an easier time swallowing thicker liquids like ensure. Bedside swallow eval completed per MD orders to further assess oropharyngeal swallow function.    Clinical Swallow Evaluation   Oral Musculature generally intact   Structural Abnormalities none present   Dentition upper and lower dentures;uses dentures to eat   Mucosal Quality adequate   Mandibular Strength and Mobility intact   Oral Labial Strength and Mobility WFL   Lingual Strength and Mobility WFL   Velar Elevation intact   Buccal Strength and Mobility intact   Laryngeal Function Cough;Throat clear;Swallow;Voicing initiated   Additional Documentation Yes   Clinical Swallow Eval:  Thin Liquid Texture Trial   Mode of Presentation, Thin Liquids cup;self-fed   Volume of Liquid or Food Presented 4 oz   Oral Phase of Swallow Premature pharyngeal entry   Pharyngeal Phase of Swallow impaired;throat clearing   Diagnostic Statement thin liquids via cup resulted in consistent overt s/sx of aspiration marked by throat clearing   Clinical Swallow Eval: Nectar Thick Liquid Texture Trial   Mode of Presentation, Nectar cup;self-fed   Volume of Nectar Presented 4 oz   Oral Phase, [place] WFL   Pharyngeal Phase, [place] intact   Diagnostic Statement Pt tolerated nectar thick liquids via cup with no overt s/sx of aspiration   Clinical Swallow Eval: Puree Solid Texture Trial   Mode of Presentation, Puree spoon;self-fed   Volume of Puree Presented 3 tbsp   Oral Phase, Puree WFL   Pharyngeal Phase, Puree intact   Diagnostic Statement Pt tolerated pureed textures via spoon with no overt s/sx of aspiration    Clinical Swallow Eval: Solid Food Texture Trial   Mode of Presentation, Solid self-fed   Volume of Solid Food Presented 2 tbsp   Oral Phase, Solid other (see comments)  (mldly prolonged but functional time for mastication)   Pharyngeal Phase, Solid impaired;feeling of something stuck in throat   Diagnostic Statement regular solid textures resulted in mildly prolonged but functional time for mastication. No overt s/sx of aspiration, however pt with c/o PO sticking in throat. Pt reported some relief with use of liquid wash   VFSS Evaluation   VFSS Additional Documentation No   FEES Evaluation   Additional Documentation No   Swallow Compensations   Swallow Compensations Alternate viscosity of consistencies;Effortful swallow;Pacing;Reduce amounts;Multiple swallow   Results Suspect aspiration   Esophageal Phase of Swallow   Patient reports or presents with symptoms of esophageal dysphagia Yes   Esophageal comments Per daughter, pt with esophagitis   General Therapy Interventions   Planned Therapy Interventions  Dysphagia Treatment   Dysphagia treatment Compensatory strategies for swallowing;Instruction of safe swallow strategies;Modified diet education   Swallow Eval: Clinical Impressions   Skilled Criteria for Therapy Intervention Skilled criteria met.  Treatment indicated.   Functional Assessment Scale (FAS) 4   Treatment Diagnosis mild-moderate oropharyngeal dysphagia   Diet texture recommendations Dysphagia diet level 3;Nectar thick liquids   Recommended Feeding/Eating Techniques alternate between small bites and sips of food/liquid;check mouth frequently for oral residue/pocketing;hard swallow w/ each bite or sip;maintain upright posture during/after eating for 30 mins;small sips/bites   Demonstrates Need for Referral to Another Service occupational therapy;physical therapy;dietitian   Therapy Frequency 5 times/wk   Predicted Duration of Therapy Intervention (days/wks) 1 week   Anticipated Discharge Disposition home w/ home health   Risks and Benefits of Treatment have been explained. Yes   Patient, family and/or staff in agreement with Plan of Care Yes   Clinical Impression Comments SLP: Bedside swallow eval completed per MD orders. Pt presents with mild-moderate oropharyngeal dysphagia in the setting of generalized weakness. Thin liquids resulted in overt s/sx of aspiration marked by throat clearing. Pt tolerated nectar thick liquids via cup, pureed textures, and regular solid textures with no overt s/sx of aspiration. Regular solid textures resulted in intermittent c/o of suspected pharyngeal residuals which pt reported some relief with use of liquid wash. Recommend pt continue dysphagia diet 3 and downgrade to nectar thick liquids. Pt should be fully upright for all PO, take small single sips/bites, slow pacing, double swallow, and alternate between consistencies. ST to continue to follow for diet tolerance and advanced trials as appropriate. Pt would benefit from ongoing ST for dysphagia upon d/c home.    Total  Evaluation Time   Total Evaluation Time (Minutes) 12

## 2018-03-20 NOTE — PROGRESS NOTES
"[facility]  Hospitalist Progress Note  Marshall Franco MD 03/20/2018    Reason for Stay (Diagnosis): shaking, ? Seizure, hyponatremia         Assessment and Plan:      Summary of Stay: Franklyn Hein is a 85 year old male with a PMH significant for multiple myeloma (started in the skull) s/p chemo tx with Dr Salcedo, HTN, DM, hyperlipidemia (no longer on any of these medication except for PPI), AAA status post repair and previous history of seizure versus syncopal spell (September 2017)  followed by Dr. Hernandez and no longer on AED, who presents from PCP clinic after having 2 episodes of generalized body shaking concerning for recurrent seizures.   Workup in the emergency room reveals mild hyponatremia at 124 (stable from 4 days ago), hgb stable in the upper 9's -10's. CT head was unremarkable. He was given 1L of NS and recommended for admission to the hospital for further evaluation and management of hyponatremia and questionable recurrent seizure.  Sodium has modestly improved with IVF     1. Hyponatremia: Sodium 124 on admit which is the same as it was in Dr. Clements's office 4 days ago.  improved to 127 today with IVF.  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  Continue gentle IVF overnight     2.  Recurrent seizure versus \"shaking episodes\" he had follow Dr. Hernandez in the past and was actually taken off of Keppra in October because patient felt that it was contributing to his weakness and weight loss.  Workup at that time was somewhat equivocal with negative EEG negative MRI.  And given that he had no breakthrough seizures he was taken off of it.  He apparently since then has had 2 other short lived \"shaking episodes\" In December and January.  Hard to assess if today's episode was truly seizure.  ordered a repeat EEG for now and request neurology evaluation to see if an alternative AED is warranted.  Patient and family is against resuming Keppra as they are " convinced it is causing him to be weak.     3.  Hypertension-patient has taken himself off of all of his medications.  Formerly on 10 mg  of lisinopril and 2.5 mg of Norvasc.  Will hold off lisinopril for now and resume f Norvasc for blood pressure.     4.  Multiple myeloma: Follows Dr. Salcedo.  Formerly on Velcade and Decadron therapy.  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  Otherwise doing well.  Follow-up with Dr. Salcedo this Friday.      5.  Diabetes mellitus-no longer on Metformin his blood sugar is 161 today.  Will check his blood glucose once a day.  Sliding scale insulin if needed.     6.  COPD-stable from a pulmonary standpoint.  Again he took himself off of his inhalers.  We will add as needed nebs if needed.     7.  Chronic anemia-hemoglobin at baseline.  Was getting Aranesp previously with chemo.  Continue to monitor.     8.  Progressive weakness-I think this is multifactorial mostly related to cancer and general deconditioning.  Continue IV fluids as above as well as Ensure supplement.  Will have PT and social work evaluate the patient for discharge needs.     9.  Complaints of occasional dysphasia-patient does have history of GERD and he takes a PPI.  He has been complaining of food being stuck sensation along with choking on water occasionally.  Will ask speech to do bedside evaluation.     10.  Left eye pain-states he has had intermittent eye pain due to past retinal hemorrhage.  This had been followed by an ophthalmologist at [place].  Will continue Tylenol, have added as needed IV pain meds.  Currently has no visual deficit to necessitate immediate ophthalmology evaluation.  He can follow-up as an outpatient.    # Pain Assessment:   Current Pain Score 3/20/2018 3/20/2018 3/19/2018   Patient currently in pain? denies denies denies   Pain score (0-10) 0 0 0   Pain location - - -   Pain descriptors - - -   Franklyn s pain level was assessed and he  "currently denies pain.        DVT Prophylaxis: Enoxaparin (Lovenox) SQ  Code Status: Full Code  Discharge Dispo: home  Estimated Disch Date / # of Days until Disch: tomorrow        Interval History (Subjective):      No complaints.  No recurrent shaking events                  Physical Exam:      Last Vital Signs:  /65 (BP Location: Right arm)  Pulse 77  Temp 97.1  F (36.2  C) (Oral)  Resp 18  Ht 1.803 m (5' 11\")  Wt 51.6 kg (113 lb 11.2 oz)  SpO2 97%  BMI 15.86 kg/m2      Intake/Output Summary (Last 24 hours) at 03/20/18 1523  Last data filed at 03/20/18 1456   Gross per 24 hour   Intake             2542 ml   Output             2525 ml   Net               17 ml       Constitutional: Awake, alert, cooperative, no apparent distress.  Wife present   Respiratory: Clear to auscultation bilaterally, no crackles or wheezing   Cardiovascular: Regular rate and rhythm, normal S1 and S2, and no murmur noted   Abdomen: Normal bowel sounds, soft, non-distended, non-tender   Skin: No rashes, no cyanosis, dry to touch   Neuro: Alert and oriented x3, no weakness, numbness, memory loss.  Non-focal exam   Extremities: No edema, normal range of motion   Other(s):        All other systems: Negative          Medications:      All current medications were reviewed with changes reflected in problem list.         Data:      All new lab and imaging data was reviewed.   Labs:    Recent Labs  Lab 03/20/18  0616   WBC 5.1   HGB 9.3*   HCT 27.4*   MCV 92         Imaging:   No results found for this or any previous visit (from the past 24 hour(s)).  "

## 2018-03-20 NOTE — PROGRESS NOTES
03/20/18 0800   Quick Adds   Type of Visit Initial PT Evaluation   Living Environment   Lives With spouse   Living Arrangements house   Home Accessibility stairs to enter home;stairs within home   Number of Stairs to Enter Home 2   Number of Stairs Within Home 12  (to basement - pt doesn't use)   Transportation Available car;family or friend will provide   Living Environment Comment Pt lives in a rambler with a basement that only his wife accesses.  Wife is home most of the time to assist pt as needed.  Wife leaves pt alone for brief periods to go shopping,etc.   Self-Care   Usual Activity Tolerance fair   Current Activity Tolerance poor   Regular Exercise no   Equipment Currently Used at Home cane, straight;walker, rolling;walker, standard;wheelchair, manual   Activity/Exercise/Self-Care Comment Pt reports that he hasn't been able to walk lately due to weakness and uses a WC in his home.  Pt's wife assists him with dressing and giving him a sponge bath.  Pt reports being I with toileting.  Wife does all the housework.     Functional Level Prior   Ambulation 3-->assistive equipment and person   Transferring 1-->assistive equipment   Toileting 0-->independent   Bathing 3-->assistive equipment and person   Dressing 2-->assistive person   Eating 0-->independent   Communication 0-->understands/communicates without difficulty   Swallowing 2-->difficulty swallowing liquids/foods   Cognition 0 - no cognition issues reported   Fall history within last six months yes   Number of times patient has fallen within last six months 4   Which of the above functional risks had a recent onset or change? ambulation   General Information   Onset of Illness/Injury or Date of Surgery - Date 03/19/18   Referring Physician Ximena Watts PA-C   Patient/Family Goals Statement Pt would like to be able to walk.   Pertinent History of Current Problem (include personal factors and/or comorbidities that impact the POC) Franklyn Hein is a  85 year old male with a PMH significant for multiple myeloma (started in the skull) s/p chemo tx, HTN, DM, hyperlipidemia, AAA status post repair and previous history of seizure versus syncopal spell (September 2017), who presents from PCP clinic after having 2 episodes of generalized body shaking concerning for recurrent seizures.  Pt dx with hyponatremia and questionable recurrent seizures.  Head CT was unremarkable.   Precautions/Limitations fall precautions   Weight-Bearing Status - LLE full weight-bearing   Weight-Bearing Status - RLE full weight-bearing   Heart Disease Risk Factors Diabetes;High blood pressure;Lack of physical activity;Dislipidemia;Medical history;Gender;Age   General Observations Pt lying in bed awake.   General Info Comments Vitals on RA: /147 first reading, 124/61 second reading, HR 72, SaO2 96%   Cognitive Status Examination   Orientation orientation to person, place and time   Level of Consciousness alert   Follows Commands and Answers Questions 100% of the time;able to follow single-step instructions   Personal Safety and Judgment at risk behaviors demonstrated   Memory intact   Cognitive Comment Pt very [place], has hearing aides but not in place.   Pain Assessment   Patient Currently in Pain No  (pt reports occas. shooting pain through L eye)   Integumentary/Edema   Integumentary/Edema no deficits were identifed   Posture    Posture Forward head position;Protracted shoulders   Range of Motion (ROM)   ROM Comment B LEs WFL except for tight hamstrings with B SLR to ~ 45 deg.   Strength   Strength Comments Pt presents with generalized mms atrophy in LEs > UEs.  B hip flex 3+ - 4-/5, B knee ext and ankle DF 5/5, B UEs 4+/5 shoulder flex and abd, B elbow flex/ext and wrist ext 5/5   Bed Mobility   Bed Mobility Comments Sup > sit without bedrail I.     Transfer Skills   Transfer Comments Sit <> stand CGA from elevated bed to FWW with VCs for safe hand placement.  Min A from lower chair.  "  Gait   Gait Comments Pt amb ~25' with FWW and CGA.  Pt demonstates forward flexed posture with decreased step lengths and feet clearance with narrow CAROLINE.   Balance   Balance Comments Pt demonstrates good static sitting balance on EOB.  Pt requires B UE support on FWW for stance with CGA for safety.   Sensory Examination   Sensory Perception Comments Pt reports B feet feel cold   Coordination   Coordination no deficits were identified   Muscle Tone   Muscle Tone no deficits were identified   General Therapy Interventions   Planned Therapy Interventions balance training;gait training;neuromuscular re-education;ROM;strengthening;stretching;transfer training;risk factor education;home program guidelines;progressive activity/exercise   Clinical Impression   Criteria for Skilled Therapeutic Intervention yes, treatment indicated   PT Diagnosis Decreased functional mobility   Influenced by the following impairments Decreased LE strength and mms atrophy, deconditioned, decreased activity tolerance, impaired gait pattern, assisted transfers   Functional limitations due to impairments Increased falls risk, unable to amb functional household or community distances, decreased ability to amb on stairs, assisted ADLs   Clinical Presentation Stable/Uncomplicated   Clinical Presentation Rationale Pt appears to be close to his baseline with mobility   Clinical Decision Making (Complexity) Low complexity   Therapy Frequency` daily   Predicted Duration of Therapy Intervention (days/wks) 4 days   Anticipated Discharge Disposition Home with Assist;Home with Home Therapy   Risk & Benefits of therapy have been explained Yes   Patient, Family & other staff in agreement with plan of care Yes   [facility] [employer] TM \"6 Clicks\"   2016, Trustees of [facility], under license to [employer].  All rights reserved.   6 Clicks Short Forms Basic Mobility Inpatient Short Form   [facility] AM-PAC  \"6 Clicks\" V.2 Basic Mobility " Inpatient Short Form   1. Turning from your back to your side while in a flat bed without using bedrails? 4 - None   2. Moving from lying on your back to sitting on the side of a flat bed without using bedrails? 4 - None   3. Moving to and from a bed to a chair (including a wheelchair)? 3 - A Little   4. Standing up from a chair using your arms (e.g., wheelchair, or bedside chair)? 3 - A Little   5. To walk in hospital room? 3 - A Little   6. Climbing 3-5 steps with a railing? 2 - A Lot   Basic Mobility Raw Score (Score out of 24.Lower scores equate to lower levels of function) 19   Total Evaluation Time   Total Evaluation Time (Minutes) 15

## 2018-03-20 NOTE — PLAN OF CARE
Problem: Patient Care Overview  Goal: Plan of Care/Patient Progress Review  Outcome: Improving  VSS except for high BPs alert/oriented denies pain ambulates with AX1 uses urinal at bed side urinary frequency noted UA negative.  IV fluids infusing on seizure precaution neuro checks every 4hrs no deficits noted.  Neurology/ PT/ nutrition  and social work following. Speech to see patient for  dysaphia concerns. Tele NSR w/pvc.

## 2018-03-20 NOTE — PLAN OF CARE
Problem: Seizure Disorder/Epilepsy (Adult)  Goal: Signs and Symptoms of Listed Potential Problems Will be Absent, Minimized or Managed (Seizure Disorder/Epilepsy)  Signs and symptoms of listed potential problems will be absent, minimized or managed by discharge/transition of care (reference Seizure Disorder/Epilepsy (Adult) CPG).  Outcome: Improving  VSS, BP elevated, 178/76. Afebrile. Pt voiding well, clear fe. Appetite is good, ate 100% of his dinner. Pt up w/ assist x1 and walker. UA neg., Pt uses urinal at bedside. Plan to have EEG in AM. Pt and family updated on POC. Tele is SR w/ 1AVB. No seizure activity noted, pads to pt siderails.

## 2018-03-20 NOTE — PLAN OF CARE
Problem: Patient Care Overview  Goal: Plan of Care/Patient Progress Review  Discharge Planner PT   PT orders received and eval completed.  Franklyn Hein is a 85 year old male with a PMH significant for multiple myeloma (started in the skull) s/p chemo tx, HTN, DM, hyperlipidemia, AAA status post repair and previous history of seizure versus syncopal spell (September 2017), who presents from PCP clinic after having 2 episodes of generalized body shaking concerning for recurrent seizures.  Pt dx with hyponatremia and questionable recurrent seizures.  Head CT was unremarkable.  Pt lives with wife in a rambler with 2 steps to enter.  Pt reports he has a 4WW and PUW but has been primarily WC bound due to LE weakness.  Patient plan for discharge: Home with wife  Current status: Pt able to transfer sup > sit independently and sit <> stand to FWW with CGA from elevated bed and Min A from low chair with VCs for safe hand placement.  Pt amb 25' with FWW and CGA with slow gait and forward flexed posture.  Pt presents with generalized mms atrophy and proximal LE weakness.    Barriers to return to prior living situation: limited amb distance, stairs  Recommendations for discharge: Home with A of wife for stairs, SBA with gait for safety and A for ADLs.  Home PT  Rationale for recommendations: Home PT for strengthening, conditioning, transfer and gait training to increase independence with all functional mobility in his home environment.       Entered by: Ethel Hinds 03/20/2018 10:15 AM

## 2018-03-21 ENCOUNTER — APPOINTMENT (OUTPATIENT)
Dept: SPEECH THERAPY | Facility: CLINIC | Age: 83
DRG: 643 | End: 2018-03-21
Payer: MEDICARE

## 2018-03-21 ENCOUNTER — APPOINTMENT (OUTPATIENT)
Dept: PHYSICAL THERAPY | Facility: CLINIC | Age: 83
DRG: 643 | End: 2018-03-21
Payer: MEDICARE

## 2018-03-21 VITALS
SYSTOLIC BLOOD PRESSURE: 130 MMHG | BODY MASS INDEX: 15.92 KG/M2 | OXYGEN SATURATION: 98 % | RESPIRATION RATE: 17 BRPM | HEART RATE: 70 BPM | HEIGHT: 71 IN | WEIGHT: 113.7 LBS | TEMPERATURE: 97.1 F | DIASTOLIC BLOOD PRESSURE: 59 MMHG

## 2018-03-21 LAB
ANION GAP SERPL CALCULATED.3IONS-SCNC: 6 MMOL/L (ref 3–14)
BUN SERPL-MCNC: 11 MG/DL (ref 7–30)
CALCIUM SERPL-MCNC: 8.5 MG/DL (ref 8.5–10.1)
CHLORIDE SERPL-SCNC: 99 MMOL/L (ref 94–109)
CO2 SERPL-SCNC: 24 MMOL/L (ref 20–32)
CREAT SERPL-MCNC: 0.79 MG/DL (ref 0.66–1.25)
GFR SERPL CREATININE-BSD FRML MDRD: >90 ML/MIN/1.7M2
GLUCOSE BLDC GLUCOMTR-MCNC: 125 MG/DL (ref 70–99)
GLUCOSE SERPL-MCNC: 89 MG/DL (ref 70–99)
POTASSIUM SERPL-SCNC: 4 MMOL/L (ref 3.4–5.3)
SODIUM SERPL-SCNC: 129 MMOL/L (ref 133–144)

## 2018-03-21 PROCEDURE — 36415 COLL VENOUS BLD VENIPUNCTURE: CPT | Performed by: INTERNAL MEDICINE

## 2018-03-21 PROCEDURE — 40000193 ZZH STATISTIC PT WARD VISIT: Performed by: PHYSICAL THERAPY ASSISTANT

## 2018-03-21 PROCEDURE — 00000146 ZZHCL STATISTIC GLUCOSE BY METER IP

## 2018-03-21 PROCEDURE — 40000225 ZZH STATISTIC SLP WARD VISIT

## 2018-03-21 PROCEDURE — 80048 BASIC METABOLIC PNL TOTAL CA: CPT | Performed by: INTERNAL MEDICINE

## 2018-03-21 PROCEDURE — 99239 HOSP IP/OBS DSCHRG MGMT >30: CPT | Performed by: INTERNAL MEDICINE

## 2018-03-21 PROCEDURE — A9270 NON-COVERED ITEM OR SERVICE: HCPCS | Mod: GY | Performed by: PHYSICIAN ASSISTANT

## 2018-03-21 PROCEDURE — 25000132 ZZH RX MED GY IP 250 OP 250 PS 637: Mod: GY | Performed by: PHYSICIAN ASSISTANT

## 2018-03-21 PROCEDURE — 92526 ORAL FUNCTION THERAPY: CPT | Mod: GN

## 2018-03-21 PROCEDURE — 97110 THERAPEUTIC EXERCISES: CPT | Mod: GP | Performed by: PHYSICAL THERAPY ASSISTANT

## 2018-03-21 RX ADMIN — ACETAMINOPHEN 650 MG: 325 TABLET, FILM COATED ORAL at 12:44

## 2018-03-21 RX ADMIN — OMEPRAZOLE 40 MG: 20 CAPSULE, DELAYED RELEASE ORAL at 06:10

## 2018-03-21 RX ADMIN — ACETAMINOPHEN 650 MG: 325 TABLET, FILM COATED ORAL at 08:00

## 2018-03-21 RX ADMIN — AMLODIPINE BESYLATE 5 MG: 5 TABLET ORAL at 08:00

## 2018-03-21 ASSESSMENT — ACTIVITIES OF DAILY LIVING (ADL)
ADLS_ACUITY_SCORE: 18

## 2018-03-21 NOTE — PROCEDURES
Procedure Date: 2018      FINDINGS:  This is an 18-channel recording.  During the awake portion of this recording, the posterior dominant rhythm is approximately 6 to 8 Hz.  Present bilaterally and symmetrically and attenuates with eye opening.  More anteriorly, the usual admixture of low amplitude fast activity and slower frequencies are seen. An approximately 80 beat per minute regular heart rate is noted. During phase of drowsiness, mild bilateral slowing is noted.  The presence of occasionally sharply contoured theta activity in temporal regions are noted asymmetrically. No significant changes are noted with photic stimulation.  Hyperventilation was omitted.      IMPRESSION:  Mildly slow alpha rhythm consistent with age.  Mild slowing in temporal regions associated with occasionally sharply contoured theta activity is a nonspecific finding.  No significant epileptiform activity is noted.         MARTINEZ BLAND MD             D: 2018   T: 2018   MT:       Name:     ALEJANDRA TALBOT   MRN:      [id number]        Account:        [id number]   :      10/28/1932           Procedure Date: 2018      Document: [id number]

## 2018-03-21 NOTE — PLAN OF CARE
Problem: Patient Care Overview  Goal: Plan of Care/Patient Progress Review  Discharge Planner SLP   Patient plan for discharge: none stated  Current status: Pt tolerated nectar thick liquids with no overt s/sx of aspiration. Pt declined further PO trials. Pt completed oropharyngeal exercises with moderate clinician cues. Recommend continue dysphagia diet 3 and nectar thick liquids. Caregivers to encourage independent completion of oropharyngeal exercises.   Barriers to return to prior living situation: dysphagia; poor PO intake   Recommendations for discharge: ongoing ST upon d/c   Rationale for recommendations: pt would benefit from continued ST targeting diet tolerance, advanced trials as appropriate, and to complete oropharyngeal exercises to safely return to baseline diet level        Entered by: Ludivina Musa 03/21/2018 10:56 AM

## 2018-03-21 NOTE — PLAN OF CARE
Problem: Seizure Disorder/Epilepsy (Adult)  Goal: Signs and Symptoms of Listed Potential Problems Will be Absent, Minimized or Managed (Seizure Disorder/Epilepsy)  Signs and symptoms of listed potential problems will be absent, minimized or managed by discharge/transition of care (reference Seizure Disorder/Epilepsy (Adult) CPG).   Outcome: Adequate for Discharge Date Met: 03/21/18  Discharge teaching, questions answered. Escorted via wheel chair to private car.

## 2018-03-21 NOTE — PROGRESS NOTES
Pt wants to go home instead of waiting to see neurologist.  I advised f/u with neurology as outpt and no driving (has not driven since 9/17 anyway).  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      D/c home today

## 2018-03-21 NOTE — PLAN OF CARE
Problem: Patient Care Overview  Goal: Plan of Care/Patient Progress Review  Discharge Planner PT   Patient plan for discharge: Home with wife  Current status:  Pt agreed to performing thera exs. Pt performed standing thera exs to both LEs x 10 reps with B UE support: heel/toe raises, marching, hip add/abd and leg curls. Pt c/o pain in R leg. Note pt to fatigue easily with exs and required seated rest break. Gait was deferred d/t just arriving back from walk with nursing.  Barriers to return to prior living situation: limited amb distance, stairs  Recommendations for discharge: Home with A of wife for stairs, SBA with gait for safety and A for ADLs.  Home PT  Rationale for recommendations: Home PT for strengthening, conditioning, transfer and gait training to increase independence with all functional mobility in his home environment.       Entered by: Triny Bruno 03/21/2018 11:43 AM

## 2018-03-21 NOTE — DISCHARGE SUMMARY
"Admit Date:     03/19/2018   Discharge Date:     03/21/2018      DATE OF ADMISSION:  3/19/2018.      DATE OF DISCHARGE:  3/21/2018.      PRINCIPAL FINAL DIAGNOSES:   1.  \"Shaking episode,\" unclear if seizure versus related to hyponatremia versus other.  The patient had a similar presentation in 9/2017.  At that time he was started on an antiepileptic medication, Keppra, but the patient and family did not like the side effects and he stopped the medication as he thought it was causing weakness.   2.  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  He appears to have chronically low normal sodium levels near the 130 range.   3.  Multiple myeloma history, follows with Dr. Salcedo of Oncology.  Apparently chemotherapy is currently on hold.  He was formally on Velcade and Decadron therapy, but has been on hold due to concerns about possible neuropathy related to Velcade treatment.   4.  Progressive weakness.  Home PT consultation ordered on discharge.   5.  Dysphagia with recommendation for dysphagia diet level III with nectar thickened liquids based on Speech evaluation this admission.   6.  Severe Malnutrition      PAST MEDICAL HISTORY:   1.  Abdominal aortic aneurysm repair.   2.  Possible seizure event 9/2017 as mentioned above.  At that time a brain MRI was performed showing no acute abnormalities.      PRINCIPAL PROCEDURES THIS ADMISSION:   1.  Head CT scan showing no acute findings.   2.  EEG was performed showing no evidence of seizure activity.   3.  Neurology consultation was ordered, but the patient elected to leave the hospital prior to neurologic consultation.  Will follow up with them as an outpatient instead.      REASON FOR ADMISSION:  Please see dictated history and physical.  In brief, Mr. Hein is a pleasant 85-year-old male with a history of multiple myeloma, who presented to the hospital with shaking episode.  He was found to have hyponatremia with a sodium level near 124.  He was " admitted to the Hospitalist service.        HOSPITAL COURSE:     1.  Shaking episode:  Etiology not completely clear here.  The patient does not seem to recall the event.  He also had a similar event in 9/2017, both of these were both raising the possibility of a seizure.  He had previously been treated with Keppra in the past after his event in 9/2017, but he elected to stop the medication as he felt that it made him too weak and tired.  He has seen Dr. Hernandez of Neurology in the past.  He does not currently drive.  I continued to advise no driving and I recommended follow up with Neurology, Dr. Hernandez in the clinic setting.  He preferred to discharge rather than waiting to see Neurology here in the hospital.  Head CT scan this admission showed no acute findings.  Brain MRI was not performed, but was done in 9/2017 when he had a similar presentation.  It was normal at that point.   2.  Hyponatremia:  Appears to be chronic with sodium levels in the upper 120 to low 130 range historically.  His urine sodium was above 80 and urine osmolality near 300, and findings appeared most consistent with SIADH.  Recommended he minimize fluid intake with a total fluid intake of 1 to 1.5 liters per day.  Also recommended followup with physician in the next 5-7 days to repeat sodium level.        The patient was discharged to home today.      DISCHARGE MEDICATIONS:   1.  Omeprazole 40 mg twice a day.      DISCHARGE INSTRUCTIONS:   1.  Recommend lab recheck with either Dr. Salcedo or Dr. Hoskins within the next 5-7 days.  Follow-up on sodium level.   2.  Your sodium level appears to be low from SIADH.  Treatment for this is limiting your fluid intake.  Recommend limiting fluid intake to from 1-1.5 liters per day.   3.  Recommend follow with Dr. Hernandez of Neurology to further evaluate shaking episodes and rule out seizures.  The patient was given phone number to make an appointment.   4.  No driving.      Home PT ordered on  discharge.      DISCHARGE DIET:  Dysphagia diet level III with nectar thickened liquids.      I saw and examined the patient on day of discharge.  I spent greater than 30 minutes discharging this patient from the hospital.         JEAN CURIEL MD             D: 2018   T: 2018   MT: KHARI      Name:     ALEJANDRA TALBOT   MRN:      [id number]        Account:        [id number]   :      10/28/1932           Admit Date:     2018                                  Discharge Date: 2018      Document: [id number]       cc: Tom Hoskins MD

## 2018-03-21 NOTE — PLAN OF CARE
"Problem: Patient Care Overview  Goal: Plan of Care/Patient Progress Review  Outcome: Improving  Orientation: Alert and oriented x4. Awake, alert, cooperative, no apparent distress.    VSS: /61 (BP Location: Left arm)  Pulse 77  Temp 98.4  F (36.9  C) (Axillary)  Resp 16  Ht 1.803 m (5' 11\")  Wt 51.6 kg (113 lb 11.2 oz)  SpO2 97%  BMI 15.86 kg/m2 on RA  IVF: SL  Tele: SR w/1st degree AV block, HR 70s. Regular rate and rhythm, no murmur noted  LS: clear and equal bilaterally. Clear to auscultation bilaterally but diminished throughout, No wheezing  GI: Passing gas. No BM. Denies N/V.  Normal bowel sounds, soft, non-distended, non-tender  : Adequate urine output. Clear yellow.   Skin: bruising noted, Skin otherwise intact. No rashes, no cyanosis, dry to touch  Activity: SBA to assist of 1 w/walker. Pt slept comfortably throughout shift.   Pain: No c/o pain. No PRN interventions utilized. Pt sleeping. Neuro neg q4h  DC Plan: Continue with current cares.         "

## 2018-03-21 NOTE — CONSULTS
Care Transition Initial Assessment - SW  Reason For Consult: discharge planning  Met with: Patient and wife    Active Problems:    Hyponatremia       DATA  Lives With: spouse  Living Arrangements: house  Description of Support System: Supportive, Involved  Who is your support system?: Wife  Support Assessment: Adequate family and caregiver support.      Resources List: Home Care     Quality Of Family Relationships: supportive, involved, helpful  Transportation Available: family or friend will provide    ASSESSMENT  Cognitive Status: alert and oriented  Concerns to be addressed: SW consult to assist with dc planning needs.  SW met with pt. Pt and spouse live in [place].   Pt denies having any services at home prior to admission. Pt agreeable to home care services at dc. SW reviewed home care agencies with pt and wife and they prefer [facility]. Anticipate pt may dc today.   SW sent referral to [facility].    PLAN  Financial costs for the patient includes: None at this time.  Patient given options and choices for discharge Yes .  Patient/family is agreeable to the plan? Yes  Patient Goals and Preferences: Home .  Patient anticipates discharging to:  Home with home care services .

## 2018-03-21 NOTE — PLAN OF CARE
"RN Shift Summary:  Diagnosis/Presentation: Pt admitted 3/19 due to possible seizure episodes, pt family described them as \"shaking\".   History: HTN, COPD and multiple myeloma (therapy currently on hold).   Labs/Protocols: Na: 127, K: 4.0  Telemetry: SR with 1st degree AV block  Assessment: A&Ox4. VSS on RA. Neuro checks Q 4 hours, WDL. Seizure pads in place. Pt/pt family refuses keppra due to history of weight loss while on this medication.  PIV to [facility]. EEG done today, results pending. Neurology to see pt. Dysphagia 3 diet with nectar thick liquids, poor appetite. Ax1 with transfers and ambulation. Voiding per urinal, good output.   Plan: Monitor sodium levels.     "

## 2018-03-21 NOTE — CONSULTS
"CLINICAL NUTRITION SERVICES  -  ASSESSMENT NOTE      Malnutrition: Severe        REASON FOR ASSESSMENT  Franklyn Hein is a 85 year old male seen by Registered Dietitian for Admission Nutrition Risk Screen - Unintentional weight loss of 10# or more in past 2 months and Provider Order - \"supplements\".        NUTRITION HISTORY  - Information obtained from patient, wife, and family in room.    - Patient with a h/o MM (chemotherapy on hold), DMII (diet controlled), COPD.  C/o dysphagia upon admit with a \"stuck sensation\" and \"choking on water\".    - Regular diet with thin liquids at baseline per patient/wife.  Has been noticing dysphagia symptoms over the past few \"months\".  Could not obtain more specifics.  Also verbalized and overall decrease in appetite.  - Meals/snacks consumed daily are quite variable.  \"He eats when he's hungry\".    - Patient has been consuming 2 Boost supplements daily.    - NKFA.        CURRENT NUTRITION ORDERS  Diet Order:     DD3 + NTL    Current Intake/Tolerance:  SLP bedside swallow evaluation yesterday - mild to moderate oropharyngeal dysphagia in the setting of weakness.  Recommended DD3 + NTL.  % meal consumption since admission.      PHYSICAL FINDINGS  Observed  [facility] [place] - wears hearing aides  Moderate to severe, see below --> could not observe LEs as wearing flannel pajamas  Obtained from Chart/Interdisciplinary Team  No nutritionally pertinent     ANTHROPOMETRICS  Height: 5' 11\"  Weight: 51.4 kg (113#)  Body mass index is 15.86 kg/(m^2).  Weight Status:  Underweight BMI <18.5  IBW: 78.2 kg (172#)  % IBW: 66%  Weight History:  Wt Readings from Last 10 Encounters:   03/19/18 51.6 kg (113 lb 11.2 oz)   11/16/17 61.7 kg (136 lb)   10/30/17 58.1 kg (128 lb)   10/12/17 61.7 kg (136 lb)   10/01/17 61.8 kg (136 lb 3.9 oz)   09/14/17 64 kg (141 lb)   09/10/17 64 kg (141 lb)   - 20% wt loss since 9/2017 (x 6 months).  Severe wt loss.  140# UBW prior to wt loss per patient report.  " "    LABS  Labs reviewed    MEDICATIONS  Medications reviewed    Dosing Weight 51.4 kg - admit wt    ASSESSED NUTRITION NEEDS PER APPROVED PRACTICE GUIDELINES:  Estimated Energy Needs: 8046-3201 kcals (35-40 Kcal/Kg)  Justification: repletion though question consistent ability to meet  Estimated Protein Needs:  grams protein (1.5-2 g pro/Kg)  Justification: Repletion and preservation of lean body mass though question consistent ability to meet  Estimated Fluid Needs: 4090-5480 mL (1 mL/Kcal)  Justification: maintenance and per provider pending fluid status    MALNUTRITION:  % Weight Loss:  > 10% in 6 months (severe malnutrition)  % Intake:  </= 50% for >/= 1 month (severe malnutrition)  Subcutaneous Fat Loss:  Orbital region moderate depletion and Upper arm region moderate depletion  Muscle Loss:  Temporal region at least moderate depletion and Dorsal hand region severe depletion  Fluid Retention:  None noted    Malnutrition Diagnosis: Severe malnutrition  In Context of:  Chronic illness or disease    NUTRITION DIAGNOSIS:  Malnutrition related to inadequate oral intakes with dysphagia and decreased appetite leading to LBM losses as evidenced by 20% wt loss w/in 6 months, at least moderate fat and muscle losses, likely meeting <50% needs during this time, coding of severe malnutrition.       NUTRITION INTERVENTIONS  Recommendations / Nutrition Prescription  High calorie/high protein diet at d/c.  Diet/liquid consistency per SLP.      Magic Shakes between meals BID (vanilla).        Implementation  Nutrition education: Provided education on high calorie/high protein diet:    Assessed learning needs, learning preferences, and willingness to learn    Nutrition Education (Content):  a) Provided handout \"tips for increasing protein\" and \"tips for increasing calories\".  Did emphasize that SLP recommendations for DD3 + NTL override any recommendations on handout (e.g. When adding milk for calories/protein, it must be " nectar-thick).    b) Discussed how to increase calories in meals/snacks and provided with example ideas that fit w/in current diet  c) Reviewed protein sources and supplement options that w/in current diet    Nutrition Education (Application):  a) Discussed eating habits and recommended alternative food choices.  Encouraged a protein source with each meal and continuation of supplements BID.    Patient verbalizes understanding of diet by stating desire to continue with supplements BID.    Anticipate fair compliance.    Diet Education - refer to Education Flowsheet.    Medical Food Supplement: As above.    Collaboration and Referral of Nutrition care: Discussed POC with team during rounds.       Nutrition Goals  No wt loss during review period.      MONITORING AND EVALUATION:  Progress towards goals will be monitored and evaluated per protocol and Practice Guidelines        Yudi Lopez RD, LD  Clinical Dietitian  3rd floor/ICU: [phone]  All other floors: [phone]  Weekend/holiday: [phone]

## 2018-03-22 NOTE — PLAN OF CARE
"Problem: Patient Care Overview  Goal: Plan of Care/Patient Progress Review  Physical Therapy Discharge Summary    Reason for therapy discharge:    Discharged to home with home therapy.    Progress towards therapy goal(s). See goals on Care Plan in [facility] electronic health record for goal details.  Goals partially met.  Barriers to achieving goals:   discharge from facility.  Pt last seen by PT on 3/20:     \"Discharge Planner PT   Patient plan for discharge: Home with wife  Current status:  Pt agreed to performing thera exs. Pt performed standing thera exs to both LEs x 10 reps with B UE support: heel/toe raises, marching, hip add/abd and leg curls. Pt c/o pain in R leg. Note pt to fatigue easily with exs and required seated rest break. Gait was deferred d/t just arriving back from walk with nursing.  Barriers to return to prior living situation: limited amb distance, stairs  Recommendations for discharge: Home with A of wife for stairs, SBA with gait for safety and A for ADLs.  Home PT  Rationale for recommendations: Home PT for strengthening, conditioning, transfer and gait training to increase independence with all functional mobility in his home environment.       Entered by: Triny Bruno 03/21/2018 11:43 AM\"    Therapy recommendation(s):    Continued therapy is recommended.  Rationale/Recommendations:  Home therapy to improve strength and safety/tolerance with functional mobility skills..    **Pt not seen by discharging therapist on this date, note written based on previous treating therapist's notes and recommendations.      "

## 2018-03-22 NOTE — PLAN OF CARE
Problem: Patient Care Overview  Goal: Plan of Care/Patient Progress Review  Speech Language Therapy Discharge Summary    Reason for therapy discharge:    Discharged to home with home therapy.    Progress towards therapy goal(s). See goals on Care Plan in [facility] health record for goal details.  Goals not met.  Barriers to achieving goals:   discharge from facility.    Therapy recommendation(s):    Continued therapy is recommended.  Rationale/Recommendations:  Continued ST for dysphagia upon d/c home.      Recommend continue dysphagia diet 3 and nectar thick liquids. Caregivers to encourage independent completion of oropharyngeal exercises.

## 2018-08-03 DIAGNOSIS — I71.40 ABDOMINAL AORTIC ANEURYSM (H): Primary | ICD-10-CM

## 2018-08-23 ENCOUNTER — TRANSFERRED RECORDS (OUTPATIENT)
Dept: HEALTH INFORMATION MANAGEMENT | Facility: CLINIC | Age: 83
End: 2018-08-23

## 2018-11-26 ENCOUNTER — TRANSFERRED RECORDS (OUTPATIENT)
Dept: HEALTH INFORMATION MANAGEMENT | Facility: CLINIC | Age: 83
End: 2018-11-26

## 2019-02-26 NOTE — PLAN OF CARE
"  SUBJECTIVE:                                                    Laura Pacheco is a 42 year old female who presents to clinic today for the following health issues:    Acute Illness   Acute illness concerns: Cough- body aches  Onset: 5 days    Fever: no    Chills/Sweats: YES    Headache (location?): YES    Sinus Pressure:YES    Conjunctivitis:  no    Ear Pain: YES    Rhinorrhea: YES    Congestion: YES    Sore Throat: YES    Body Aches: YES      Cough: YES-productive of yellow sputum    Wheeze: YES- on and off    Decreased Appetite: no    Nausea: no- light headed, notes having hypotensive readings at home read machine.     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      Therapies Tried and outcome: severe cold and cough- some nite time medicine     IUD PN 2014    Problem list and histories reviewed & adjusted, as indicated.  Additional history: as documented    ROS:   Constitutional, HEENT, cardiovascular, pulmonary, GI, , musculoskeletal, neuro, skin, endocrine and psych systems are negative, except as otherwise noted.  This document serves as a record of the services and decisions personally performed and made by Fermin Gamboa MD. It was created on his behalf by Howard Thurston, a trained medical scribe. The creation of this document is based the provider's statements to the medical scribe.  Scribe Howard Thurston 2:30 PM, February 26, 2019    OBJECTIVE:                     /70   Pulse 90   Temp 98.4  F (36.9  C) (Oral)   Ht 1.543 m (5' 0.75\")   Wt 60.8 kg (134 lb)   SpO2 99%   BMI 25.53 kg/m    GENERAL: no apparent distress  EYES: Conjunctiva are not injected, no discharge.  EARS: Left TM -no erythema, no effusion,  not bulged.               Right TM -no erythema, no effusion,  not bulged.  NOSE: no discharge, no sinus " Problem: Goal Outcome Summary  Goal: Goal Outcome Summary  PT:  Discharge Planner PT   Patient plan for discharge: home, agreeable to home PT  Current status: Bed mobility with mod indep; sit to stand w/ CGA/HHA, more unsteady today. Pt amb with SEC and HHA/CGA, dec step length, slow speed, more unsteady with fatigue today over 100ft taking standing rest brake, LOB x 1; discussed with pt and wife to use WW upon returning to home.  Barriers to return to prior living situation: decreased functional endurance  Recommendations for discharge: home with home PT  Rationale for recommendations: decreased endurance with functional mobility, need for strengthening to return to prior level of independence       Entered by: Carolyn Cordon 09/12/2017 12:27 PM          Physical Therapy Discharge Summary    Reason for therapy discharge:    Discharged to home with home therapy.    Progress towards therapy goal(s). See goals on Care Plan in [facility] electronic health record for goal details.  Goals partially met.  Barriers to achieving goals:   discharge from facility.    Therapy recommendation(s):    Continued therapy is recommended.  Rationale/Recommendations:  to address continued deficits with functional endurance and decreased strength.           tenderness  THROAT: mild erythema, no exudate, no lesions  NECK: supple, no adenopathy.  CARDIAC: regular rate and rhythm, no murmur  RESP: clear, no wheezing, no rales, no rhonchi  ABD: soft, no distension, no tenderness  SKIN: No rashes  LYMPHATICS: ant. cervical- mild-moderate tenderness, post. cervical- normal, axillary- normal, supraclavicular- normal, inguinal- normal    ASSESSMENT/PLAN:                     Laura was seen today for uri.    Diagnoses and all orders for this visit:    Upper respiratory tract infection, unspecified type - Likely viral in nature, recommended to allow for natural bodily recovery, and ibuprofen or naproxen for body aches. Contact clinic if symptoms worsen for antibiotics.       Symptomatic cares and fever control(if indicated) discussed.  Risks and benefits of meds discussed.    See patient instruction.    The information in this document, created by the medical scribe for me, accurately reflects the services I personally performed and the decisions made by me. I have reviewed and approved this document for accuracy prior to leaving the patient care area.  2:45 PM, 02/26/19        Alexis Gamboa MD

## 2019-09-16 ENCOUNTER — TRANSFERRED RECORDS (OUTPATIENT)
Dept: HEALTH INFORMATION MANAGEMENT | Facility: CLINIC | Age: 84
End: 2019-09-16

## 2022-01-01 ENCOUNTER — HOSPITAL ENCOUNTER (INPATIENT)
Facility: CLINIC | Age: 87
LOS: 6 days | Discharge: HOME | DRG: 871 | End: 2022-06-08
Attending: INTERNAL MEDICINE | Admitting: STUDENT IN AN ORGANIZED HEALTH CARE EDUCATION/TRAINING PROGRAM
Payer: COMMERCIAL

## 2022-01-01 ENCOUNTER — APPOINTMENT (OUTPATIENT)
Dept: GENERAL RADIOLOGY | Facility: CLINIC | Age: 87
DRG: 871 | End: 2022-01-01
Attending: INTERNAL MEDICINE
Payer: COMMERCIAL

## 2022-01-01 VITALS
RESPIRATION RATE: 20 BRPM | HEIGHT: 65 IN | BODY MASS INDEX: 19.49 KG/M2 | OXYGEN SATURATION: 94 % | TEMPERATURE: 98 F | DIASTOLIC BLOOD PRESSURE: 59 MMHG | SYSTOLIC BLOOD PRESSURE: 154 MMHG | HEART RATE: 76 BPM | WEIGHT: 117 LBS

## 2022-01-01 DIAGNOSIS — N39.0 URINARY TRACT INFECTION WITH HEMATURIA, SITE UNSPECIFIED: ICD-10-CM

## 2022-01-01 DIAGNOSIS — T68.XXXA HYPOTHERMIA, INITIAL ENCOUNTER: ICD-10-CM

## 2022-01-01 DIAGNOSIS — R31.9 URINARY TRACT INFECTION WITH HEMATURIA, SITE UNSPECIFIED: ICD-10-CM

## 2022-01-01 LAB
ALBUMIN SERPL-MCNC: 3.2 G/DL (ref 3.4–5)
ALBUMIN UR-MCNC: 100 MG/DL
ALBUMIN UR-MCNC: 200 MG/DL
ALP SERPL-CCNC: 122 U/L (ref 40–150)
ALT SERPL W P-5'-P-CCNC: 22 U/L (ref 0–70)
ANION GAP SERPL CALCULATED.3IONS-SCNC: 8 MMOL/L (ref 3–14)
ANION GAP SERPL CALCULATED.3IONS-SCNC: 9 MMOL/L (ref 3–14)
ANION GAP SERPL CALCULATED.3IONS-SCNC: 9 MMOL/L (ref 3–14)
APPEARANCE UR: ABNORMAL
APPEARANCE UR: ABNORMAL
AST SERPL W P-5'-P-CCNC: 61 U/L (ref 0–45)
ATRIAL RATE - MUSE: 69 BPM
BACTERIA #/AREA URNS HPF: ABNORMAL /HPF
BACTERIA BLD CULT: NO GROWTH
BACTERIA BLD CULT: NO GROWTH
BASOPHILS # BLD AUTO: 0 10E3/UL (ref 0–0.2)
BASOPHILS NFR BLD AUTO: 0 %
BILIRUB SERPL-MCNC: 1.1 MG/DL (ref 0.2–1.3)
BILIRUB UR QL STRIP: NEGATIVE
BILIRUB UR QL STRIP: NEGATIVE
BUN SERPL-MCNC: 36 MG/DL (ref 7–30)
BUN SERPL-MCNC: 36 MG/DL (ref 7–30)
BUN SERPL-MCNC: 37 MG/DL (ref 7–30)
CALCIUM SERPL-MCNC: 8.7 MG/DL (ref 8.5–10.1)
CALCIUM SERPL-MCNC: 8.9 MG/DL (ref 8.5–10.1)
CALCIUM SERPL-MCNC: 9.3 MG/DL (ref 8.5–10.1)
CHLORIDE BLD-SCNC: 102 MMOL/L (ref 94–109)
CHLORIDE BLD-SCNC: 105 MMOL/L (ref 94–109)
CHLORIDE BLD-SCNC: 108 MMOL/L (ref 94–109)
CK SERPL-CCNC: 140 U/L (ref 30–300)
CO2 SERPL-SCNC: 21 MMOL/L (ref 20–32)
CO2 SERPL-SCNC: 22 MMOL/L (ref 20–32)
CO2 SERPL-SCNC: 22 MMOL/L (ref 20–32)
COLOR UR AUTO: YELLOW
COLOR UR AUTO: YELLOW
CORTIS SERPL-MCNC: 18.2 UG/DL (ref 4–22)
CREAT SERPL-MCNC: 1.53 MG/DL (ref 0.66–1.25)
CREAT SERPL-MCNC: 1.61 MG/DL (ref 0.66–1.25)
CREAT SERPL-MCNC: 1.77 MG/DL (ref 0.66–1.25)
CREAT UR-MCNC: 120 MG/DL
DIASTOLIC BLOOD PRESSURE - MUSE: NORMAL MMHG
EOSINOPHIL # BLD AUTO: 0.1 10E3/UL (ref 0–0.7)
EOSINOPHIL NFR BLD AUTO: 1 %
ERYTHROCYTE [DISTWIDTH] IN BLOOD BY AUTOMATED COUNT: 15.2 % (ref 10–15)
ERYTHROCYTE [DISTWIDTH] IN BLOOD BY AUTOMATED COUNT: 15.3 % (ref 10–15)
ERYTHROCYTE [DISTWIDTH] IN BLOOD BY AUTOMATED COUNT: 15.9 % (ref 10–15)
FRACT EXCRET NA UR+SERPL-RTO: 0.2 %
GFR SERPL CREATININE-BSD FRML MDRD: 36 ML/MIN/1.73M2
GFR SERPL CREATININE-BSD FRML MDRD: 41 ML/MIN/1.73M2
GFR SERPL CREATININE-BSD FRML MDRD: 43 ML/MIN/1.73M2
GLUCOSE BLD-MCNC: 106 MG/DL (ref 70–99)
GLUCOSE BLD-MCNC: 39 MG/DL (ref 70–99)
GLUCOSE BLD-MCNC: 90 MG/DL (ref 70–99)
GLUCOSE BLDC GLUCOMTR-MCNC: 135 MG/DL (ref 70–99)
GLUCOSE BLDC GLUCOMTR-MCNC: 171 MG/DL (ref 70–99)
GLUCOSE BLDC GLUCOMTR-MCNC: 32 MG/DL (ref 70–99)
GLUCOSE BLDC GLUCOMTR-MCNC: 68 MG/DL (ref 70–99)
GLUCOSE BLDC GLUCOMTR-MCNC: 90 MG/DL (ref 70–99)
GLUCOSE BLDC GLUCOMTR-MCNC: 91 MG/DL (ref 70–99)
GLUCOSE UR STRIP-MCNC: NEGATIVE MG/DL
GLUCOSE UR STRIP-MCNC: NEGATIVE MG/DL
HCO3 BLDV-SCNC: 26 MMOL/L (ref 21–28)
HCT VFR BLD AUTO: 27.3 % (ref 40–53)
HCT VFR BLD AUTO: 29.3 % (ref 40–53)
HCT VFR BLD AUTO: 29.9 % (ref 40–53)
HGB BLD-MCNC: 8.8 G/DL (ref 13.3–17.7)
HGB BLD-MCNC: 9.5 G/DL (ref 13.3–17.7)
HGB BLD-MCNC: 9.8 G/DL (ref 13.3–17.7)
HGB UR QL STRIP: ABNORMAL
HGB UR QL STRIP: ABNORMAL
HOLD SPECIMEN: NORMAL
IMM GRANULOCYTES # BLD: 0.2 10E3/UL
IMM GRANULOCYTES NFR BLD: 2 %
INTERPRETATION ECG - MUSE: NORMAL
KETONES UR STRIP-MCNC: ABNORMAL MG/DL
KETONES UR STRIP-MCNC: ABNORMAL MG/DL
LACTATE BLD-SCNC: 0.7 MMOL/L
LEUKOCYTE ESTERASE UR QL STRIP: ABNORMAL
LEUKOCYTE ESTERASE UR QL STRIP: ABNORMAL
LIPASE SERPL-CCNC: 81 U/L (ref 73–393)
LYMPHOCYTES # BLD AUTO: 0.4 10E3/UL (ref 0.8–5.3)
LYMPHOCYTES NFR BLD AUTO: 5 %
MAGNESIUM SERPL-MCNC: 2.1 MG/DL (ref 1.6–2.3)
MCH RBC QN AUTO: 29.2 PG (ref 26.5–33)
MCH RBC QN AUTO: 29.7 PG (ref 26.5–33)
MCH RBC QN AUTO: 30.2 PG (ref 26.5–33)
MCHC RBC AUTO-ENTMCNC: 32.2 G/DL (ref 31.5–36.5)
MCHC RBC AUTO-ENTMCNC: 32.4 G/DL (ref 31.5–36.5)
MCHC RBC AUTO-ENTMCNC: 32.8 G/DL (ref 31.5–36.5)
MCV RBC AUTO: 91 FL (ref 78–100)
MCV RBC AUTO: 92 FL (ref 78–100)
MCV RBC AUTO: 92 FL (ref 78–100)
MONOCYTES # BLD AUTO: 0.4 10E3/UL (ref 0–1.3)
MONOCYTES NFR BLD AUTO: 6 %
MUCOUS THREADS #/AREA URNS LPF: PRESENT /LPF
NEUTROPHILS # BLD AUTO: 6.2 10E3/UL (ref 1.6–8.3)
NEUTROPHILS NFR BLD AUTO: 86 %
NITRATE UR QL: NEGATIVE
NITRATE UR QL: POSITIVE
NRBC # BLD AUTO: 0 10E3/UL
NRBC BLD AUTO-RTO: 0 /100
P AXIS - MUSE: 71 DEGREES
PCO2 BLDV: 49 MM HG (ref 40–50)
PH BLDV: 7.33 [PH] (ref 7.32–7.43)
PH UR STRIP: 5.5 [PH] (ref 5–7)
PH UR STRIP: 6.5 [PH] (ref 5–7)
PLATELET # BLD AUTO: 57 10E3/UL (ref 150–450)
PLATELET # BLD AUTO: 64 10E3/UL (ref 150–450)
PLATELET # BLD AUTO: 70 10E3/UL (ref 150–450)
PO2 BLDV: 46 MM HG (ref 25–47)
POTASSIUM BLD-SCNC: 4.6 MMOL/L (ref 3.4–5.3)
POTASSIUM BLD-SCNC: 4.8 MMOL/L (ref 3.4–5.3)
POTASSIUM BLD-SCNC: 4.9 MMOL/L (ref 3.4–5.3)
PR INTERVAL - MUSE: 196 MS
PROT SERPL-MCNC: 7.7 G/DL (ref 6.8–8.8)
QRS DURATION - MUSE: 104 MS
QT - MUSE: 466 MS
QTC - MUSE: 499 MS
R AXIS - MUSE: 79 DEGREES
RBC # BLD AUTO: 3.01 10E6/UL (ref 4.4–5.9)
RBC # BLD AUTO: 3.2 10E6/UL (ref 4.4–5.9)
RBC # BLD AUTO: 3.25 10E6/UL (ref 4.4–5.9)
RBC URINE: 36 /HPF
RBC URINE: >182 /HPF
SAO2 % BLDV: 78 % (ref 94–100)
SARS-COV-2 RNA RESP QL NAA+PROBE: NEGATIVE
SODIUM SERPL-SCNC: 133 MMOL/L (ref 133–144)
SODIUM SERPL-SCNC: 135 MMOL/L (ref 133–144)
SODIUM SERPL-SCNC: 138 MMOL/L (ref 133–144)
SODIUM UR-SCNC: 19 MMOL/L
SP GR UR STRIP: 1.01 (ref 1–1.03)
SP GR UR STRIP: 1.03 (ref 1–1.03)
SQUAMOUS EPITHELIAL: <1 /HPF
SYSTOLIC BLOOD PRESSURE - MUSE: NORMAL MMHG
T AXIS - MUSE: 109 DEGREES
T4 FREE SERPL-MCNC: 1.3 NG/DL (ref 0.76–1.46)
TROPONIN I SERPL HS-MCNC: 14 NG/L
TSH SERPL DL<=0.005 MIU/L-ACNC: 8.72 MU/L (ref 0.4–4)
UROBILINOGEN UR STRIP-MCNC: NORMAL MG/DL
UROBILINOGEN UR STRIP-MCNC: NORMAL MG/DL
VENTRICULAR RATE- MUSE: 69 BPM
WBC # BLD AUTO: 5.8 10E3/UL (ref 4–11)
WBC # BLD AUTO: 7.1 10E3/UL (ref 4–11)
WBC # BLD AUTO: 7.4 10E3/UL (ref 4–11)
WBC CLUMPS #/AREA URNS HPF: PRESENT /HPF
WBC CLUMPS #/AREA URNS HPF: PRESENT /HPF
WBC URINE: 155 /HPF
WBC URINE: >182 /HPF

## 2022-01-01 PROCEDURE — 99233 SBSQ HOSP IP/OBS HIGH 50: CPT | Performed by: STUDENT IN AN ORGANIZED HEALTH CARE EDUCATION/TRAINING PROGRAM

## 2022-01-01 PROCEDURE — 120N000001 HC R&B MED SURG/OB

## 2022-01-01 PROCEDURE — 250N000011 HC RX IP 250 OP 636: Performed by: INTERNAL MEDICINE

## 2022-01-01 PROCEDURE — 82533 TOTAL CORTISOL: CPT | Performed by: STUDENT IN AN ORGANIZED HEALTH CARE EDUCATION/TRAINING PROGRAM

## 2022-01-01 PROCEDURE — 84439 ASSAY OF FREE THYROXINE: CPT | Performed by: STUDENT IN AN ORGANIZED HEALTH CARE EDUCATION/TRAINING PROGRAM

## 2022-01-01 PROCEDURE — 99231 SBSQ HOSP IP/OBS SF/LOW 25: CPT | Performed by: INTERNAL MEDICINE

## 2022-01-01 PROCEDURE — 80048 BASIC METABOLIC PNL TOTAL CA: CPT | Performed by: STUDENT IN AN ORGANIZED HEALTH CARE EDUCATION/TRAINING PROGRAM

## 2022-01-01 PROCEDURE — 96365 THER/PROPH/DIAG IV INF INIT: CPT

## 2022-01-01 PROCEDURE — 84443 ASSAY THYROID STIM HORMONE: CPT | Performed by: STUDENT IN AN ORGANIZED HEALTH CARE EDUCATION/TRAINING PROGRAM

## 2022-01-01 PROCEDURE — 96361 HYDRATE IV INFUSION ADD-ON: CPT

## 2022-01-01 PROCEDURE — 81001 URINALYSIS AUTO W/SCOPE: CPT | Performed by: INTERNAL MEDICINE

## 2022-01-01 PROCEDURE — 96366 THER/PROPH/DIAG IV INF ADDON: CPT

## 2022-01-01 PROCEDURE — 99223 1ST HOSP IP/OBS HIGH 75: CPT | Mod: AI | Performed by: STUDENT IN AN ORGANIZED HEALTH CARE EDUCATION/TRAINING PROGRAM

## 2022-01-01 PROCEDURE — 85027 COMPLETE CBC AUTOMATED: CPT | Performed by: STUDENT IN AN ORGANIZED HEALTH CARE EDUCATION/TRAINING PROGRAM

## 2022-01-01 PROCEDURE — 82803 BLOOD GASES ANY COMBINATION: CPT

## 2022-01-01 PROCEDURE — 36415 COLL VENOUS BLD VENIPUNCTURE: CPT | Performed by: INTERNAL MEDICINE

## 2022-01-01 PROCEDURE — 93005 ELECTROCARDIOGRAM TRACING: CPT

## 2022-01-01 PROCEDURE — 250N000009 HC RX 250: Performed by: STUDENT IN AN ORGANIZED HEALTH CARE EDUCATION/TRAINING PROGRAM

## 2022-01-01 PROCEDURE — 258N000003 HC RX IP 258 OP 636: Performed by: INTERNAL MEDICINE

## 2022-01-01 PROCEDURE — 83735 ASSAY OF MAGNESIUM: CPT | Performed by: STUDENT IN AN ORGANIZED HEALTH CARE EDUCATION/TRAINING PROGRAM

## 2022-01-01 PROCEDURE — U0005 INFEC AGEN DETEC AMPLI PROBE: HCPCS | Performed by: INTERNAL MEDICINE

## 2022-01-01 PROCEDURE — 84484 ASSAY OF TROPONIN QUANT: CPT | Performed by: INTERNAL MEDICINE

## 2022-01-01 PROCEDURE — 83605 ASSAY OF LACTIC ACID: CPT

## 2022-01-01 PROCEDURE — 71045 X-RAY EXAM CHEST 1 VIEW: CPT

## 2022-01-01 PROCEDURE — 87040 BLOOD CULTURE FOR BACTERIA: CPT | Performed by: INTERNAL MEDICINE

## 2022-01-01 PROCEDURE — 99285 EMERGENCY DEPT VISIT HI MDM: CPT | Mod: 25

## 2022-01-01 PROCEDURE — 36415 COLL VENOUS BLD VENIPUNCTURE: CPT | Performed by: STUDENT IN AN ORGANIZED HEALTH CARE EDUCATION/TRAINING PROGRAM

## 2022-01-01 PROCEDURE — 258N000003 HC RX IP 258 OP 636: Performed by: STUDENT IN AN ORGANIZED HEALTH CARE EDUCATION/TRAINING PROGRAM

## 2022-01-01 PROCEDURE — 250N000009 HC RX 250: Performed by: NURSE PRACTITIONER

## 2022-01-01 PROCEDURE — 258N000001 HC RX 258

## 2022-01-01 PROCEDURE — 85025 COMPLETE CBC W/AUTO DIFF WBC: CPT | Performed by: INTERNAL MEDICINE

## 2022-01-01 PROCEDURE — 83690 ASSAY OF LIPASE: CPT | Performed by: STUDENT IN AN ORGANIZED HEALTH CARE EDUCATION/TRAINING PROGRAM

## 2022-01-01 PROCEDURE — 51702 INSERT TEMP BLADDER CATH: CPT

## 2022-01-01 PROCEDURE — 80053 COMPREHEN METABOLIC PANEL: CPT | Performed by: INTERNAL MEDICINE

## 2022-01-01 PROCEDURE — 258N000001 HC RX 258: Performed by: INTERNAL MEDICINE

## 2022-01-01 PROCEDURE — 250N000013 HC RX MED GY IP 250 OP 250 PS 637: Performed by: NURSE PRACTITIONER

## 2022-01-01 PROCEDURE — 81001 URINALYSIS AUTO W/SCOPE: CPT | Performed by: STUDENT IN AN ORGANIZED HEALTH CARE EDUCATION/TRAINING PROGRAM

## 2022-01-01 PROCEDURE — 99223 1ST HOSP IP/OBS HIGH 75: CPT | Performed by: NURSE PRACTITIONER

## 2022-01-01 PROCEDURE — 99239 HOSP IP/OBS DSCHRG MGMT >30: CPT | Performed by: INTERNAL MEDICINE

## 2022-01-01 PROCEDURE — 250N000011 HC RX IP 250 OP 636: Performed by: NURSE PRACTITIONER

## 2022-01-01 PROCEDURE — 84300 ASSAY OF URINE SODIUM: CPT | Performed by: STUDENT IN AN ORGANIZED HEALTH CARE EDUCATION/TRAINING PROGRAM

## 2022-01-01 PROCEDURE — 82550 ASSAY OF CK (CPK): CPT | Performed by: STUDENT IN AN ORGANIZED HEALTH CARE EDUCATION/TRAINING PROGRAM

## 2022-01-01 PROCEDURE — 96360 HYDRATION IV INFUSION INIT: CPT | Mod: 59

## 2022-01-01 PROCEDURE — C9803 HOPD COVID-19 SPEC COLLECT: HCPCS

## 2022-01-01 PROCEDURE — 99356 PR PROLONGED SERV,INPATIENT,1ST HR: CPT | Performed by: NURSE PRACTITIONER

## 2022-01-01 RX ORDER — DEXTROSE MONOHYDRATE 25 G/50ML
INJECTION, SOLUTION INTRAVENOUS
Status: COMPLETED
Start: 2022-01-01 | End: 2022-01-01

## 2022-01-01 RX ORDER — OLANZAPINE 5 MG/1
5 TABLET, ORALLY DISINTEGRATING ORAL EVERY 6 HOURS PRN
Status: DISCONTINUED | OUTPATIENT
Start: 2022-01-01 | End: 2022-01-01 | Stop reason: HOSPADM

## 2022-01-01 RX ORDER — HYDROMORPHONE HYDROCHLORIDE 1 MG/ML
0.5 SOLUTION ORAL
Status: DISCONTINUED | OUTPATIENT
Start: 2022-01-01 | End: 2022-01-01 | Stop reason: HOSPADM

## 2022-01-01 RX ORDER — NALOXONE HYDROCHLORIDE 0.4 MG/ML
0.1 INJECTION, SOLUTION INTRAMUSCULAR; INTRAVENOUS; SUBCUTANEOUS
Status: DISCONTINUED | OUTPATIENT
Start: 2022-01-01 | End: 2022-01-01 | Stop reason: HOSPADM

## 2022-01-01 RX ORDER — AMOXICILLIN 250 MG
1 CAPSULE ORAL 2 TIMES DAILY PRN
Status: DISCONTINUED | OUTPATIENT
Start: 2022-01-01 | End: 2022-01-01 | Stop reason: HOSPADM

## 2022-01-01 RX ORDER — DEXTROSE MONOHYDRATE 25 G/50ML
25-50 INJECTION, SOLUTION INTRAVENOUS
Status: DISCONTINUED | OUTPATIENT
Start: 2022-01-01 | End: 2022-01-01

## 2022-01-01 RX ORDER — HYDROMORPHONE HCL IN WATER/PF 6 MG/30 ML
0.2 PATIENT CONTROLLED ANALGESIA SYRINGE INTRAVENOUS
Status: DISCONTINUED | OUTPATIENT
Start: 2022-01-01 | End: 2022-01-01 | Stop reason: HOSPADM

## 2022-01-01 RX ORDER — NALOXONE HYDROCHLORIDE 0.4 MG/ML
0.2 INJECTION, SOLUTION INTRAMUSCULAR; INTRAVENOUS; SUBCUTANEOUS
Status: DISCONTINUED | OUTPATIENT
Start: 2022-01-01 | End: 2022-01-01 | Stop reason: HOSPADM

## 2022-01-01 RX ORDER — SODIUM CHLORIDE, SODIUM LACTATE, POTASSIUM CHLORIDE, CALCIUM CHLORIDE 600; 310; 30; 20 MG/100ML; MG/100ML; MG/100ML; MG/100ML
INJECTION, SOLUTION INTRAVENOUS CONTINUOUS
Status: DISCONTINUED | OUTPATIENT
Start: 2022-01-01 | End: 2022-01-01

## 2022-01-01 RX ORDER — ACETAMINOPHEN 325 MG/1
650 TABLET ORAL EVERY 6 HOURS PRN
Status: DISCONTINUED | OUTPATIENT
Start: 2022-01-01 | End: 2022-01-01

## 2022-01-01 RX ORDER — LIDOCAINE 40 MG/G
CREAM TOPICAL
Status: DISCONTINUED | OUTPATIENT
Start: 2022-01-01 | End: 2022-01-01 | Stop reason: HOSPADM

## 2022-01-01 RX ORDER — GLYCOPYRROLATE 0.2 MG/ML
0.2 INJECTION, SOLUTION INTRAMUSCULAR; INTRAVENOUS EVERY 4 HOURS PRN
Status: DISCONTINUED | OUTPATIENT
Start: 2022-01-01 | End: 2022-01-01 | Stop reason: HOSPADM

## 2022-01-01 RX ORDER — SODIUM CHLORIDE 9 MG/ML
1000 INJECTION, SOLUTION INTRAVENOUS CONTINUOUS
Status: DISCONTINUED | OUTPATIENT
Start: 2022-01-01 | End: 2022-01-01

## 2022-01-01 RX ORDER — LORAZEPAM 1 MG/1
1 TABLET ORAL
Status: DISCONTINUED | OUTPATIENT
Start: 2022-01-01 | End: 2022-01-01 | Stop reason: HOSPADM

## 2022-01-01 RX ORDER — CARBOXYMETHYLCELLULOSE SODIUM 5 MG/ML
1-2 SOLUTION/ DROPS OPHTHALMIC
Status: DISCONTINUED | OUTPATIENT
Start: 2022-01-01 | End: 2022-01-01 | Stop reason: HOSPADM

## 2022-01-01 RX ORDER — OLANZAPINE 5 MG/1
2.5 TABLET ORAL DAILY
COMMUNITY

## 2022-01-01 RX ORDER — ACETAMINOPHEN 650 MG/1
650 SUPPOSITORY RECTAL EVERY 6 HOURS PRN
Status: DISCONTINUED | OUTPATIENT
Start: 2022-01-01 | End: 2022-01-01 | Stop reason: HOSPADM

## 2022-01-01 RX ORDER — SALIVA STIMULANT COMB. NO.3
1 SPRAY, NON-AEROSOL (ML) MUCOUS MEMBRANE
Status: DISCONTINUED | OUTPATIENT
Start: 2022-01-01 | End: 2022-01-01 | Stop reason: HOSPADM

## 2022-01-01 RX ORDER — DEXTROSE MONOHYDRATE 25 G/50ML
INJECTION, SOLUTION INTRAVENOUS
Status: DISPENSED
Start: 2022-01-01 | End: 2022-01-01

## 2022-01-01 RX ORDER — AMOXICILLIN 250 MG
2 CAPSULE ORAL 2 TIMES DAILY PRN
Status: DISCONTINUED | OUTPATIENT
Start: 2022-01-01 | End: 2022-01-01 | Stop reason: HOSPADM

## 2022-01-01 RX ORDER — ATROPINE SULFATE 10 MG/ML
2 SOLUTION/ DROPS OPHTHALMIC EVERY 4 HOURS PRN
Status: DISCONTINUED | OUTPATIENT
Start: 2022-01-01 | End: 2022-01-01 | Stop reason: HOSPADM

## 2022-01-01 RX ORDER — LORAZEPAM 2 MG/ML
1 INJECTION INTRAMUSCULAR
Status: DISCONTINUED | OUTPATIENT
Start: 2022-01-01 | End: 2022-01-01 | Stop reason: HOSPADM

## 2022-01-01 RX ORDER — QUETIAPINE FUMARATE 25 MG/1
25 TABLET, FILM COATED ORAL AT BEDTIME
COMMUNITY

## 2022-01-01 RX ORDER — ACETAMINOPHEN 325 MG/1
650 TABLET ORAL EVERY 6 HOURS
COMMUNITY

## 2022-01-01 RX ORDER — GLYCOPYRROLATE 0.2 MG/ML
0.2 INJECTION, SOLUTION INTRAMUSCULAR; INTRAVENOUS EVERY 4 HOURS PRN
Status: DISCONTINUED | OUTPATIENT
Start: 2022-01-01 | End: 2022-01-01 | Stop reason: CLARIF

## 2022-01-01 RX ORDER — NICOTINE POLACRILEX 4 MG
15-30 LOZENGE BUCCAL
Status: DISCONTINUED | OUTPATIENT
Start: 2022-01-01 | End: 2022-01-01

## 2022-01-01 RX ADMIN — TAZOBACTAM SODIUM AND PIPERACILLIN SODIUM 3.38 G: 375; 3 INJECTION, SOLUTION INTRAVENOUS at 03:15

## 2022-01-01 RX ADMIN — LORAZEPAM 1 MG: 2 INJECTION INTRAMUSCULAR; INTRAVENOUS at 09:43

## 2022-01-01 RX ADMIN — SODIUM CHLORIDE, POTASSIUM CHLORIDE, SODIUM LACTATE AND CALCIUM CHLORIDE 500 ML: 600; 310; 30; 20 INJECTION, SOLUTION INTRAVENOUS at 09:08

## 2022-01-01 RX ADMIN — TAZOBACTAM SODIUM AND PIPERACILLIN SODIUM 3.38 G: 375; 3 INJECTION, SOLUTION INTRAVENOUS at 08:41

## 2022-01-01 RX ADMIN — DEXTROSE MONOHYDRATE 50 ML: 25 INJECTION, SOLUTION INTRAVENOUS at 02:21

## 2022-01-01 RX ADMIN — GLYCOPYRROLATE 0.2 MG: 0.2 INJECTION INTRAMUSCULAR; INTRAVENOUS at 21:14

## 2022-01-01 RX ADMIN — TAZOBACTAM SODIUM AND PIPERACILLIN SODIUM 4.5 G: 500; 4 INJECTION, SOLUTION INTRAVENOUS at 14:57

## 2022-01-01 RX ADMIN — SODIUM CHLORIDE 1000 ML: 9 INJECTION, SOLUTION INTRAVENOUS at 13:35

## 2022-01-01 RX ADMIN — DEXTROSE AND SODIUM CHLORIDE: 5; 900 INJECTION, SOLUTION INTRAVENOUS at 02:32

## 2022-01-01 RX ADMIN — DEXTROSE MONOHYDRATE 25 ML: 25 INJECTION, SOLUTION INTRAVENOUS at 11:17

## 2022-01-01 RX ADMIN — ATROPINE SULFATE 2 DROP: 10 SOLUTION/ DROPS OPHTHALMIC at 18:25

## 2022-01-01 RX ADMIN — TAZOBACTAM SODIUM AND PIPERACILLIN SODIUM 3.38 G: 375; 3 INJECTION, SOLUTION INTRAVENOUS at 20:54

## 2022-01-01 RX ADMIN — ACETAMINOPHEN 650 MG: 650 SUPPOSITORY RECTAL at 16:33

## 2022-01-01 RX ADMIN — SODIUM CHLORIDE, POTASSIUM CHLORIDE, SODIUM LACTATE AND CALCIUM CHLORIDE: 600; 310; 30; 20 INJECTION, SOLUTION INTRAVENOUS at 18:53

## 2022-01-01 RX ADMIN — ATROPINE SULFATE 2 DROP: 10 SOLUTION/ DROPS OPHTHALMIC at 00:29

## 2022-01-01 RX ADMIN — HYDROMORPHONE HYDROCHLORIDE 0.2 MG: 0.2 INJECTION, SOLUTION INTRAMUSCULAR; INTRAVENOUS; SUBCUTANEOUS at 10:52

## 2022-01-01 ASSESSMENT — ENCOUNTER SYMPTOMS
ACTIVITY CHANGE: 1
APPETITE CHANGE: 1
CONFUSION: 1
HEMATURIA: 1

## 2022-01-01 ASSESSMENT — ACTIVITIES OF DAILY LIVING (ADL)
ADLS_ACUITY_SCORE: 71
ADLS_ACUITY_SCORE: 71
TOILETING: 2-->COMPLETELY DEPENDENT
ADLS_ACUITY_SCORE: 73
ADLS_ACUITY_SCORE: 53
DRESS: 2-->COMPLETELY DEPENDENT (NOT DEVELOPMENTALLY APPROPRIATE)
ADLS_ACUITY_SCORE: 71
TRANSFERRING: 2-->COMPLETELY DEPENDENT
ADLS_ACUITY_SCORE: 73
ADLS_ACUITY_SCORE: 67
ADLS_ACUITY_SCORE: 67
ADLS_ACUITY_SCORE: 71
ADLS_ACUITY_SCORE: 70
ADLS_ACUITY_SCORE: 73
ADLS_ACUITY_SCORE: 71
FALL_HISTORY_WITHIN_LAST_SIX_MONTHS: YES
ADLS_ACUITY_SCORE: 35
EATING: 2-->COMPLETELY DEPENDENT
ADLS_ACUITY_SCORE: 35
ADLS_ACUITY_SCORE: 67
WALKING_OR_CLIMBING_STAIRS: AMBULATION DIFFICULTY, DEPENDENT;STAIR CLIMBING DIFFICULTY, DEPENDENT;TRANSFERRING DIFFICULTY, DEPENDENT
ADLS_ACUITY_SCORE: 67
ADLS_ACUITY_SCORE: 71
ADLS_ACUITY_SCORE: 67
CHANGE_IN_FUNCTIONAL_STATUS_SINCE_ONSET_OF_CURRENT_ILLNESS/INJURY: YES
ADLS_ACUITY_SCORE: 71
ADLS_ACUITY_SCORE: 67
EATING/SWALLOWING: SWALLOWING LIQUIDS;SWALLOWING SOLID FOOD
EQUIPMENT_CURRENTLY_USED_AT_HOME: CANE, STRAIGHT
ADLS_ACUITY_SCORE: 47
ADLS_ACUITY_SCORE: 67
ADLS_ACUITY_SCORE: 71
ADLS_ACUITY_SCORE: 67
ADLS_ACUITY_SCORE: 67
DRESSING/BATHING_DIFFICULTY: YES
ADLS_ACUITY_SCORE: 67
ADLS_ACUITY_SCORE: 71
ADLS_ACUITY_SCORE: 67
ADLS_ACUITY_SCORE: 70
SWALLOWING: 2-->DIFFICULTY SWALLOWING LIQUIDS
TOILETING_ASSISTANCE: TOILETING DIFFICULTY, DEPENDENT
BATHING: 2-->COMPLETELY DEPENDENT (NOT DEVELOPMENTALLY APPROPRIATE)
TOILETING: 2-->COMPLETELY DEPENDENT (NOT DEVELOPMENTALLY APPROPRIATE)
ADLS_ACUITY_SCORE: 47
ADLS_ACUITY_SCORE: 67
ADLS_ACUITY_SCORE: 71
WEAR_GLASSES_OR_BLIND: NO
ADLS_ACUITY_SCORE: 71
ADLS_ACUITY_SCORE: 67
DIFFICULTY_EATING/SWALLOWING: YES
ADLS_ACUITY_SCORE: 35
ADLS_ACUITY_SCORE: 71
ADLS_ACUITY_SCORE: 67
ADLS_ACUITY_SCORE: 51
DRESS: 2-->COMPLETELY DEPENDENT
SWALLOWING: 2-->DIFFICULTY SWALLOWING LIQUIDS/FOODS
ADLS_ACUITY_SCORE: 73
ADLS_ACUITY_SCORE: 47
ADLS_ACUITY_SCORE: 67
EATING: 2-->COMPLETELY DEPENDENT (NOT DEVELOPMENTALLY APPROPRIATE)
ADLS_ACUITY_SCORE: 67
ADLS_ACUITY_SCORE: 67
TRANSFERRING: 2-->COMPLETELY DEPENDENT (NOT DEVELOPMENTALLY APPROPRIATE)
WALKING_OR_CLIMBING_STAIRS_DIFFICULTY: YES
ADLS_ACUITY_SCORE: 67
DIFFICULTY_COMMUNICATING: YES
ADLS_ACUITY_SCORE: 71
ADLS_ACUITY_SCORE: 71
DRESSING/BATHING: DRESSING DIFFICULTY, DEPENDENT;BATHING DIFFICULTY, DEPENDENT
ADLS_ACUITY_SCORE: 51
ADLS_ACUITY_SCORE: 70
ADLS_ACUITY_SCORE: 71
ADLS_ACUITY_SCORE: 47
ADLS_ACUITY_SCORE: 71
ADLS_ACUITY_SCORE: 73
ADLS_ACUITY_SCORE: 67
ADLS_ACUITY_SCORE: 67
NUMBER_OF_TIMES_PATIENT_HAS_FALLEN_WITHIN_LAST_SIX_MONTHS: 2
ADLS_ACUITY_SCORE: 67
ADLS_ACUITY_SCORE: 71
TOILETING_ISSUES: YES
ADLS_ACUITY_SCORE: 51
DOING_ERRANDS_INDEPENDENTLY_DIFFICULTY: YES
ADLS_ACUITY_SCORE: 71
ADLS_ACUITY_SCORE: 68
ADLS_ACUITY_SCORE: 67
ADLS_ACUITY_SCORE: 67
ADLS_ACUITY_SCORE: 41
ADLS_ACUITY_SCORE: 73
ADLS_ACUITY_SCORE: 67
COMMUNICATION: UNABLE TO SPEAK;UNABLE TO UNDERSTAND

## 2022-06-02 PROBLEM — A41.9 SEPSIS WITHOUT ACUTE ORGAN DYSFUNCTION, DUE TO UNSPECIFIED ORGANISM (H): Status: ACTIVE | Noted: 2022-01-01

## 2022-06-02 PROBLEM — T68.XXXA HYPOTHERMIA, INITIAL ENCOUNTER: Status: ACTIVE | Noted: 2022-01-01

## 2022-06-02 PROBLEM — R31.9 URINARY TRACT INFECTION WITH HEMATURIA, SITE UNSPECIFIED: Status: ACTIVE | Noted: 2022-01-01

## 2022-06-02 PROBLEM — N39.0 URINARY TRACT INFECTION WITH HEMATURIA, SITE UNSPECIFIED: Status: ACTIVE | Noted: 2022-01-01

## 2022-06-02 NOTE — ED PROVIDER NOTES
History     Chief Complaint:  Hematuria     HPI:  The history is provided by the EMS personnel and medical records. History limited by: dementia.      Franklyn Hein is a 89 year old male with a history of Lewy body dementia, multiple myeloma, type II diabetes mellitus, hypertension, hyperlipidemia, AAA, seizure disorder, COPD who presents with hematuria which his wife, who cares for him at home, noticed in his Depends brief this morning. EMS reports that for the past 1-2 days, Franklyn has not been eating or drinking fluids. EMS believes that he is able to get up and ambulate somewhat at baseline. Per chart review, Franklyn's wife called a nurse triage line regarding his symptoms. She told the triage nurse that in addition to his hematuria, he has been acting more confused than he typically does with his baseline Lewy body dementia. His wife has not been able to understand his mumbled speech today, and he has not been able to get up from his recliner and stand up. On EMS arrival, Franklyn was laying in his recliner. He appeared to be somewhat agitated as he was trying to strike the EMS personnel with his fists. Due to his agitation, EMS was unable to obtain any vitals or basic labs besides a blood pressure.    Upon speaking with his wife at a later time in the ED, she reports that his Seroquel and Zyprexa are new medications as of 6 days ago. He was prescribed these to help him sleep; however, this has not been helpful. His wife reports that he repeatedly got up overnight last night and opened and closed the curtains. This morning, his wife found him naked with no blankets on and soaked with sweat. When asked about ability to adequately care for themselves at home, wife states that on most days, caring for Franklyn is slightly more than she can personally handle. Recently, however, she feels like he needs significantly more care than she can provide. She states that they have tried to explore placement in memory  care or another care facility but he has not been accepted.    Review of Systems   Unable to perform ROS: Dementia   Constitutional: Positive for activity change and appetite change.   Genitourinary: Positive for hematuria.   Psychiatric/Behavioral: Positive for confusion.     Allergies:  The patient has no known allergies.     Medications:  Omeprazole   Tramadol  Zyprexa  Seroquel    Past Medical History:     AAA  Anemia  Ataxia   Cancer   Emphysematous COPD  Type II diabetes mellitus   Hyperlipidemia   Hypertension   Multiple myeloma  Nocturia   Pulmonary fibrosis  Tubular adenoma   Seizure disorder  Hyponatremia  Lewy body dementia  Benign prostatic hyperplasia with lower urinary tract symptoms  CKD, stage II  Pulmonary nodules  Colonic polyps   Chronic esophagitis  Hearing loss, partial    Past Surgical History:    Appendectomy  Colonoscopy  AAA repair  EGD  Cataract surgery  Chest tube for pneumothorax, left    Family History:    Mother: diabetes, hyperlipidemia, hypertension  Father: diabetes  Brother: diabetes  Sister: diabetes    Social History:  The patient presents to the ED alone.  The patient presents to the ED via EMS.  The patient is  and lives with his wife who cares for him.     Physical Exam     Patient Vitals for the past 24 hrs:   BP Temp Temp src Pulse Resp SpO2   06/02/22 1745 (!) 170/95 (!) 91.76  F (33.2  C) -- 76 14 98 %   06/02/22 1730 -- (!) 91.58  F (33.1  C) -- 77 14 98 %   06/02/22 1715 (!) 173/96 (!) 91.58  F (33.1  C) -- 73 20 98 %   06/02/22 1700 (!) 171/84 (!) 91.4  F (33  C) -- 75 16 99 %   06/02/22 1645 (!) 164/86 (!) 91.22  F (32.9  C) -- 76 14 98 %   06/02/22 1643 (!) 164/86 (!) 91.22  F (32.9  C) -- 75 14 98 %   06/02/22 1615 (!) 164/67 (!) 91.22  F (32.9  C) -- 79 -- --   06/02/22 1600 (!) 159/76 (!) 91.04  F (32.8  C) -- 76 18 --   06/02/22 1520 (!) 166/91 (!) 90.5  F (32.5  C) -- 76 10 --   06/02/22 1500 (!) 175/104 (!) 89.96  F (32.2  C) -- 74 10 --   06/02/22 1447  (!) 175/99 (!) 89.42  F (31.9  C) -- 72 13 --   06/02/22 1420 (!) 178/90 (!) 89.06  F (31.7  C) -- 71 14 --   06/02/22 1416 -- (!) 88.88  F (31.6  C) -- 68 9 --   06/02/22 1400 (!) 189/94 (!) 88.52  F (31.4  C) -- 68 8 --   06/02/22 1324 (!) 176/68 (!) 88.16  F (31.2  C) -- 69 8 100 %   06/02/22 1244 -- (!) 88.3  F (31.3  C) Rectal -- -- --   06/02/22 1200 -- -- -- -- -- 100 %   06/02/22 1155 (!) 185/96 -- -- 69 -- 99 %     Physical Exam  Constitutional:       Comments: Resting on his side, cooperative with exam   HENT:      Mouth/Throat:      Pharynx: No posterior oropharyngeal erythema.   Eyes:      Conjunctiva/sclera: Conjunctivae normal.   Cardiovascular:      Rate and Rhythm: Normal rate and regular rhythm.      Heart sounds: Normal heart sounds.   Pulmonary:      Effort: Pulmonary effort is normal.      Breath sounds: Normal breath sounds.   Abdominal:      General: Bowel sounds are normal. There is no distension.      Palpations: Abdomen is soft.      Tenderness: There is no abdominal tenderness. There is no guarding or rebound.   Musculoskeletal:         General: Normal range of motion.      Cervical back: Neck supple.   Skin:     General: Skin is warm and dry.   Neurological:      Mental Status: He is alert.       Emergency Department Course     ECG  ECG taken at 1308, ECG read at 1322  Sinus rhythm with premature atrial complexes with aberrant conduction  Nonspecific ST and T wave abnormality  Prolonged QT  Abnormal ECG  No significant change as compared to prior, dated 3/19/18.  Rate 69 bpm. AL interval 196 ms. QRS duration 104 ms. QT/QTc 466/499 ms. P-R-T axes 71 79 109.      Imaging:  XR Chest Port 1 View   Final Result   IMPRESSION: Cardiomediastinal silhouette is within normal limits in   size. Hyperinflated lungs compatible with COPD/emphysema with similar   chronic reticular changes in the lung bases. No new, focal airspace   disease, pleural effusion or pneumothorax. No acute bony abnormality.    Report per radiology    Laboratory:  Labs Ordered and Resulted from Time of ED Arrival to Time of ED Departure   COMPREHENSIVE METABOLIC PANEL - Abnormal       Result Value    Sodium 133      Potassium 4.9      Chloride 102      Carbon Dioxide (CO2) 22      Anion Gap 9      Urea Nitrogen 37 (*)     Creatinine 1.53 (*)     Calcium 9.3      Glucose 106 (*)     Alkaline Phosphatase 122      AST 61 (*)     ALT 22      Protein Total 7.7      Albumin 3.2 (*)     Bilirubin Total 1.1      GFR Estimate 43 (*)    ROUTINE UA WITH MICROSCOPIC - Abnormal    Color Urine Yellow      Appearance Urine Slightly Cloudy (*)     Glucose Urine Negative      Bilirubin Urine Negative      Ketones Urine Trace (*)     Specific Gravity Urine 1.013      Blood Urine Large (*)     pH Urine 6.5      Protein Albumin Urine 200  (*)     Urobilinogen Urine Normal      Nitrite Urine Positive (*)     Leukocyte Esterase Urine Large (*)     Bacteria Urine Many (*)     WBC Clumps Urine Present (*)     Mucus Urine Present (*)     RBC Urine 36 (*)     WBC Urine 155 (*)     Squamous Epithelials Urine <1     CBC WITH PLATELETS AND DIFFERENTIAL - Abnormal    WBC Count 7.1      RBC Count 3.25 (*)     Hemoglobin 9.8 (*)     Hematocrit 29.9 (*)     MCV 92      MCH 30.2      MCHC 32.8      RDW 15.2 (*)     Platelet Count 70 (*)     % Neutrophils 86      % Lymphocytes 5      % Monocytes 6      % Eosinophils 1      % Basophils 0      % Immature Granulocytes 2      NRBCs per 100 WBC 0      Absolute Neutrophils 6.2      Absolute Lymphocytes 0.4 (*)     Absolute Monocytes 0.4      Absolute Eosinophils 0.1      Absolute Basophils 0.0      Absolute Immature Granulocytes 0.2      Absolute NRBCs 0.0     ISTAT GASES LACTATE VENOUS POCT - Abnormal    Lactic Acid POCT 0.7      Bicarbonate Venous POCT 26      O2 Sat, Venous POCT 78 (*)     pCO2V Venous POCT 49      pH Venous POCT 7.33      pO2 Venous POCT 46     TSH WITH FREE T4 REFLEX - Abnormal    TSH 8.72 (*)    TROPONIN  I - Normal    Troponin I High Sensitivity 14     COVID-19 VIRUS (CORONAVIRUS) BY PCR - Normal    SARS CoV2 PCR Negative     LIPASE - Normal    Lipase 81     T4 FREE - Normal    Free T4 1.30     CK TOTAL - Normal         BLOOD CULTURE   BLOOD CULTURE      Emergency Department Course:       Reviewed:  I reviewed nursing notes, vitals, past medical history and Care Everywhere    Assessments:  1152 I obtained history and examined the patient as noted above.   1431 I rechecked the patient and explained findings. I spoke with his wife, who is now at the bedside. She provided further details which are outlined in the HPI.    Consults:  1450 I spoke with Dr. Moody from the hospitalist service regarding the patient's presentation, findings here in the ED, and plan of care.     Interventions:  1335 NS 1 L IV  1457 Zosyn  4.5 g IV    Disposition:  The patient was admitted to the hospital under the care of Dr. Moody.     Impression & Plan     Medical Decision Making:  Franklyn Hein is a 89 year old male who presents to the emergency department for evaluation of increased generalized weakness and gross hematuria.  On evaluation here patient is profoundly hypothermic.  His wife attributes this to him being undressed and profoundly diaphoretic.  This presentation could be consistent with bacterial sepsis so we have treated aggressively with Zosyn.  Patient has given warmed IV fluids and a bear hugger.  Urine shows obvious signs of infection but other labs are unremarkable.  Wife indicates that caring for Franklyn at home has become essentially untenable.  Patient will be admitted to the hospital.  Dr. Moody has begun discussion of goals of care with the family.  It sounds like discharge home is not likely to be satisfactory.    Diagnosis:    ICD-10-CM    1. Urinary tract infection with hematuria, site unspecified  N39.0     R31.9    2. Hypothermia, initial encounter  T68.XXXA      Scribe Disclosure:  Carmita BALLESTEROS am  serving as a scribe at 11:57 AM on 6/2/2022 to document services personally performed by Catie Fang MD based on my observations and the provider's statements to me.      Catie Fang MD  06/02/22 6698

## 2022-06-02 NOTE — ED NOTES
[facility]  ED Nurse Handoff Report    Franklyn Hein is a 89 year old male   ED Chief complaint: Hematuria  . ED Diagnosis:   Final diagnoses:   Urinary tract infection with hematuria, site unspecified   Hypothermia, initial encounter     Allergies: No Known Allergies    Code Status: Full Code  Activity level - Baseline/Home:  Assist X 2. Activity Level - Current:   Total Care. Lift room needed: Yes. Bariatric: No   Needed: No   Isolation: No. Infection: Not Applicable.     Vital Signs:   Vitals:    06/02/22 1420 06/02/22 1440 06/02/22 1500 06/02/22 1520   BP: (!) 178/90 (!) 175/99 (!) 175/104 (!) 166/91   Pulse: 71 72 74 76   Resp: 14 13 10 10   Temp: (!) 89.06  F (31.7  C) (!) 89.42  F (31.9  C) (!) 89.96  F (32.2  C) (!) 90.5  F (32.5  C)   TempSrc:       SpO2:           Cardiac Rhythm:  ,      Pain level:    Patient confused: Yes. Patient Falls Risk: Yes.   Elimination Status: Urethral catheter (garcia) in place; refer to orders to discontinue as per protocol    Patient Report - Initial Complaint: hematuria. Focused Assessment: pt arrived due to blood in urine, pt found to be 88F rectally. Pt UTI.    Tests Performed: see results. Abnormal Results: see results.  Labs Ordered and Resulted from Time of ED Arrival to Time of ED Departure   COMPREHENSIVE METABOLIC PANEL - Abnormal       Result Value    Sodium 133      Potassium 4.9      Chloride 102      Carbon Dioxide (CO2) 22      Anion Gap 9      Urea Nitrogen 37 (*)     Creatinine 1.53 (*)     Calcium 9.3      Glucose 106 (*)     Alkaline Phosphatase 122      AST 61 (*)     ALT 22      Protein Total 7.7      Albumin 3.2 (*)     Bilirubin Total 1.1      GFR Estimate 43 (*)    ROUTINE UA WITH MICROSCOPIC - Abnormal    Color Urine Yellow      Appearance Urine Slightly Cloudy (*)     Glucose Urine Negative      Bilirubin Urine Negative      Ketones Urine Trace (*)     Specific Gravity Urine 1.013      Blood Urine Large (*)     pH Urine  6.5      Protein Albumin Urine 200  (*)     Urobilinogen Urine Normal      Nitrite Urine Positive (*)     Leukocyte Esterase Urine Large (*)     Bacteria Urine Many (*)     WBC Clumps Urine Present (*)     Mucus Urine Present (*)     RBC Urine 36 (*)     WBC Urine 155 (*)     Squamous Epithelials Urine <1     CBC WITH PLATELETS AND DIFFERENTIAL - Abnormal    WBC Count 7.1      RBC Count 3.25 (*)     Hemoglobin 9.8 (*)     Hematocrit 29.9 (*)     MCV 92      MCH 30.2      MCHC 32.8      RDW 15.2 (*)     Platelet Count 70 (*)     % Neutrophils 86      % Lymphocytes 5      % Monocytes 6      % Eosinophils 1      % Basophils 0      % Immature Granulocytes 2      NRBCs per 100 WBC 0      Absolute Neutrophils 6.2      Absolute Lymphocytes 0.4 (*)     Absolute Monocytes 0.4      Absolute Eosinophils 0.1      Absolute Basophils 0.0      Absolute Immature Granulocytes 0.2      Absolute NRBCs 0.0     ISTAT GASES LACTATE VENOUS POCT - Abnormal    Lactic Acid POCT 0.7      Bicarbonate Venous POCT 26      O2 Sat, Venous POCT 78 (*)     pCO2V Venous POCT 49      pH Venous POCT 7.33      pO2 Venous POCT 46     TSH WITH FREE T4 REFLEX - Abnormal    TSH 8.72 (*)    TROPONIN I - Normal    Troponin I High Sensitivity 14     COVID-19 VIRUS (CORONAVIRUS) BY PCR - Normal    SARS CoV2 PCR Negative     LIPASE   T4 FREE   BLOOD CULTURE   BLOOD CULTURE     XR Chest Port 1 View   Final Result   IMPRESSION: Cardiomediastinal silhouette is within normal limits in   size. Hyperinflated lungs compatible with COPD/emphysema with similar   chronic reticular changes in the lung bases. No new, focal airspace   disease, pleural effusion or pneumothorax. No acute bony abnormality.      AZRA DOYLE MD            SYSTEM ID:  [id number]         Treatments provided: see MAR  Family Comments: wife at bedside  OBS brochure/video discussed/provided to patient:  N/A  ED Medications:   Medications   sodium chloride (PF) 0.9% PF flush 3 mL (has no  administration in time range)   sodium chloride (PF) 0.9% PF flush 3 mL (has no administration in time range)   0.9% sodium chloride BOLUS (has no administration in time range)   sodium chloride 0.9% infusion (1,000 mLs Intravenous New Bag 6/2/22 1335)   piperacillin-tazobactam (ZOSYN) intermittent infusion 4.5 g (4.5 g Intravenous New Bag 6/2/22 1457)     Drips infusing:  No  For the majority of the shift, the patient's behavior Green. Interventions performed were n/a.    Sepsis treatment initiated: No     Patient tested for COVID 19 prior to admission: YES    ED Nurse Name/Phone Number: Pema Angel RN,   3:37 PM  RECEIVING UNIT ED HANDOFF REVIEW    Above ED Nurse Handoff Report was reviewed: Yes  Reviewed by: Abril Pennington RN on June 2, 2022 at 6:15 PM

## 2022-06-02 NOTE — H&P
[facility]  Hospitalist Admission Note  Name: Franklyn Hein    MRN: [id number]  YOB: 1932    Age: 89 year old  Date of admission: 6/2/2022  Primary care provider: Melvin Hoskins    Chief Complaint:  Hematuria    Assessment and Plan:   Sepsis  Hematuria  Urinary tract infection  Possible pyelonephritis:  AMS, hypothermia. Wife reports decline in mental status over past 1-2 weeks. Last night he was very restless and wouldn't sleep. When she woke up, he was naked, cold, and covered in sweat. His depends were stained with red urine. Urinalysis with evidence for UTI. Associated DONTE with possible pyelonephritis. Blood cultures were drawn and he was initiated on zosyn.   -Continue zosyn  -MIVF at 100 ml/hr  -F/u urine cultures  -CK to rule out myoglobinuria    Hypothermia, moderate:  Presents with temp of 88F.  Per report, patient is usually mildly hypothermic at baseline but at around 96F.  Suspect related to the above infection. Possible component of dementia. Thyroid function normal, glucose normal.   -AM cortisol  -Treat infection as above  -Receiving warmed fluids  -Bear hugger  -Close monitoring of electrolytes with rewarming    Acute kidney injury:  Suspect pre-renal given poor PO intake. Also possible intra renal injury given infection.   -MIVF at 100 mL/hr  -Avoid nephrotoxins  -BMP in AM    Thrombocytopenia  Normocytic anemia:  No report of bleeding. Stools have not been black or bloody. Suspect chronically malnourished. Also possible acute reduction in the setting of sepsis/infection.   -Repeat CBC tonight and again in AM  -Hold VTE ppx for now until trend is further known    Prolonged qtc:  Suspect related to hypothermia.   -Rewarming as above    Dysphagia:  Family reports that he will occasionally choke on food at times at home.  -SLP evaluation  -I have ordered for diet to be advanced as tolerated but the patient should always have someone at bedside to assess swallowing  function and monitor for evidence of any aspiration    GOC:  I had a long discussion with daughter and wife (who is medical decision maker). They have seemingly been trying to get him into memory care unit vs hospice for the past couple of months. They have had no luck as he doesn't have enough needs? I broached the topic of whether Alex would even want antibiotics to treat the current infection. To this they questioned ultimate goals of care. They recognize that his quality of life is minimal and that he has been suffering with everything going on and that he wouldn't want to live this way. However, they were not yet ready to say that it was time for comfort cares/hospice. They are hoping for some time to further discuss this with other siblings. They agree that Alex would not want heroic measures such as intubation, CPR, shocks, or ICU level cares. They agree that if he were to need these things they would want to have further discussion about comfort cares.       Clinically Significant Risk Factors Present on Admission             # Hypoalbuminemia: Albumin = 3.2 g/dL (Ref range: 3.4 - 5.0 g/dL) on admission, will monitor as appropriate    # Thrombocytopenia: Plts = 70 10e3/uL (Ref range: 150 - 450 10e3/uL) on admission, will monitor for bleeding         DVT Prophylaxis: Pneumatic Compression Devices  Code Status: DNR / DNI  Discharge Dispo: Pending improvement  Estimated Disch Date / # of Days until Disch: Anticipate at least 3-5 day hospitalization. Possible placement to memory care or hospice on discharge.       History of Present Illness:  Franklyn Hein is a 89 year old male with PMH including Lewy body dementia, type 2 diabetes mellitus, hypertension, COPD, cancer, anemia who presents with hematuria.    Wife reports that Naresh has been declining over the past couple years with a more significant decline in the past 1 to 2 weeks.  She reports that he is occasionally able to talk to you and answer simple  questions.  Over the past couple weeks this is become less and less common.  She also reports that he has been picking at things more frequently.  1 example she gives is he was picking up the curtains all night last night.  She brings him in today because over the past 24 hours he has been extremely restless.  She reports he did not sleep well last night as he was constantly getting up and pacing the room and pulling at the curtains.  She reports that when she woke up she felt that he was extremely cold, diaphoretic.  She changed his depends and noted that he had a rust colored urine.  At that point she decided to bring him into the hospital.    ED work-up is most notable for a temperature of 88  F.  Respiratory rate in the single digits.  Heart rate and blood pressure are normal.  He is saturating fine on room air.  BMP is most notable for an acute kidney injury with creatinine 1.5.  CBC is notable for anemia to 9, thrombocytopenia to 70.  Urinalysis is consistent with proteinuria, urinary tract infection.  Chest x-ray is clear.  Troponin is normal, LFTs are normal, TSH is elevated but with normal T4.  He was initiated on IV fluids, rewarming was initiated, and he was placed on IV Zosyn.  I was asked to admit him given sepsis.     Past Medical History:  Past Medical History:   Diagnosis Date     AAA (abdominal aortic aneurysm) (H)      Anemia     macrocytic     Ataxia      Cancer (H)      COPD (chronic obstructive pulmonary disease) (H)      Diabetes (H)     type 2     Dyspnea      Fatigue      Hypercholesteremia      Hypertension      Multiple myeloma (H)      Nocturia      Pulmonary fibrosis (H)      Tubular adenoma 2007     Past Surgical History:  Past Surgical History:   Procedure Laterality Date     APPENDECTOMY       COLONOSCOPY       ENDOVASCULAR REPAIR ANEURYSM ABDOMINAL AORTA N/A 9/29/2017    Procedure: ENDOVASCULAR REPAIR ANEURYSM ABDOMINAL AORTA;  EMBOLIZATION LEFT INTERNAL ILIAC ARTERY WITH AMPLATZER  PLUG; ENDOVASCULAR ABDOMINAL AORTIC ANEURYMS REPAIR;  Surgeon: Juanito Landaverde MD;  Location:  OR     ESOPHAGOSCOPY, GASTROSCOPY, DUODENOSCOPY (EGD), COMBINED N/A 10/30/2017    Procedure: COMBINED ESOPHAGOSCOPY, GASTROSCOPY, DUODENOSCOPY (EGD), BIOPSY SINGLE OR MULTIPLE;  ESOPHAGOSCOPY, GASTROSCOPY, DUODENOSCOPY (EGD)  with esophageal bxs;  Surgeon: Adilene Phipps MD;  Location:  GI     EYE SURGERY      Cataract Phacoemulsification     VASCULAR SURGERY      AAA Aortic Aneurysm Repair     Social History:  Social History     Tobacco Use     Smoking status: Former Smoker     Types: Cigarettes     Quit date: 1996     Years since quittin.6     Smokeless tobacco: Never Used   Substance Use Topics     Alcohol use: No     Comment: none currently     Social History     Social History Narrative     Not on file     Family History:  Family History   Problem Relation Age of Onset     Diabetes Mother      Hyperlipidemia Mother      Hypertension Mother      Diabetes Father      Diabetes Brother      Diabetes Sister      Allergies:  No Known Allergies  Medications:  (Not in a hospital admission)    Review of Systems:  A Comprehensive greater than 10 system review of systems was carried out.  Pertinent positives and negatives are noted above.  Otherwise negative for contributory information.     Physical Exam:  Blood pressure (!) 175/99, pulse 72, temperature (!) 89.42  F (31.9  C), resp. rate 13, SpO2 100 %.  Wt Readings from Last 1 Encounters:   18 51.6 kg (113 lb 11.2 oz)     Exam:  General: Alert, awake, no acute distress.  Patient does not answer any questions and is not participatory in the HPI.  He is responsive to voice and tactile stimuli.  Otherwise he is mostly picking at his gown, cardiac telemetry leads.  He is extremely emaciated with significant temporal wasting.  HEENT: NC/AT, eyes anicteric, external occular movements intact, face symmetric.  Dentition WNL, MM dry  Cardiac: RRR,  S1, S2.  No murmurs appreciated.  Pulmonary: Normal chest rise, normal work of breathing.  Lungs CTA BL  Abdomen: soft, non-tender, non-distended.  He does seem to contract his abdominal muscles when I palpate his belly however it is unclear if this is related to pain versus tactile stimuli.  Suspect the latter.  Bowel Sounds Present.  No guarding.  Extremities: no deformities.  Warm, well perfused.  He has mild edema of the bilateral lower extremities.  Skin: no rashes or lesions noted.  Warm and Dry.  Neuro: No focal deficits noted.  He does not speak or respond.  He is moving all extremities spontaneously.  Psych: Appropriate affect.    Data:  EKG:  NSR, prolonged qtc, mildly prolonged QRS.   Imaging:  Recent Results (from the past 24 hour(s))   XR Chest Port 1 View    Narrative    XR CHEST PORT 1 VIEW   6/2/2022 1:56 PM     HISTORY: SEPSIS    COMPARISON: Radiograph 9/29/2017      Impression    IMPRESSION: Cardiomediastinal silhouette is within normal limits in  size. Hyperinflated lungs compatible with COPD/emphysema with similar  chronic reticular changes in the lung bases. No new, focal airspace  disease, pleural effusion or pneumothorax. No acute bony abnormality.    AZRA DOYLE MD         SYSTEM ID:  [id number]     Labs:  Recent Labs   Lab 06/02/22  1232   WBC 7.1   HGB 9.8*   HCT 29.9*   MCV 92   PLT 70*          Lab Results   Component Value Date     06/02/2022     03/21/2018     03/20/2018     03/19/2018    Lab Results   Component Value Date    CHLORIDE 102 06/02/2022    CHLORIDE 99 03/21/2018    CHLORIDE 96 03/20/2018    CHLORIDE 88 03/19/2018    Lab Results   Component Value Date    BUN 37 06/02/2022    BUN 11 03/21/2018    BUN 15 03/20/2018    BUN 19 03/19/2018      Lab Results   Component Value Date    POTASSIUM 4.9 06/02/2022    POTASSIUM 4.0 03/21/2018    POTASSIUM 4.0 03/20/2018    POTASSIUM 4.0 03/19/2018    Lab Results   Component Value Date    CO2 22 06/02/2022    CO2  24 03/21/2018    CO2 25 03/20/2018    CO2 25 03/19/2018    Lab Results   Component Value Date    CR 1.53 06/02/2022    CR 0.79 03/21/2018    CR 0.84 03/20/2018    CR 0.90 03/19/2018            Demar Moody DO  [facility]

## 2022-06-02 NOTE — ED TRIAGE NOTES
Pt arrives for blood and urine in brief this AM by wife. Pt incontinent has not been eating or drinking. Pt hx of dementia, DM, nonverbal can be combative.      Triage Assessment     Row Name 06/02/22 1154       Triage Assessment (Adult)    Airway WDL WDL       Respiratory WDL    Respiratory WDL WDL       Skin Circulation/Temperature WDL    Skin Circulation/Temperature WDL WDL       Cardiac WDL    Cardiac WDL WDL       Cognitive/Neuro/Behavioral WDL    Cognitive/Neuro/Behavioral WDL X  known dementia        [place] Coma Scale    Best Eye Response 4-->(E4) spontaneous    Best Motor Response 5-->(M5) localizes pain    Best Verbal Response 4-->(V4) confused    [place] Coma Scale Score 13

## 2022-06-02 NOTE — PHARMACY-ADMISSION MEDICATION HISTORY
Admission medication history interview status for this patient is complete. See [facility] admission navigator for allergy information, prior to admission medications and immunization status.     Medication history interview done, indicate source(s): Patient's daughter  Medication history resources (including written lists, pill bottles, clinic record):Sure scripts  Pharmacy: [facility]    Changes made to PTA medication list:  Added: See list  Changed: -  Reported as Not Taking: -  Removed: -    Actions taken by pharmacist (provider contacted, etc):Called daughter for medication history    Additional medication history information:None    Medication reconciliation/reorder completed by provider prior to medication history?  N      Prior to Admission medications    Medication Sig Last Dose Taking? Auth Provider   acetaminophen (TYLENOL) 325 MG tablet Take 650 mg by mouth every 6 hours 6/2/2022 at am Yes Unknown, Entered By History   OLANZapine (ZYPREXA) 5 MG tablet Take 2.5 mg by mouth daily 6/1/2022 at Unknown time Yes Unknown, Entered By History   QUEtiapine (SEROQUEL) 25 MG tablet Take 25 mg by mouth At Bedtime 6/1/2022 at Unknown time Yes Unknown, Entered By History

## 2022-06-02 NOTE — ED NOTES
Bed: ED24  Expected date: 6/2/22  Expected time: 11:41 AM  Means of arrival:   Comments:  [facility] 89 yr M Blood in urine

## 2022-06-03 NOTE — CONSULTS
[facility]  Palliative Care Consultation   Text Page    Assessment & Plan   Franklyn Hein is a 89 year old male who was admitted on 6/2/2022.   Consulted by Dr. Moody to assist with goals of care regarding MCU placement v Hospice and development of plan of care.    Recommendations:  1. Goals of Care- No CPR- Do NOT Intubate  Hospitalization goals discussed with wife and daughter.  Confirmed NO heroic measures, DNR, Do NOT intubate.  Goal for comfort and discharge onto hospice.  Decisional Capacity- Unreliable. Patient does not have an advance directive. Per  informed consent policy next of kin should be involved in all consent and decision making. Patient's wife Sharonda Hein is next of kin.  POLST none on file.  Would recommend completion prior to discharge.  - comfort measures orderset placed  - facilitation with SW for discharge to facility on hospice.    2. Malnutrition, severe protein deficient  Poor oral intake at baseline.  Cachexia and muscle wasting on assessment. BMI < 20. Currently mental status is such that patient is not safe for PO nutrition.  - food for comfort.    3. Dysphagia  Frequent choking episodes with oral intake per family.  - food for comfort.    4. Frailty  Weakness, decline in functional decline, decreased level of physical activity, reported fatigue and weight loss.  - bedrest with turns for comfort.    5. Spiritual Care  Oriented to Spiritual Health as part of Palliative Care team.   consult placed to facilitate emotional support via scripture and for sacrament provision.  Spiritual Background: Ho[employer]ism ([employer] [place])    6. Care Planning  Appreciate Care of multidisciplinary team.  - discharge to facility on Hospice     Medical Decision Making and Goals of Care:  Discussed on Keturah 3, 2022 with OCTAVIO Moe CNP:   Met with Wife Sharonda and son Jairo at the bedside.  Franklyn sleeping and unable to participate in goals of care  discussion.  Reviewed their recent understanding of Alex' functioning at home and medical state.  Sharonda reviewed her experience of caring for him at home.  She stated that she changed him, and tended to his activities of daily living such as bathing, feeding and personal needs.  He slept in his recliner and would have restlessness at nights which would interrupt her sleep.  She states that he had been less verbal in recent months, and that she had needed to modify his diet and would feed him mashed/soft food to prevent him from choking or having difficulty with intake.  She reviewed her understanding of his disease process and that it could not be reversed by medical interventions.  She stated that managing his restless agitation, discomfort or shortness of breath would be her preference.    Jairo added that she knew his father would not want to live this way and that as a family they valued his dignity.      Sharonda reviewed his health care wishes and the pride he took in his independence and family.  She reflected on their legacy and their achievements in 64 years of marriage. She reflected on their dulce and Alex' transition to [employer]ism from [employer] [facility] the [place] [facility].  She requested a  visit for spiritual support and sacrament.       Reviewed and educated the Comfort focused care plan, discussed the de-escalation of medications and interventions such as IV fluids and antibiotics.  Discussed the focus on symptom management and physical assessments by nursing staff to tend to Alex in order to meet those needs. Sharonda verbalized understanding of this plan and agreement.  Discussed the need to facilitate discharge with social work and his needs of a facility bed and hospice enrollment in order to maintain comfort.  Appropriate questions and concerns were raised, deferred details to Social work.      Outlined plan for comfort measures, placement into facility on hospice to be facilitated with BRYANT  and symptom management as primary goal for the duration of his hospital stay. Provided with contact information for palliative care for ongoing support.    Thank you for involving us in the patient's care.     OCTAVIO Moe CNP  Pain Management and Palliative Care  [facility]  Pgr: [phone]    Time Spent on this Encounter   Total unit/floor time 112 minutes, time consisted of the following, examination of the patient, reviewing the record and completing documentation. >50% of time spent in counseling and coordination of care, Bedside Nurse Betina, Hospitalist Dr. Moody and  Yaz.  Time spend counseling with patient and family consisted of the following topics, goals of care, advance care planning, education about prognosis, care planning for discharge and symptom management.    Understanding of disease process:   This has been discussed with patient and family, primary team.    History of Present Illness   History is obtained from the patient's family and electronic health record    Franklyn Hein is a 89 year old male with a past medical history of Lewy body dementia, type 2 diabetes, hypertension, COPD, cancer, anemia, who presents with hematuria and acute decline x 1-2 weeks.  Per wife's report, he has had 24 hours of increased restlessness.  She reports that he had an episode of undressing himself overnight and was diaphoretic and cold, he had dark, rust colored urine and she subsequently brought him to the hospital.    This is in the setting of decreased verbalization, picking at things, progressive weakness, decreased functional status overall.  He has had no recent hospitalizations. There has been a decline in daily function as reported by patient's family.  There is a reported unitentional weight loss due to decreased PO intake.     Past Medical History   I have reviewed this patient's medical history and updated it with pertinent information if needed.    Past Medical History:   Diagnosis Date     AAA (abdominal aortic aneurysm) (H)      Anemia     macrocytic     Ataxia      Cancer (H)      COPD (chronic obstructive pulmonary disease) (H)      Diabetes (H)     type 2     Dyspnea      Fatigue      Hypercholesteremia      Hypertension      Multiple myeloma (H)      Nocturia      Pulmonary fibrosis (H)      Tubular adenoma 2007       Past Surgical History   I have reviewed this patient's surgical history and updated it with pertinent information if needed.  Past Surgical History:   Procedure Laterality Date     APPENDECTOMY       COLONOSCOPY       ENDOVASCULAR REPAIR ANEURYSM ABDOMINAL AORTA N/A 9/29/2017    Procedure: ENDOVASCULAR REPAIR ANEURYSM ABDOMINAL AORTA;  EMBOLIZATION LEFT INTERNAL ILIAC ARTERY WITH AMPLATZER PLUG; ENDOVASCULAR ABDOMINAL AORTIC ANEURYMS REPAIR;  Surgeon: Juanito Landaverde MD;  Location:  OR     ESOPHAGOSCOPY, GASTROSCOPY, DUODENOSCOPY (EGD), COMBINED N/A 10/30/2017    Procedure: COMBINED ESOPHAGOSCOPY, GASTROSCOPY, DUODENOSCOPY (EGD), BIOPSY SINGLE OR MULTIPLE;  ESOPHAGOSCOPY, GASTROSCOPY, DUODENOSCOPY (EGD)  with esophageal bxs;  Surgeon: Adilene Phipps MD;  Location:  GI     EYE SURGERY      Cataract Phacoemulsification     VASCULAR SURGERY  2017    AAA Aortic Aneurysm Repair       Prior to Admission Medications   Prior to Admission Medications   Prescriptions Last Dose Informant Patient Reported? Taking?   OLANZapine (ZYPREXA) 5 MG tablet 6/1/2022 at Unknown time  Yes Yes   Sig: Take 2.5 mg by mouth daily   QUEtiapine (SEROQUEL) 25 MG tablet 6/1/2022 at Unknown time  Yes Yes   Sig: Take 25 mg by mouth At Bedtime   acetaminophen (TYLENOL) 325 MG tablet 6/2/2022 at am  Yes Yes   Sig: Take 650 mg by mouth every 6 hours      Facility-Administered Medications: None     Allergies   No Known Allergies    Social History   I have updated and reviewed the following Social History Narrative:   Social History     Social History Narrative      Not on file           Living situation: home with spouse       Support system: spouse, adult children       Actual/Potential Caregiver: spouse       Functional status: dependent with ADLs at home  History of substance use/abuse: no    Family History   I have reviewed this patient's family history and updated it with pertinent information if needed.   Family History   Problem Relation Age of Onset     Diabetes Mother      Hyperlipidemia Mother      Hypertension Mother      Diabetes Father      Diabetes Brother      Diabetes Sister        Review of Systems   Review of systems not obtained due to patient factors - confusion    FAST score: 7d (baseline), can no longer walk distances, requires recliner to sit/sleep, full assist with PO intake, incomprehensible vocalizations.  Now with somnolence on exam.  PPS: 20 - 30 % bed/recliner bound, dependent for ADLs, reduced/minimal PO intake, somnolent at presentation to hospital.      Physical Exam   Temp:  [88.16  F (31.2  C)-98.4  F (36.9  C)] 96.5  F (35.8  C)  Pulse:  [] 97  Resp:  [8-20] 18  BP: (125-189)/() 125/57  SpO2:  [94 %-100 %] 95 %  117 lbs 0 oz  Exam:  GEN:  Somnolent, non-verbal,  NAD.  HEENT:  Normocephalic/atraumatic, no scleral icterus, no nasal discharge, mouth moist.    CV:  RRR, S1, S2; no murmurs or other irregularities noted.  +3 DP/PT pulses bilatererally; no edema BLE.  RESP:  Coarse to auscultation bilaterally.  Symmetric chest rise on inhalation noted.  Normal respiratory effort. Audible congested respirations  ABD:  soft, non-tender/non-distended.  +BS  EXT:  Edema & pulses as noted above.  CMS intact x 4.      SKIN:  Dry to touch, no exanthems noted in the visualized areas.    NEURO: unresponsive to assessment questions.    PAIN BEHAVIOR: NAD      Data   Results for orders placed or performed during the hospital encounter of 06/02/22 (from the past 24 hour(s))   CBC with platelets differential    Narrative    The following orders  were created for panel order CBC with platelets differential.  Procedure                               Abnormality         Status                     ---------                               -----------         ------                     CBC with platelets and d...[[id number]]  Abnormal            Final result                 Please view results for these tests on the individual orders.   Comprehensive metabolic panel   Result Value Ref Range    Sodium 133 133 - 144 mmol/L    Potassium 4.9 3.4 - 5.3 mmol/L    Chloride 102 94 - 109 mmol/L    Carbon Dioxide (CO2) 22 20 - 32 mmol/L    Anion Gap 9 3 - 14 mmol/L    Urea Nitrogen 37 (H) 7 - 30 mg/dL    Creatinine 1.53 (H) 0.66 - 1.25 mg/dL    Calcium 9.3 8.5 - 10.1 mg/dL    Glucose 106 (H) 70 - 99 mg/dL    Alkaline Phosphatase 122 40 - 150 U/L    AST 61 (H) 0 - 45 U/L    ALT 22 0 - 70 U/L    Protein Total 7.7 6.8 - 8.8 g/dL    Albumin 3.2 (L) 3.4 - 5.0 g/dL    Bilirubin Total 1.1 0.2 - 1.3 mg/dL    GFR Estimate 43 (L) >60 mL/min/1.73m2   Blood Culture Peripheral Blood    Specimen: Peripheral Blood   Result Value Ref Range    Culture No growth after 12 hours    CBC with platelets and differential   Result Value Ref Range    WBC Count 7.1 4.0 - 11.0 10e3/uL    RBC Count 3.25 (L) 4.40 - 5.90 10e6/uL    Hemoglobin 9.8 (L) 13.3 - 17.7 g/dL    Hematocrit 29.9 (L) 40.0 - 53.0 %    MCV 92 78 - 100 fL    MCH 30.2 26.5 - 33.0 pg    MCHC 32.8 31.5 - 36.5 g/dL    RDW 15.2 (H) 10.0 - 15.0 %    Platelet Count 70 (L) 150 - 450 10e3/uL    % Neutrophils 86 %    % Lymphocytes 5 %    % Monocytes 6 %    % Eosinophils 1 %    % Basophils 0 %    % Immature Granulocytes 2 %    NRBCs per 100 WBC 0 <1 /100    Absolute Neutrophils 6.2 1.6 - 8.3 10e3/uL    Absolute Lymphocytes 0.4 (L) 0.8 - 5.3 10e3/uL    Absolute Monocytes 0.4 0.0 - 1.3 10e3/uL    Absolute Eosinophils 0.1 0.0 - 0.7 10e3/uL    Absolute Basophils 0.0 0.0 - 0.2 10e3/uL    Absolute Immature Granulocytes 0.2 <=0.4 10e3/uL    Absolute  NRBCs 0.0 10e3/uL   Troponin I (now)   Result Value Ref Range    Troponin I High Sensitivity 14 <79 ng/L   Lipase   Result Value Ref Range    Lipase 81 73 - 393 U/L   CK total   Result Value Ref Range     30 - 300 U/L   Extra Tube ([place] Draw)    Narrative    The following orders were created for panel order Extra Tube ([place] Draw).  Procedure                               Abnormality         Status                     ---------                               -----------         ------                     Extra Blue Top Tube[[id number]]                              Final result               Extra Red Top Tube[[id number]]                               Final result               Extra Heparinized Syringe[[id number]]                        Final result                 Please view results for these tests on the individual orders.   Extra Blue Top Tube   Result Value Ref Range    Hold Specimen JIC    Extra Red Top Tube   Result Value Ref Range    Hold Specimen JIC    Extra Heparinized Syringe   Result Value Ref Range    Hold Specimen JIC    TSH with free T4 reflex   Result Value Ref Range    TSH 8.72 (H) 0.40 - 4.00 mU/L   T4 free   Result Value Ref Range    Free T4 1.30 0.76 - 1.46 ng/dL   iStat Gases (lactate) venous, POCT   Result Value Ref Range    Lactic Acid POCT 0.7 <=2.0 mmol/L    Bicarbonate Venous POCT 26 21 - 28 mmol/L    O2 Sat, Venous POCT 78 (L) 94 - 100 %    pCO2V Venous POCT 49 40 - 50 mm Hg    pH Venous POCT 7.33 7.32 - 7.43    pO2 Venous POCT 46 25 - 47 mm Hg   EKG 12 lead   Result Value Ref Range    Systolic Blood Pressure  mmHg    Diastolic Blood Pressure  mmHg    Ventricular Rate 69 BPM    Atrial Rate 69 BPM    MA Interval 196 ms    QRS Duration 104 ms     ms    QTc 499 ms    P Axis 71 degrees    R AXIS 79 degrees    T Axis 109 degrees    Interpretation ECG       Sinus rhythm with Premature atrial complexes with Aberrant conduction  Nonspecific ST and T wave abnormality  Prolonged  QT  Abnormal ECG  When compared with ECG of 19-MAR-2018 12:43,  Aberrant conduction is now Present  Confirmed by - EMERGENCY ROOM, PHYSICIAN ([id number]),  RENATE BROOKS ([id number]) on 6/3/2022 6:41:18 AM     UA with Microscopic   Result Value Ref Range    Color Urine Yellow Colorless, Straw, Light Yellow, Yellow    Appearance Urine Slightly Cloudy (A) Clear    Glucose Urine Negative Negative mg/dL    Bilirubin Urine Negative Negative    Ketones Urine Trace (A) Negative mg/dL    Specific Gravity Urine 1.013 1.003 - 1.035    Blood Urine Large (A) Negative    pH Urine 6.5 5.0 - 7.0    Protein Albumin Urine 200  (A) Negative mg/dL    Urobilinogen Urine Normal Normal, 2.0 mg/dL    Nitrite Urine Positive (A) Negative    Leukocyte Esterase Urine Large (A) Negative    Bacteria Urine Many (A) None Seen /HPF    WBC Clumps Urine Present (A) None Seen /HPF    Mucus Urine Present (A) None Seen /LPF    RBC Urine 36 (H) <=2 /HPF    WBC Urine 155 (H) <=5 /HPF    Squamous Epithelials Urine <1 <=1 /HPF   Asymptomatic COVID-19 Virus (Coronavirus) by PCR Nasopharyngeal    Specimen: Nasopharyngeal; Swab   Result Value Ref Range    SARS CoV2 PCR Negative Negative    Narrative    Testing was performed using the Xpert Xpress SARS-CoV-2 Assay on the   Cepheid Gene-Xpert Instrument Systems. Additional information about   this Emergency Use Authorization (EUA) assay can be found via the Lab   Guide. This test should be ordered for the detection of SARS-CoV-2 in   individuals who meet SARS-CoV-2 clinical and/or epidemiological   criteria. Test performance is unknown in asymptomatic patients. This   test is for in vitro diagnostic use under the FDA EUA for   laboratories certified under CLIA to perform high complexity testing.   This test has not been FDA cleared or approved. A negative result   does not rule out the presence of PCR inhibitors in the specimen or   target RNA in concentration below the limit of detection for the   assay. The  possibility of a false negative should be considered if   the patient's recent exposure or clinical presentation suggests   COVID-19. This test was validated by the [facility]. This laboratory is certified under the Clinical Laboratory Improvement Amendments of 1988 (CLIA-88) as qualified to perform high complexity laboratory testing.     XR Chest Port 1 View    Narrative    XR CHEST PORT 1 VIEW   6/2/2022 1:56 PM     HISTORY: SEPSIS    COMPARISON: Radiograph 9/29/2017      Impression    IMPRESSION: Cardiomediastinal silhouette is within normal limits in  size. Hyperinflated lungs compatible with COPD/emphysema with similar  chronic reticular changes in the lung bases. No new, focal airspace  disease, pleural effusion or pneumothorax. No acute bony abnormality.    AZRA DOYLE MD         SYSTEM ID:  [id number]   Blood Culture Peripheral Blood    Specimen: Peripheral Blood   Result Value Ref Range    Culture No growth after 12 hours    CBC with platelets   Result Value Ref Range    WBC Count 5.8 4.0 - 11.0 10e3/uL    RBC Count 3.20 (L) 4.40 - 5.90 10e6/uL    Hemoglobin 9.5 (L) 13.3 - 17.7 g/dL    Hematocrit 29.3 (L) 40.0 - 53.0 %    MCV 92 78 - 100 fL    MCH 29.7 26.5 - 33.0 pg    MCHC 32.4 31.5 - 36.5 g/dL    RDW 15.3 (H) 10.0 - 15.0 %    Platelet Count 57 (L) 150 - 450 10e3/uL   Basic metabolic panel   Result Value Ref Range    Sodium 135 133 - 144 mmol/L    Potassium 4.8 3.4 - 5.3 mmol/L    Chloride 105 94 - 109 mmol/L    Carbon Dioxide (CO2) 21 20 - 32 mmol/L    Anion Gap 9 3 - 14 mmol/L    Urea Nitrogen 36 (H) 7 - 30 mg/dL    Creatinine 1.61 (H) 0.66 - 1.25 mg/dL    Calcium 8.9 8.5 - 10.1 mg/dL    Glucose 39 (LL) 70 - 99 mg/dL    GFR Estimate 41 (L) >60 mL/min/1.73m2   Magnesium   Result Value Ref Range    Magnesium 2.1 1.6 - 2.3 mg/dL   Glucose by meter   Result Value Ref Range    GLUCOSE BY METER POCT 32 (LL) 70 - 99 mg/dL   Glucose by meter   Result Value Ref Range     GLUCOSE BY METER POCT 171 (H) 70 - 99 mg/dL   Glucose by meter   Result Value Ref Range    GLUCOSE BY METER POCT 90 70 - 99 mg/dL   CBC with platelets   Result Value Ref Range    WBC Count 7.4 4.0 - 11.0 10e3/uL    RBC Count 3.01 (L) 4.40 - 5.90 10e6/uL    Hemoglobin 8.8 (L) 13.3 - 17.7 g/dL    Hematocrit 27.3 (L) 40.0 - 53.0 %    MCV 91 78 - 100 fL    MCH 29.2 26.5 - 33.0 pg    MCHC 32.2 31.5 - 36.5 g/dL    RDW 15.9 (H) 10.0 - 15.0 %    Platelet Count 64 (L) 150 - 450 10e3/uL   Basic metabolic panel   Result Value Ref Range    Sodium 138 133 - 144 mmol/L    Potassium 4.6 3.4 - 5.3 mmol/L    Chloride 108 94 - 109 mmol/L    Carbon Dioxide (CO2) 22 20 - 32 mmol/L    Anion Gap 8 3 - 14 mmol/L    Urea Nitrogen 36 (H) 7 - 30 mg/dL    Creatinine 1.77 (H) 0.66 - 1.25 mg/dL    Calcium 8.7 8.5 - 10.1 mg/dL    Glucose 90 70 - 99 mg/dL    GFR Estimate 36 (L) >60 mL/min/1.73m2

## 2022-06-03 NOTE — CONSULTS
Care Management Initial Consult    General Information  Assessment completed with: Spouse or significant other, Children,  (Jairo Mcdonough, & Jennifer)  Type of CM/SW Visit: Offer D/C Planning    Primary Care Provider verified and updated as needed:     Readmission within the last 30 days:        Reason for Consult: discharge planning  Advance Care Planning:            Communication Assessment  Patient's communication style: spoken language (English or Bilingual)    Hearing Difficulty or Deaf: no   Wear Glasses or Blind: no    Cognitive  Cognitive/Neuro/Behavioral: .WDL except  Level of Consciousness: lethargic, somnolent  Arousal Level: arouses to repeated stimulation  Orientation: other (see comments) (ELLIE)  Mood/Behavior: calm  Best Language:  (ELLIE)  Speech: incomprehensible sounds    Living Environment:   People in home: spouse     Current living Arrangements: house      Able to return to prior arrangements: no       Family/Social Support:  Care provided by: spouse/significant other  Provides care for: no one, unable/limited ability to care for self  Marital Status:   Wife, Children   (Sharonda)       Description of Support System: Supportive, Involved         Current Resources:   Patient receiving home care services: No     Community Resources:    Equipment currently used at home: cane, straight  Supplies currently used at home:      Employment/Financial:  Employment Status:          Financial Concerns:             Lifestyle & Psychosocial Needs:  Social Determinants of Health     Tobacco Use: Not on file   Alcohol Use: Not on file   Financial Resource Strain: Not on file   Food Insecurity: Not on file   Transportation Needs: Not on file   Physical Activity: Not on file   Stress: Not on file   Social Connections: Not on file   Intimate Partner Violence: Not on file   Depression: Not on file   Housing Stability: Not on file       Functional Status:  Prior to admission patient needed assistance:               Mental Health Status:          Chemical Dependency Status:                Values/Beliefs:  Spiritual, Cultural Beliefs, Oriental orthodox Practices, Values that affect care:                 Additional Information:  BRYANT was updated by palliative care RN that patient/family have elected to pursue comfort cares & hospice. BRYANT met with patient's spouse, Sharonda, son, Lalitha, & daughter, Jennifer to discuss hospice & discharge planning. They are undecided on a hospice company at this time & voiced they will wait to see where patient is placed. Sharonda requested a referral be sent to [facility] in [place] as she shared he was accepted to there facility in the past & they know [facility]. BRYANT also provided the Department [facility] contracted nursing home list & encouraged them to review the list together to choose additional options to send referrals to. BRYANT discussed [facility]. Sharonda & family were agreeable to application being completed & patient being placed on the waiting list. SW completed application & faxed it to [facility] on this date.   Family continues to review additional options for referrals. BRYANT will continue to follow to assist with discharge planning.     SOILA Nelson

## 2022-06-03 NOTE — PLAN OF CARE
Goal Outcome Evaluation:  End of Shift Summary  For vital signs and complete assessments, please see documentation flowsheets.     Pertinent assessments: Pt disoriented x4. VSS and temp within baseline. On RA.  No moaning noted during the shift.  Bear hugger applied for warmth. Beckford patent with adequate output. On tele and reading SR.     Major Shift Events Admitted to the unit.    Treatment Plan: IVF, IV zosyn, monitor symptoms.

## 2022-06-03 NOTE — PROGRESS NOTES
[facility]  Hospitalist Progress Note  Demar Moody,  06/03/22       Reason for Stay (Diagnosis): AMS         Assessment and Plan:      Summary of Stay: Franklyn Hein is a 89 year old male with PMH including Lewy body dementia, type 2 diabetes mellitus, hypertension, COPD, cancer, anemia who presents with hematuria.    Problem List/Assessment and Plan:   Sepsis  Hematuria  Urinary tract infection  Possible pyelonephritis:  AMS, hypothermia. Wife reports decline in mental status over past 1-2 weeks. Last night he was very restless and wouldn't sleep. When she woke up, he was naked, cold, and covered in sweat. His depends were stained with red urine. Urinalysis with evidence for UTI. Associated DONTE with possible pyelonephritis. Blood cultures were drawn and he was initiated on zosyn.   -Palliative care following, appreciate recommendation  -Transitioning to comfort cares and therefore discontinuing all life-prolonging medications    Lewy body dementia:  Seems that the patient has had a pretty poor quality of life over the past couple weeks to months.  He was diagnosed with Lewy body dementia years ago and has been slowly progressive since that time.  The wife reports that he largely does nothing anymore besides sit at home.  He rarely talks.  Does not eat very well.  Family has been trying to enroll him in hospice/memory care but have been unsuccessful as he is somewhat healthy other than the Lewy body dementia.  He is on no medications apart from Zyprexa/Seroquel.  -Palliative consulted, patient recommendation     Hypothermia, moderate:  Presents with temp of 88F.  Per report, patient is usually mildly hypothermic at baseline but at around 96F.  Suspect related to the above infection. Possible component of dementia. Thyroid function normal, glucose normal.   -Transition to comfort cares and therefore discontinuing all life-prolonging medications     Acute kidney injury:  Suspect pre-renal given  poor PO intake. Also possible intra renal injury given infection.   -Transitioning to comfort cares     Thrombocytopenia  Normocytic anemia:  No report of bleeding. Stools have not been black or bloody. Suspect chronically malnourished. Also possible acute reduction in the setting of sepsis/infection.   -Transitioning to comfort cares     Prolonged qtc:  Suspect related to hypothermia.      Dysphagia:  Family reports that he will occasionally choke on food at times at home. Patient has been unsafe for PO intake this hospitalization, comfort cares.      GOC:  I had a long discussion with daughter and wife (who is medical decision maker). They have seemingly been trying to get him into memory care unit vs hospice for the past couple of months. They have had no luck as he doesn't have enough needs? I broached the topic of whether Alex would even want antibiotics to treat the current infection. To this they questioned ultimate goals of care. They recognize that his quality of life is minimal and that he has been suffering with everything going on and that he wouldn't want to live this way. They agree that Alex would not want heroic measures such as intubation, CPR, shocks, or ICU level cares.  Further discussion was had between family and palliative care today 6/30 and the ultimate goal is making the patient comfortable.  Therefore the patient has been transitioned to comfort cares by palliative care.       DVT Prophylaxis: None  Code Status: Comfort Care  Discharge Dispo/Date: Pending        Interval History (Subjective):      Patient is unresponsive this morning.  He is responsive to noxious stimuli.  He pulls away when I pinch his fingers.  He grabs at my hand when I rub on his chest.  Otherwise he refuses to open his eyes.  Does not answer any my questions.                  Physical Exam:      Last Vital Signs:  /53 (BP Location: Right arm)   Pulse 84   Temp 99.7  F (37.6  C) (Axillary)   Resp 18   Ht  "1.651 m (5' 5\")   Wt 53.1 kg (117 lb)   SpO2 92%   BMI 19.47 kg/m        Intake/Output Summary (Last 24 hours) at 6/3/2022 1430  Last data filed at 6/3/2022 0530  Gross per 24 hour   Intake --   Output 600 ml   Net -600 ml       General: Alert, awake, no acute distress.  Patient does not answer any questions and is not participatory.  He is responsive to voice and tactile stimuli.  Sleeping otherwise.  Wet breath sounds.  He is extremely emaciated with significant temporal wasting.  HEENT: NC/AT, eyes anicteric, external occular movements intact, face symmetric.  Cardiac: RRR, S1, S2.  No murmurs appreciated.  Pulmonary: Normal chest rise, normal work of breathing.  Lungs CTA BL  Abdomen: soft, non-tender, non-distended.  Soft belly.  Bowel Sounds Present.  No guarding.  Extremities: no deformities.  Warm, well perfused.  He has mild edema of the bilateral lower extremities.  Skin: no rashes or lesions noted.  Warm and Dry.  Neuro:  He does not speak or respond.  He is moving all extremities spontaneously.  Psych: Lethargic, somnolent         Medications:      All current medications were reviewed with changes reflected in problem list.         Data:      All new lab and imaging data was reviewed.   Labs:       Lab Results   Component Value Date     06/03/2022     06/03/2022     06/02/2022     03/21/2018     03/20/2018     03/19/2018    Lab Results   Component Value Date    CHLORIDE 108 06/03/2022    CHLORIDE 105 06/03/2022    CHLORIDE 102 06/02/2022    CHLORIDE 99 03/21/2018    CHLORIDE 96 03/20/2018    CHLORIDE 88 03/19/2018    Lab Results   Component Value Date    BUN 36 06/03/2022    BUN 36 06/03/2022    BUN 37 06/02/2022    BUN 11 03/21/2018    BUN 15 03/20/2018    BUN 19 03/19/2018      Lab Results   Component Value Date    POTASSIUM 4.6 06/03/2022    POTASSIUM 4.8 06/03/2022    POTASSIUM 4.9 06/02/2022    POTASSIUM 4.0 03/21/2018    POTASSIUM 4.0 03/20/2018    POTASSIUM " 4.0 03/19/2018    Lab Results   Component Value Date    CO2 22 06/03/2022    CO2 21 06/03/2022    CO2 22 06/02/2022    CO2 24 03/21/2018    CO2 25 03/20/2018    CO2 25 03/19/2018    Lab Results   Component Value Date    CR 1.77 06/03/2022    CR 1.61 06/03/2022    CR 1.53 06/02/2022    CR 0.79 03/21/2018    CR 0.84 03/20/2018    CR 0.90 03/19/2018        Recent Labs   Lab 06/03/22  0656   WBC 7.4   HGB 8.8*   HCT 27.3*   MCV 91   PLT 64*      Imaging:   No results found for this or any previous visit (from the past 24 hour(s)).      Demar Moody DO

## 2022-06-03 NOTE — PLAN OF CARE
SLP: Swallow evaluation attempted though pt not appropriate: pt not alert (unable to open eyes, no verbalizations, no responses to questions), slight tongue movement response to moistened toothettes though no other attempts at manipulation, overt wet quality with groans/weak cough with ability to self-manage secretions requiring frequent suction by RN staff throughout the day and writer during session. Rec: NPO status, oral cares, suction as needed. RN reported palliative care conference with family at 1300 today, will monitor goals of care.         Addendum: Per chart review, pt/family elected comfort cares. Will complete orders. Diet per MD.

## 2022-06-03 NOTE — PROGRESS NOTES
SPIRITUAL HEALTH SERVICES Progress Note  I met with pt's family this afternoon. They requested that Fr. Cristina come to provide Anointing and Communion this afternoon. They are sad that pt is getting closer to death but are accepting of it.     I contacted Fr. Cristina, who will come to offer support this afternoon.       Roland Eng  Associate

## 2022-06-03 NOTE — PLAN OF CARE
End of Shift Summary  For vital signs and complete assessments, please see documentation flowsheets.     Pertinent assessments: Lethargic/does not respond to sternal rub. Withdraws hand with blood glucose checks. Did not open eyes this shift. Slept all shift. Nonverbal. Tmax 99.7axillary. Oral cares done, orally suctioned multiple times this shift. No oral intake. Repositioned, heels elevated.     Major Shift Events: BG 91, 68 (25ml dextrose given) recheck 135. No oral intake this shift. Oral suctioned, weak cough. Changed to comfort cares. Beckford catheter in place for end of life care. Temp 100.5-tylenol supp given.     Treatment Plan: Comfort cares.

## 2022-06-03 NOTE — PLAN OF CARE
Goal Outcome Evaluation:    Pertinent assessments: Assumed cares [id number]. Pt lethargic, responds to gentle touch. Did not open eyes. Did not speak. Restless, resisting cares and vitals check. Temp 96.7. Later, patient's skin was warm/hot, temp 100.0. So, bear-hugger blanket taken off, and recheck Temp 98.4, 96. Lab called with a critical glucose of 37. MD notified and hypoglycemia protocol ordered. 50ml of D50 given, and re . /60, HR 90s-100. Patient having congested cough, unable to clear. Suctioned per mouth. Low urine output of 150ml this shift in Beckford. No BM. Patient on bedrest. Too lethargic to have any oral intake.  Major Shift Events .  Treatment Plan: .  Bedside Nurse: Makeda Felder RN

## 2022-06-03 NOTE — UTILIZATION REVIEW
Admission Status; Secondary Review Determination       Under the authority of the Utilization Management Committee, the utilization review process indicated a secondary review on the above patient. The review outcome is based on review of the medical records, discussions with staff, and applying clinical experience noted on the date of the review.     (x) Inpatient Status Appropriate - This patient's medical care is consistent with medical management for inpatient care and reasonable inpatient medical practice.     RATIONALE FOR DETERMINATION     Franklyn Hein is an 89 year old male with history of Lewy body dementia, type 2 diabetes mellitus, hypertension, COPD, multiple myeloma, and anemia who presented to the emergency department with hematuria.  He has been declining over the last few years but more rapidly in the last few weeks.  His wife brought him to the emergency department because he seemed less engaged, less interactive, and somewhat agitated.  Hematuria was noted when his depends diaper was changed so he was brought into the emergency department for evaluation.  Emergency department evaluation showed hypothermia with temperature 88.3.  He was hypertensive.  Laboratory evaluation showed BUN 37, creatinine 1.53, hemoglobin 9.8, platelets 70, and abnormal urinalysis with 155 white blood cells, 36 red blood cells, large leukocyte esterase, large blood, positive nitrite many bacteria, and white blood cell clumps.  Given pyuria on urinalysis, hypothermia, and altered mental status suggestive of septic encephalopathy he was admitted with concern about pyelonephritis and sepsis. Alex's wife is recognizing his decline in his contemplating hospice involvement with a limited care plan.  She wanted to discuss this further with other family members.  Type of care has been consulted.  Inpatient admission is appropriate for treatment of sepsis due to pyelonephritis and also for better defining of care plan which  may include hospice.       At the time of admission with the information available to the attending physician more than 2 nights Hospital complex care was anticipated, based on patient risk of adverse outcome if treated as outpatient and complex care required. Inpatient admission is appropriate based on the Medicare guidelines.     This document was produced using voice recognition software       The information on this document is developed by the utilization review team in order for the business office to ensure compliance. This only denotes the appropriateness of proper admission status and does not reflect the quality of care rendered.   The definitions of Inpatient Status and Observation Status used in making the determination above are those provided in the CMS Coverage Manual, Chapter 1 and Chapter 6, section 70.4.   Sincerely,     Robbie Almonte MD    Utilization Review  Physician Advisor  [facility].

## 2022-06-04 NOTE — PLAN OF CARE
Goal Outcome Evaluation:        To Do:  End of Shift Summary  For vital signs and complete assessments, please see documentation flowsheets.     Pertinent assessments: Lethargic. No signs of pain noted. Comfortable. Atropine and Robinul for secretion. Regular moth care done. Repositioned. Beckford with minimal output.  Major Shift Events: uneventful  Treatment Plan: Comfort cares.  Bedside Nurse: Jacqueline Monreal RN

## 2022-06-04 NOTE — PLAN OF CARE
End of Shift Summary  For vital signs and complete assessments, please see documentation flowsheets.     Pertinent assessments: Lethargic. Unresponsive to voice. Agitated with repositioning.  No signs of pain noted. No secretions today. Regular mouth care done. Repositioned. Beckford with minimal output.    Major Shift Events: uneventful    Treatment Plan: Comfort cares.

## 2022-06-04 NOTE — PROGRESS NOTES
[facility]  Hospitalist Progress Note  Jacqueline Houser MD 06/03/22       Reason for Stay (Diagnosis): AMS         Assessment and Plan:      Summary of Stay: Franklyn Hein is a 89 year old male with PMH including Lewy body dementia, type 2 diabetes mellitus, hypertension, COPD, cancer, anemia who presents with hematuria.    Problem List/Assessment and Plan:   Sepsis  Hematuria  Urinary tract infection  Possible pyelonephritis:  AMS, hypothermia. Wife reports decline in mental status over past 1-2 weeks. Last night he was very restless and wouldn't sleep. When she woke up, he was naked, cold, and covered in sweat. His depends were stained with red urine. Urinalysis with evidence for UTI. Associated DONTE with possible pyelonephritis. Blood cultures were drawn and he was initiated on zosyn.   -Palliative care following, appreciate recommendation  -Transitioned to comfort cares and therefore all life-prolonging medications have been discontinued    Lewy body dementia:  Seems that the patient has had a pretty poor quality of life over the past couple weeks to months.  He was diagnosed with Lewy body dementia years ago and has been slowly progressive since that time.  The wife reports that he largely does nothing anymore besides sit at home.  He rarely talks.  Does not eat very well.  Family has been trying to enroll him in hospice/memory care but have been unsuccessful as he is somewhat healthy other than the Lewy body dementia.  He is on no medications apart from Zyprexa/Seroquel.  -Palliative consulted, patient recommendation     Hypothermia, moderate:  Presents with temp of 88F.  Per report, patient is usually mildly hypothermic at baseline but at around 96F.  Suspect related to the above infection. Possible component of dementia. Thyroid function normal, glucose normal.   -no longer an issue     Acute kidney injury:  Suspect pre-renal given poor PO intake. Also possible intra renal injury given  infection.   -Transitioning to comfort cares     Thrombocytopenia  Normocytic anemia:  No report of bleeding. Stools have not been black or bloody. Suspect chronically malnourished. Also possible acute reduction in the setting of sepsis/infection.   -Transitioning to comfort cares     Prolonged qtc:  Suspect related to hypothermia.      Dysphagia:  Family reports that he will occasionally choke on food at times at home. Patient has been unsafe for PO intake this hospitalization, comfort cares.      GOC:  My partner had a long discussion with daughter and wife (who is medical decision maker). They have seemingly been trying to get him into memory care unit vs hospice for the past couple of months. They have had no luck as he doesn't have enough needs? He broached the topic of whether Alex would even want antibiotics to treat the current infection. To this they questioned ultimate goals of care. They recognize that his quality of life is minimal and that he has been suffering with everything going on and that he wouldn't want to live this way. They agree that Alex would not want heroic measures such as intubation, CPR, shocks, or ICU level cares.  Further discussion was had between family and palliative care 6/3 and the ultimate goal is making the patient comfortable.  Therefore the patient has been transitioned to comfort cares by palliative care.  He appears comfortable and has essentially been sleeping for the past 24 hours and then some.     Discussed with SW, has been accepted at [facility] but they are currently without any beds       DVT Prophylaxis: None  Code Status: Comfort Care  Discharge Dispo/Date: Pending      Discussed comfort care and what we are providing with son and DIL        Interval History (Subjective):      He is laying comfortably in bed and sleeping                   Physical Exam:      Last Vital Signs:  BP (!) 162/64 (BP Location: Right arm)   Pulse 75   Temp 98.1  F (36.7  C)  "(Axillary)   Resp 20   Ht 1.651 m (5' 5\")   Wt 53.1 kg (117 lb)   SpO2 (!) 88%   BMI 19.47 kg/m        Laying comfortably in bed and sleeping         Medications:      All current medications were reviewed with changes reflected in problem list.         Data:      All new lab and imaging data was reviewed.   Labs:       Lab Results   Component Value Date     06/03/2022     06/03/2022     06/02/2022     03/21/2018     03/20/2018     03/19/2018    Lab Results   Component Value Date    CHLORIDE 108 06/03/2022    CHLORIDE 105 06/03/2022    CHLORIDE 102 06/02/2022    CHLORIDE 99 03/21/2018    CHLORIDE 96 03/20/2018    CHLORIDE 88 03/19/2018    Lab Results   Component Value Date    BUN 36 06/03/2022    BUN 36 06/03/2022    BUN 37 06/02/2022    BUN 11 03/21/2018    BUN 15 03/20/2018    BUN 19 03/19/2018      Lab Results   Component Value Date    POTASSIUM 4.6 06/03/2022    POTASSIUM 4.8 06/03/2022    POTASSIUM 4.9 06/02/2022    POTASSIUM 4.0 03/21/2018    POTASSIUM 4.0 03/20/2018    POTASSIUM 4.0 03/19/2018    Lab Results   Component Value Date    CO2 22 06/03/2022    CO2 21 06/03/2022    CO2 22 06/02/2022    CO2 24 03/21/2018    CO2 25 03/20/2018    CO2 25 03/19/2018    Lab Results   Component Value Date    CR 1.77 06/03/2022    CR 1.61 06/03/2022    CR 1.53 06/02/2022    CR 0.79 03/21/2018    CR 0.84 03/20/2018    CR 0.90 03/19/2018        Recent Labs   Lab 06/03/22  0656   WBC 7.4   HGB 8.8*   HCT 27.3*   MCV 91   PLT 64*      Imaging:   No results found for this or any previous visit (from the past 24 hour(s)).      Jacqueline Houser MD    "

## 2022-06-04 NOTE — PROGRESS NOTES
Writer spoke with Beatriz Rodriguez.  She is being told by wife of patient, that they were told to pick two places and they already picked their two places. Writer explained that it is nice to have back up some times, however I was not the SW involved yesterday so I do not know the details.   Writer emailed [facility] nursing [facility] list to Jennifer, and was informed by another SW present yesterday, that [facility] is full for the forseeable future and [facility] will not have openings until possibly Wednesday. In email to jennifer, writer encouraged her and family to identify AT LEAST 1-2 more locations to send referrals to.     Theresa BAUM, Casual   Inpatient Care Coordination  [facility]  [phone]

## 2022-06-04 NOTE — PROGRESS NOTES
SW called and left a message with daughter Jennifer to get [facility] choices. BRYANT requested a call back.       SOILA Bass [facility]  [phone]  [address]   [facility]. [id number]  [facility]

## 2022-06-05 NOTE — PROGRESS NOTES
[facility]  Hospitalist Progress Note  Jacqueline Houser MD 06/03/22       Reason for Stay (Diagnosis): AMS         Assessment and Plan:      Summary of Stay: Franklyn Hein is a 89 year old male with PMH including Lewy body dementia, type 2 diabetes mellitus, hypertension, COPD, cancer, anemia who presents with hematuria.    Problem List/Assessment and Plan:   Sepsis  Hematuria  Urinary tract infection  Possible pyelonephritis:  AMS, hypothermia. Wife reports decline in mental status over past 1-2 weeks. Last night he was very restless and wouldn't sleep. When she woke up, he was naked, cold, and covered in sweat. His depends were stained with red urine. Urinalysis with evidence for UTI. Associated DONTE with possible pyelonephritis. Blood cultures were drawn and he was initiated on zosyn.   -Palliative care following, appreciate recommendation  -Transitioned to comfort cares and therefore all life-prolonging medications have been discontinued    Lewy body dementia:  Seems that the patient has had a pretty poor quality of life over the past couple weeks to months.  He was diagnosed with Lewy body dementia years ago and has been slowly progressive since that time.  The wife reports that he largely does nothing anymore besides sit at home.  He rarely talks.  Does not eat very well.  Family has been trying to enroll him in hospice/memory care but have been unsuccessful as he is somewhat healthy other than the Lewy body dementia.  He is on no medications apart from Zyprexa/Seroquel.  -Palliative consulted, patient recommendation     Hypothermia, moderate:  Presents with temp of 88F.  Per report, patient is usually mildly hypothermic at baseline but at around 96F.  Suspect related to the above infection. Possible component of dementia. Thyroid function normal, glucose normal.   -no longer an issue     Acute kidney injury:  Suspect pre-renal given poor PO intake. Also possible intra renal injury given  infection.   -Transitioning to comfort cares     Thrombocytopenia  Normocytic anemia:  No report of bleeding. Stools have not been black or bloody. Suspect chronically malnourished. Also possible acute reduction in the setting of sepsis/infection.   -Transitioning to comfort cares     Prolonged qtc:  Suspect related to hypothermia.      Dysphagia:  Family reports that he will occasionally choke on food at times at home. Patient has been unsafe for PO intake this hospitalization, comfort cares.      GOC:  My partner had a long discussion with daughter and wife (who is medical decision maker). They have seemingly been trying to get him into memory care unit vs hospice for the past couple of months. They have had no luck as he doesn't have enough needs? He broached the topic of whether Alex would even want antibiotics to treat the current infection. To this they questioned ultimate goals of care. They recognize that his quality of life is minimal and that he has been suffering with everything going on and that he wouldn't want to live this way. They agree that Alex would not want heroic measures such as intubation, CPR, shocks, or ICU level cares.  Further discussion was had between family and palliative care 6/3 and the ultimate goal is making the patient comfortable.  Therefore the patient has been transitioned to comfort cares by palliative care.  He appears comfortable and has essentially been sleeping for the past 24 hours and then some.     Discussed with SW, has been accepted at [facility] but they are currently without any beds       DVT Prophylaxis: None  Code Status: Comfort Care  Discharge Dispo/Date: Pending      Discussed comfort care and what we are providing with son and DIL        Interval History (Subjective):      He is laying comfortably in bed and sleeping                   Physical Exam:      Last Vital Signs:  BP (!) 154/59 (BP Location: Right arm)   Pulse 76   Temp 98  F (36.7  C)  "(Axillary)   Resp 20   Ht 1.651 m (5' 5\")   Wt 53.1 kg (117 lb)   SpO2 94%   BMI 19.47 kg/m        Laying comfortably in bed and sleeping         Medications:      All current medications were reviewed with changes reflected in problem list.         Data:      All new lab and imaging data was reviewed.   Labs:       Lab Results   Component Value Date     06/03/2022     06/03/2022     06/02/2022     03/21/2018     03/20/2018     03/19/2018    Lab Results   Component Value Date    CHLORIDE 108 06/03/2022    CHLORIDE 105 06/03/2022    CHLORIDE 102 06/02/2022    CHLORIDE 99 03/21/2018    CHLORIDE 96 03/20/2018    CHLORIDE 88 03/19/2018    Lab Results   Component Value Date    BUN 36 06/03/2022    BUN 36 06/03/2022    BUN 37 06/02/2022    BUN 11 03/21/2018    BUN 15 03/20/2018    BUN 19 03/19/2018      Lab Results   Component Value Date    POTASSIUM 4.6 06/03/2022    POTASSIUM 4.8 06/03/2022    POTASSIUM 4.9 06/02/2022    POTASSIUM 4.0 03/21/2018    POTASSIUM 4.0 03/20/2018    POTASSIUM 4.0 03/19/2018    Lab Results   Component Value Date    CO2 22 06/03/2022    CO2 21 06/03/2022    CO2 22 06/02/2022    CO2 24 03/21/2018    CO2 25 03/20/2018    CO2 25 03/19/2018    Lab Results   Component Value Date    CR 1.77 06/03/2022    CR 1.61 06/03/2022    CR 1.53 06/02/2022    CR 0.79 03/21/2018    CR 0.84 03/20/2018    CR 0.90 03/19/2018        Recent Labs   Lab 06/03/22  0656   WBC 7.4   HGB 8.8*   HCT 27.3*   MCV 91   PLT 64*      Imaging:   No results found for this or any previous visit (from the past 24 hour(s)).      Jacqueline Houser MD    "

## 2022-06-05 NOTE — PLAN OF CARE
Goal Outcome Evaluation:             To Do:  End of Shift Summary  For vital signs and complete assessments, please see documentation flowsheets.     Pertinent assessments: Lethargic, unresponsive. No signs of pain noted. Regular mouth care done. No secretions noted. Repositioned. Beckford patent.  Major Shift Events: uneventful  Treatment Plan: Comfort cares  Bedside Nurse: Jacqueline Monreal RN

## 2022-06-05 NOTE — PROGRESS NOTES
Care Management Follow Up    Length of Stay (days): 3    Expected Discharge Date: 06/06/2022     Concerns to be Addressed:    Finding placement   Patient plan of care discussed at interdisciplinary rounds: Yes    Anticipated Discharge Disposition:  Nursing home with hospice services     Anticipated Discharge Services:  hospice   Anticipated Discharge DME: unknown      Additional Information:  Chart reviewed and also received email from BRYANT yesterday who emailed me and pt's dtr jennifer with [facility] nursing [facility].     Called and spoke with Jennifer this morning and let her know I was f/u with her to find out what other preferences she decided on so I could make referrals. She became upset and told me they already chose their two; [facility] and [facility].  I explained to her that my understanding was the the BRYANT spoke to her yesterday and explained to her that [facility] does not have any openings and [facility] will not have any openings until possibly Wed. And that's why the email was sent to her in order for her to choose at least two more nursing homes. She became upset and told me nobody explained this to her.     I explained to her that we needed to work on discharge planning and I needed a couple more options and would also like to know what hospice agency they would like, I named a few for her to choose from.      She was upset and said she needs to drive all the way here to discuss with her mother before she makes a decision.  She then asked who the BRYANT's supervisor was. I provided her with supervisor's name and phone number.      I asked that Jennifer call me back today so I could send referrals out.      Claudette BAKER, [facility]  Inpatient Care Coordination   [facility]   [phone]    Addendum:  Jennifer called back asking for a list of nursing homes closer to the area, stating the [facility] list that was emailed to her were all out of the area.  But did ask that I make  referrals to [facility]. [facility] and [facility]. I provided her with med.gov in order to find listings.      She then said she spoke to someone at [facility] and they told her we could not discharge pt without a safe discharge plan.  I let Jennifer know that we would never discharge pt if discharge plan was not safe, but also indicated that if there was no medical reason to keep pt, we can't just let pt's remain in the hospital until their TCU preference became available. She was very tearful and seems very resistant to providing choices or wanting pt to discharge anywhere but [facility].

## 2022-06-05 NOTE — PLAN OF CARE
End of Shift Summary  For vital signs and complete assessments, please see documentation flowsheets.     Pertinent assessments: Lethargic most of shift.Combative with repositioning and oral cares. Mouth care done when pt allows. No signs of pain noted. No secretions noted.Up with 2 assist with lift.  Not OOB this shift. Repositioned. Beckford patent with small amount urine.     Major Shift Events: agitated and combative with repositioning and oral cares.     Treatment Plan: Comfort cares. Waiting on placement.

## 2022-06-06 NOTE — PROGRESS NOTES
[facility]  Hospitalist Progress Note  Jacqueline Houser MD 06/03/22       Reason for Stay (Diagnosis): AMS         Assessment and Plan:      Summary of Stay: Franklyn Hein is a 89 year old male with PMH including Lewy body dementia, type 2 diabetes mellitus, hypertension, COPD, cancer, anemia who presents with hematuria.    Remains intermittently agitated, prn lorazepam working well, no po intake-fluid or food.  Still trying to find a place for him to go but seems like this is quite difficult due to bed availability in hospice care centers.    Just continuing with comfort measures     Problem List/Assessment and Plan:   Sepsis  Hematuria  Urinary tract infection  Possible pyelonephritis:  AMS, hypothermia. Wife reports decline in mental status over past 1-2 weeks. Last night he was very restless and wouldn't sleep. When she woke up, he was naked, cold, and covered in sweat. His depends were stained with red urine. Urinalysis with evidence for UTI. Associated DONTE with possible pyelonephritis. Blood cultures were drawn and he was initiated on zosyn.   -Palliative care following, appreciate recommendation  -Transitioned to comfort cares and therefore all life-prolonging medications have been discontinued    Lewy body dementia:  Seems that the patient has had a pretty poor quality of life over the past couple weeks to months.  He was diagnosed with Lewy body dementia years ago and has been slowly progressive since that time.  The wife reports that he largely does nothing anymore besides sit at home.  He rarely talks.  Does not eat very well.  Family has been trying to enroll him in hospice/memory care but have been unsuccessful as he is somewhat healthy other than the Lewy body dementia.  He is on no medications apart from Zyprexa/Seroquel.  -Palliative consulted, patient recommendation     Hypothermia, moderate:  Presents with temp of 88F.  Per report, patient is usually mildly hypothermic at baseline  but at around 96F.  Suspect related to the above infection. Possible component of dementia. Thyroid function normal, glucose normal.   -no longer an issue     Acute kidney injury:  Suspect pre-renal given poor PO intake. Also possible intra renal injury given infection.   -Transitioning to comfort cares     Thrombocytopenia  Normocytic anemia:  No report of bleeding. Stools have not been black or bloody. Suspect chronically malnourished. Also possible acute reduction in the setting of sepsis/infection.   -Transitioning to comfort cares     Prolonged qtc:  Suspect related to hypothermia.      Dysphagia:  Family reports that he will occasionally choke on food at times at home. Patient has been unsafe for PO intake this hospitalization, comfort cares.      GOC:  My partner had a long discussion with daughter and wife (who is medical decision maker). They have seemingly been trying to get him into memory care unit vs hospice for the past couple of months. They have had no luck as he doesn't have enough needs? He broached the topic of whether Alex would even want antibiotics to treat the current infection. To this they questioned ultimate goals of care. They recognize that his quality of life is minimal and that he has been suffering with everything going on and that he wouldn't want to live this way. They agree that Alex would not want heroic measures such as intubation, CPR, shocks, or ICU level cares.  Further discussion was had between family and palliative care 6/3 and the ultimate goal is making the patient comfortable.  Therefore the patient has been transitioned to comfort cares by palliative care.  He appears comfortable and has essentially been sleeping for the past 24 hours and then some.     Discussed with SW, has been accepted at [facility] but they are currently without any beds       DVT Prophylaxis: None  Code Status: Comfort Care  Discharge Dispo/Date: Pending      Discussed comfort care and what  "we are providing with son and DIL        Interval History (Subjective):      He is laying comfortably in bed and sleeping                   Physical Exam:      Last Vital Signs:  BP (!) 154/59 (BP Location: Right arm)   Pulse 76   Temp 98  F (36.7  C) (Axillary)   Resp 20   Ht 1.651 m (5' 5\")   Wt 53.1 kg (117 lb)   SpO2 94%   BMI 19.47 kg/m        Laying comfortably in bed and sleeping         Medications:      All current medications were reviewed with changes reflected in problem list.         Data:      All new lab and imaging data was reviewed.   Labs:       Lab Results   Component Value Date     06/03/2022     06/03/2022     06/02/2022     03/21/2018     03/20/2018     03/19/2018    Lab Results   Component Value Date    CHLORIDE 108 06/03/2022    CHLORIDE 105 06/03/2022    CHLORIDE 102 06/02/2022    CHLORIDE 99 03/21/2018    CHLORIDE 96 03/20/2018    CHLORIDE 88 03/19/2018    Lab Results   Component Value Date    BUN 36 06/03/2022    BUN 36 06/03/2022    BUN 37 06/02/2022    BUN 11 03/21/2018    BUN 15 03/20/2018    BUN 19 03/19/2018      Lab Results   Component Value Date    POTASSIUM 4.6 06/03/2022    POTASSIUM 4.8 06/03/2022    POTASSIUM 4.9 06/02/2022    POTASSIUM 4.0 03/21/2018    POTASSIUM 4.0 03/20/2018    POTASSIUM 4.0 03/19/2018    Lab Results   Component Value Date    CO2 22 06/03/2022    CO2 21 06/03/2022    CO2 22 06/02/2022    CO2 24 03/21/2018    CO2 25 03/20/2018    CO2 25 03/19/2018    Lab Results   Component Value Date    CR 1.77 06/03/2022    CR 1.61 06/03/2022    CR 1.53 06/02/2022    CR 0.79 03/21/2018    CR 0.84 03/20/2018    CR 0.90 03/19/2018        Recent Labs   Lab 06/03/22  0656   WBC 7.4   HGB 8.8*   HCT 27.3*   MCV 91   PLT 64*      Imaging:   No results found for this or any previous visit (from the past 24 hour(s)).      Jacqueline Houser MD    "

## 2022-06-06 NOTE — PLAN OF CARE
End of Shift Summary  For vital signs and complete assessments, please see documentation flowsheets.     Pertinent assessments: Lethargic and sleeping for most of the shift. Family visiting at bedside for part of the evening. Beckford patent. Not up this shift.    Major Shift Events: none.    Treatment Plan: Awaiting placement. [facility]    Bedside Nurse: Cherelle Tiwari RN

## 2022-06-06 NOTE — PROGRESS NOTES
CLINICAL NUTRITION SERVICES    Noted patient on comfort cares. Will not sign on. Please place consult if nutrition concerns arise. Thank you.

## 2022-06-06 NOTE — PLAN OF CARE
Goal Outcome Evaluation:        End of Shift Summary  For vital signs and complete assessments, please see documentation flowsheets.     Pertinent assessments: Lethargic. Combative with repositioning and cares. Beckford patent. Up with assist of 2, however not out of bed this shift.    Major Shift Events: PRN ativan given x 1 with good effect. Pt frequently calling out, grimacing with repositioning (agitated with repo), prn dilaudid given x 1.     Treatment Plan: Awaiting placement. Comfort cares  Bedside Nurse: Kim Michelle RN

## 2022-06-06 NOTE — PLAN OF CARE
Goal Outcome Evaluation:      To Do:  End of Shift Summary  For vital signs and complete assessments, please see documentation flowsheets.     Pertinent assessments: Lethargic. No signs of pain and secretions. Combative with repositioning and oral cares. Beckford patent. Up with assist of 2.  Major Shift Events: uneventful  Treatment Plan: Awaiting placement. Comfort cares  Bedside Nurse: Jacqueline Monreal RN

## 2022-06-07 NOTE — PROGRESS NOTES
[facility]  Hospitalist Progress Note  Jacqueline Houser MD 06/03/22       Reason for Stay (Diagnosis): AMS         Assessment and Plan:      Summary of Stay: Franklyn Hein is a 89 year old male with PMH including Lewy body dementia, type 2 diabetes mellitus, hypertension, COPD, cancer, anemia who presents with hematuria.    Remains intermittently agitated, prn lorazepam working well, no po intake-fluid or food.  Still trying to find a place for him to go but seems like this is quite difficult due to bed availability in hospice care centers.    Just continuing with comfort measures     Problem List/Assessment and Plan:   Sepsis  Hematuria  Urinary tract infection  Possible pyelonephritis:  AMS, hypothermia. Wife reports decline in mental status over past 1-2 weeks. Last night he was very restless and wouldn't sleep. When she woke up, he was naked, cold, and covered in sweat. His depends were stained with red urine. Urinalysis with evidence for UTI. Associated DONTE with possible pyelonephritis. Blood cultures were drawn and he was initiated on zosyn.   -Palliative care following, appreciate recommendation  -Transitioned to comfort cares and therefore all life-prolonging medications have been discontinued    Lewy body dementia:  Seems that the patient has had a pretty poor quality of life over the past couple weeks to months.  He was diagnosed with Lewy body dementia years ago and has been slowly progressive since that time.  The wife reports that he largely does nothing anymore besides sit at home.  He rarely talks.  Does not eat very well.  Family has been trying to enroll him in hospice/memory care but have been unsuccessful as he is somewhat healthy other than the Lewy body dementia.  He is on no medications apart from Zyprexa/Seroquel.  -Palliative consulted, patient recommendation     Hypothermia, moderate:  Presents with temp of 88F.  Per report, patient is usually mildly hypothermic at baseline  but at around 96F.  Suspect related to the above infection. Possible component of dementia. Thyroid function normal, glucose normal.   -no longer an issue     Acute kidney injury:  Suspect pre-renal given poor PO intake. Also possible intra renal injury given infection.   -Transitioning to comfort cares     Thrombocytopenia  Normocytic anemia:  No report of bleeding. Stools have not been black or bloody. Suspect chronically malnourished. Also possible acute reduction in the setting of sepsis/infection.   -Transitioning to comfort cares     Prolonged qtc:  Suspect related to hypothermia.      Dysphagia:  Family reports that he will occasionally choke on food at times at home. Patient has been unsafe for PO intake this hospitalization, comfort cares.      GOC:  My partner had a long discussion with daughter and wife (who is medical decision maker). They have seemingly been trying to get him into memory care unit vs hospice for the past couple of months. They have had no luck as he doesn't have enough needs? He broached the topic of whether Alex would even want antibiotics to treat the current infection. To this they questioned ultimate goals of care. They recognize that his quality of life is minimal and that he has been suffering with everything going on and that he wouldn't want to live this way. They agree that Alex would not want heroic measures such as intubation, CPR, shocks, or ICU level cares.  Further discussion was had between family and palliative care 6/3 and the ultimate goal is making the patient comfortable.  Therefore the patient has been transitioned to comfort cares by palliative care.  He appears comfortable and has essentially been sleeping for the past 24 hours and then some.     Discussed with SW, has been accepted at [facility] but they are currently without any beds       DVT Prophylaxis: None  Code Status: Comfort Care  Discharge Dispo/Date: Pending      Discussed comfort care and what  "we are providing with son and DIL        Interval History (Subjective):      He is laying comfortably in bed and sleeping                   Physical Exam:      Last Vital Signs:  BP (!) 154/59 (BP Location: Right arm)   Pulse 76   Temp 98  F (36.7  C) (Axillary)   Resp 20   Ht 1.651 m (5' 5\")   Wt 53.1 kg (117 lb)   SpO2 94%   BMI 19.47 kg/m        Laying comfortably in bed and sleeping         Medications:      All current medications were reviewed with changes reflected in problem list.         Data:      All new lab and imaging data was reviewed.   Labs:       Lab Results   Component Value Date     06/03/2022     06/03/2022     06/02/2022     03/21/2018     03/20/2018     03/19/2018    Lab Results   Component Value Date    CHLORIDE 108 06/03/2022    CHLORIDE 105 06/03/2022    CHLORIDE 102 06/02/2022    CHLORIDE 99 03/21/2018    CHLORIDE 96 03/20/2018    CHLORIDE 88 03/19/2018    Lab Results   Component Value Date    BUN 36 06/03/2022    BUN 36 06/03/2022    BUN 37 06/02/2022    BUN 11 03/21/2018    BUN 15 03/20/2018    BUN 19 03/19/2018      Lab Results   Component Value Date    POTASSIUM 4.6 06/03/2022    POTASSIUM 4.8 06/03/2022    POTASSIUM 4.9 06/02/2022    POTASSIUM 4.0 03/21/2018    POTASSIUM 4.0 03/20/2018    POTASSIUM 4.0 03/19/2018    Lab Results   Component Value Date    CO2 22 06/03/2022    CO2 21 06/03/2022    CO2 22 06/02/2022    CO2 24 03/21/2018    CO2 25 03/20/2018    CO2 25 03/19/2018    Lab Results   Component Value Date    CR 1.77 06/03/2022    CR 1.61 06/03/2022    CR 1.53 06/02/2022    CR 0.79 03/21/2018    CR 0.84 03/20/2018    CR 0.90 03/19/2018        Recent Labs   Lab 06/03/22  0656   WBC 7.4   HGB 8.8*   HCT 27.3*   MCV 91   PLT 64*      Imaging:   No results found for this or any previous visit (from the past 24 hour(s)).      Jacqueline Houser MD    "

## 2022-06-07 NOTE — PLAN OF CARE
Goal Outcome Evaluation:  End of Shift Summary  For vital signs and complete assessments, please see documentation flowsheets.     Pertinent assessments: Pt sleeping and comfortable in between cares. Appears comfortable. Pt refused turning most of shift, shifted weight when able. Beckford patent and draining.     Major Shift Events: Uneventful    Treatment Plan: Comfort cares, awaiting placement with hospice.

## 2022-06-07 NOTE — PROGRESS NOTES
Care Management Follow Up    Length of Stay (days): 5    Expected Discharge Date: 06/07/2022     Concerns to be Addressed:  Need more choices from family for LTC with hospice bed. All the facilities where referrals have been sent have declined patient.      Patient plan of care discussed at interdisciplinary rounds: Yes    Anticipated Discharge Disposition: LTC with hospice      Anticipated Discharge Services:  Hospice with LTC    Anticipated Discharge DME:  NA hospice to provide    Patient/family educated on Medicare website which has current facility and service quality ratings:  yes  Education Provided on the Discharge Plan:  yes  Patient/Family in Agreement with the Plan: yes     Referrals Placed by CM/SW: yes   Private pay costs discussed: private room/amenity fees and transportation costs    Additional Information:  LTC facilities that family had requested for patient have declined his admission.    Called wife for more choices, phone just rang and rang. Left message with daughter Jennifer regarding more choices for LTC.    Addendum:[facility] declined patient due agitation and not meeting their criteria of 30 days or less of life expectancy.    Spoke with daughter, Jennifer, updated her on the search for placement. She asked me to email her a list of non-[facility] contracted facilities. E-mail sent. Family will give us more choices tomorrow.    BAUTISTA Pearson   Inpatient Care Coordination   Supervisor  [facility]  [phone]      BAUTISTA Chamberlain

## 2022-06-07 NOTE — PLAN OF CARE
Goal Outcome Evaluation:                    To Do:  End of Shift Summary  For vital signs and complete assessments, please see documentation flowsheets.     Pertinent assessments: Pt resting comfortable --taking in sips of water --- garcia draining well  Major Shift Events  family here to visit   Treatment Plan:  support   Bedside Nurse: Dennise Fitzgerald RN

## 2022-06-08 NOTE — PROGRESS NOTES
Pt found not breathing  --  notified and pronounced ---- family here --- ring and necklace left on pt per request ----  Forms done

## 2022-06-08 NOTE — PLAN OF CARE
Goal Outcome Evaluation:    End of Shift Summary  For vital signs and complete assessments, please see documentation flowsheets.     Pertinent assessments: Pt resting comfortably. Mouth care provided. Beckford in place. Minimal urine output. Repositioned.    Major Shift Events: none    Treatment Plan:  comfort care. Awaiting placement.    Bedside Nurse: Storm Gilmore RN

## 2022-06-08 NOTE — PROGRESS NOTES
Death pronouncement    Called to pronounce death  Date and time of death 6/8/2022   09:15    No audible breath sounds, no peripheral pulses, no auscultatory heart sounds  Distal extremities cooling and becoming mottled.  Waxy appearance setting in

## 2022-06-08 NOTE — DISCHARGE SUMMARY
Death Summary  Hospitalist Service    Franklyn Hein MRN# [id number]   YOB: 1932 Age: 89 year old     Date of Admission:  6/2/2022  Date and Time of Death:  6/8/2022  09:15  Admitting Physician:  Demar Moody DO  Discharge Physician: Jacqueline Houser MD  Discharging Service: Hospitalist Service     Primary Provider: Melvin Hoskins  Primary Care Physician Phone Number: [phone]         Discharge Diagnoses/Problem Oriented Hospital Course (Providers):      Franklyn Hein was admitted on 6/2/2022 by Demar Moody DO and I would refer you to their history and physical.  The following problems were addressed during his hospitalization:      Death diagnoses:     UTI date of onset 7 days     Progressive Lewy Body dementia:  Date of onset unknown suspect > a year    Contributors to death       DONTE       Dysphagia              Brief Hospital Stay Summary        Summary of Stay: Franklyn Hein is a 89 year old male with PMH including Lewy body dementia, type 2 diabetes mellitus, hypertension, COPD, cancer, anemia who presents with hematuria.     Remains intermittently agitated, prn lorazepam working well, no po intake-fluid or food.  Still trying to find a place for him to go but seems like this is quite difficult due to bed availability in hospice care centers.     Just continuing with comfort measures      Problem List/Assessment and Plan:   Sepsis with septic encephalopathy  Hematuria  Urinary tract infection  Possible pyelonephritis:  AMS, hypothermia. Wife reports decline in mental status over past 1-2 weeks. Last night he was very restless and wouldn't sleep. When she woke up, he was naked, cold, and covered in sweat. His depends were stained with red urine. Urinalysis with evidence for UTI. Associated DONTE with possible pyelonephritis. Blood cultures were drawn and he was initiated on zosyn.   -Palliative care following, appreciate recommendation  -Transitioned to comfort cares and  therefore all life-prolonging medications have been discontinued     Lewy body dementia:  Seems that the patient has had a pretty poor quality of life over the past couple weeks to months.  He was diagnosed with Lewy body dementia years ago and has been slowly progressive since that time.  The wife reports that he largely does nothing anymore besides sit at home.  He rarely talks.  Does not eat very well.  Family has been trying to enroll him in hospice/memory care but have been unsuccessful as he is somewhat healthy other than the Lewy body dementia.  He is on no medications apart from Zyprexa/Seroquel.  -Palliative consulted, patient recommendation     Hypothermia, moderate:  Presents with temp of 88F.  Per report, patient is usually mildly hypothermic at baseline but at around 96F.  Suspect related to the above infection. Possible component of dementia. Thyroid function normal, glucose normal.   -no longer an issue     Acute kidney injury:  Suspect pre-renal given poor PO intake. Also possible intra renal injury given infection.   -Transitioning to comfort cares     Thrombocytopenia  Normocytic anemia:  No report of bleeding. Stools have not been black or bloody. Suspect chronically malnourished. Also possible acute reduction in the setting of sepsis/infection.   -Transitioning to comfort cares     Prolonged qtc:  Suspect related to hypothermia.      Dysphagia:  Family reports that he will occasionally choke on food at times at home. Patient has been unsafe for PO intake this hospitalization, comfort cares.            Important Results:        As noted below         Pending Results:        Unresulted Labs Ordered in the Past 30 Days of this Admission     No orders found from 5/3/2022 to 6/3/2022.            Discharge Instructions and Follow-Up:            Discharge Disposition:        Discharged to home         Discharge Medications:        Discharge Medication List as of 6/8/2022  2:42 PM      CONTINUE these  "medications which have NOT CHANGED    Details   acetaminophen (TYLENOL) 325 MG tablet Take 650 mg by mouth every 6 hours, Historical      OLANZapine (ZYPREXA) 5 MG tablet Take 2.5 mg by mouth daily, Historical      QUEtiapine (SEROQUEL) 25 MG tablet Take 25 mg by mouth At Bedtime, Historical               Allergies:       No Known Allergies        Consultations This Hospital Stay:        Consultation during this admission received from          Condition and Physical on Discharge:        Discharge condition: Stable   Vitals: Blood pressure (!) 154/59, pulse 76, temperature 98  F (36.7  C), temperature source Axillary, resp. rate 20, height 1.651 m (5' 5\"), weight 53.1 kg (117 lb), SpO2 94 %.     Constitutional: In bed with waxy appearance setting it     Lungs: No spontaneous breath sounds   Cardiovascular: No auscultatory heart sounds, no palpable pulses   Abdomen:    Skin:    Other: Distal extremities becoming cool and mottled         Discharge Time:      greater than 30 minutes.        Image Results From This Hospital Stay (For Non-EPIC Providers):        Results for orders placed or performed during the hospital encounter of 06/02/22   XR Chest Port 1 View    Narrative    XR CHEST PORT 1 VIEW   6/2/2022 1:56 PM     HISTORY: SEPSIS    COMPARISON: Radiograph 9/29/2017      Impression    IMPRESSION: Cardiomediastinal silhouette is within normal limits in  size. Hyperinflated lungs compatible with COPD/emphysema with similar  chronic reticular changes in the lung bases. No new, focal airspace  disease, pleural effusion or pneumothorax. No acute bony abnormality.    AZRA DOYLE MD         SYSTEM ID:  [id number]           Most Recent Lab Results In EPIC (For Non-EPIC Providers):    Most Recent 3 CBC's:  Recent Labs   Lab Test 06/03/22  0656 06/02/22  1944 06/02/22  1232   WBC 7.4 5.8 7.1   HGB 8.8* 9.5* 9.8*   MCV 91 92 92   PLT 64* 57* 70*      Most Recent 3 BMP's:  Recent Labs   Lab Test " 06/03/22  1145 06/03/22  1115 06/03/22  0859 06/03/22  0656 06/03/22  0210 06/03/22  0120 06/02/22  1232   NA  --   --   --  138  --  135 133   POTASSIUM  --   --   --  4.6  --  4.8 4.9   CHLORIDE  --   --   --  108  --  105 102   CO2  --   --   --  22  --  21 22   BUN  --   --   --  36*  --  36* 37*   CR  --   --   --  1.77*  --  1.61* 1.53*   ANIONGAP  --   --   --  8  --  9 9   HAO  --   --   --  8.7  --  8.9 9.3   * 68* 91 90   < > 39* 106*    < > = values in this interval not displayed.     Most Recent 2 LFT's:  Recent Labs   Lab Test 06/02/22  1232 03/19/18  1243   AST 61* 21   ALT 22 19   ALKPHOS 122 101   BILITOTAL 1.1 0.3     Most Recent Cholesterol Panel:  Recent Labs   Lab Test 09/29/17  0620   CHOL 172   *   HDL 43   TRIG 126     Most Recent TSH, T4 and HgbA1c:   Recent Labs   Lab Test 06/02/22  1235 03/19/18  1243 09/29/17  0620   TSH 8.72*   < >  --    T4 1.30  --   --    A1C  --   --  7.2*    < > = values in this interval not displayed.

## (undated) DEVICE — KIT LG BORE TOUHY ACCESS PLUS MAP152

## (undated) DEVICE — PACK SPECIAL PROCEDURE CUSTOM

## (undated) DEVICE — BLADE CLIPPER 4406

## (undated) DEVICE — CATH TRAY FOLEY COUDE 16FR SILVER W/URINE METER 350ML 304716

## (undated) DEVICE — ENDO FORCEP ENDOJAW BIOPSY 2.8MMX160CM FB-220K

## (undated) DEVICE — VESSEL LOOPS RED MAXI

## (undated) DEVICE — NDL 19GA 1.5"

## (undated) DEVICE — WIRE GUIDE LUNDERQUIST .035X260CM G31453

## (undated) DEVICE — CATH ANGIO SOS OMNI-2 5FRX80CM CVD 2H 10732001

## (undated) DEVICE — PACK AAA SBA15AAFS3

## (undated) DEVICE — SHEATH INTRO 6.5FR 11CM

## (undated) DEVICE — SYR ANGIO CONTRAST  150-FT-Q

## (undated) DEVICE — CLIP ETHICON LIGACLIP SM BLUE LT100

## (undated) DEVICE — WIPES FOLEY CARE SURESTEP PROVON DFC100

## (undated) DEVICE — DEVICE MULTI TORQUE TD01

## (undated) DEVICE — SU VICRYL 2-0 CT-1 27" J339H

## (undated) DEVICE — DECANTER BAG 2002S

## (undated) DEVICE — SU MONOCRYL 4-0 PS-2 18" UND Y496G

## (undated) DEVICE — CATH PIGTAILS W/MARKERS 5FR 100CM 7602-20M100SH

## (undated) DEVICE — RAD RX VISIPAQUE 320 (50ML) CHARGE PER ML

## (undated) DEVICE — ESU GROUND PAD UNIVERSAL W/O CORD

## (undated) DEVICE — LINEN TOWEL PACK X5 5464

## (undated) DEVICE — SU VICRYL 3-0 CT-1 36" J338H

## (undated) DEVICE — Device

## (undated) DEVICE — GLOVE PROTEXIS POWDER FREE 8.0 ORTHOPEDIC 2D73ET80

## (undated) DEVICE — SU SILK 2-0 TIE 24" SA75H

## (undated) DEVICE — GUIDE ENDOGRAFT FXTN HELI-FX 16FR 62CM 22MM TIP SG-64

## (undated) DEVICE — KIT ENDO TURNOVER/PROCEDURE W/CLEAN A SCOPE LINERS 103888

## (undated) DEVICE — DRSG TELFA ISLAND 4X8" 7541

## (undated) DEVICE — SU SILK 3-0 TIE 24" SA74H

## (undated) DEVICE — SYR 10ML SLIP TIP W/O NDL

## (undated) DEVICE — PREP CHLORAPREP 26ML TINTED ORANGE  260815

## (undated) DEVICE — CATH PIGTAIL W/MARKERS 5FRX65CM 7602-20M65SH

## (undated) DEVICE — CATH BALLOON FOR STENT GRAFT 10-50MMX65CM Q50-65P

## (undated) DEVICE — APPLIER CLIP ENDOSYSTEM APTUS 12FR 86CM SA-85

## (undated) DEVICE — CATH ANGIO KUMPE SOFT-VU 5FRX65CM CVD H787107327015

## (undated) DEVICE — GUIDEWIRE AMPLATZ SUPER STIFF 0.035"X180CM M001465250

## (undated) DEVICE — SURGICEL HEMOSTAT 2X14" 1951

## (undated) DEVICE — DRAPE IOBAN INCISE 36X23" 6651EZ

## (undated) DEVICE — SU PROLENE 5-0 C-1DA 36" 8720H

## (undated) DEVICE — NDL PERC ENTRY THINWALL 18GA 7.0" G00166

## (undated) DEVICE — SOL NACL 0.9% IRRIG 1000ML BOTTLE 07138-09

## (undated) DEVICE — SOL NACL 0.9% INJ 1000ML BAG 2B1324X

## (undated) DEVICE — SU PROLENE 6-0 C-1DA 30" 8706H

## (undated) DEVICE — VESSEL LOOPS YELLOW MINI 31145660

## (undated) DEVICE — WIRE GLIDE 0.035"X150CM VASC GR3506

## (undated) DEVICE — RAD INFLATOR BASIC COMPAK  IN4130

## (undated) DEVICE — COVER EQUIP LEAD SHIELD 30X30" 5020S

## (undated) DEVICE — SHEATH INTRODUCER DRYSEAL FLEX W/HYDRO CT 16FRX33CM DSF1633

## (undated) DEVICE — SUCTION CANISTER MEDIVAC LINER 3000ML W/LID 65651-530

## (undated) DEVICE — ENDO SHEATH INTRO PROSTH 12FRX30CM

## (undated) DEVICE — SU DERMABOND ADVANCED .7ML DNX12

## (undated) DEVICE — DRAPE LAP W/POUCH 9430

## (undated) RX ORDER — ONDANSETRON 2 MG/ML
INJECTION INTRAMUSCULAR; INTRAVENOUS
Status: DISPENSED
Start: 2017-09-29

## (undated) RX ORDER — FENTANYL CITRATE 50 UG/ML
INJECTION, SOLUTION INTRAMUSCULAR; INTRAVENOUS
Status: DISPENSED
Start: 2017-09-29

## (undated) RX ORDER — FENTANYL CITRATE 50 UG/ML
INJECTION, SOLUTION INTRAMUSCULAR; INTRAVENOUS
Status: DISPENSED
Start: 2017-10-30

## (undated) RX ORDER — CEFAZOLIN SODIUM 2 G/100ML
INJECTION, SOLUTION INTRAVENOUS
Status: DISPENSED
Start: 2017-09-29

## (undated) RX ORDER — HEPARIN SODIUM 1000 [USP'U]/ML
INJECTION, SOLUTION INTRAVENOUS; SUBCUTANEOUS
Status: DISPENSED
Start: 2017-09-29

## (undated) RX ORDER — LIDOCAINE HYDROCHLORIDE 10 MG/ML
INJECTION, SOLUTION INFILTRATION; PERINEURAL
Status: DISPENSED
Start: 2017-09-29

## (undated) RX ORDER — LIDOCAINE HYDROCHLORIDE 20 MG/ML
INJECTION, SOLUTION EPIDURAL; INFILTRATION; INTRACAUDAL; PERINEURAL
Status: DISPENSED
Start: 2017-09-29

## (undated) RX ORDER — BUPIVACAINE HYDROCHLORIDE AND EPINEPHRINE 2.5; 5 MG/ML; UG/ML
INJECTION, SOLUTION EPIDURAL; INFILTRATION; INTRACAUDAL; PERINEURAL
Status: DISPENSED
Start: 2017-09-29

## (undated) RX ORDER — ALBUMIN, HUMAN INJ 5% 5 %
SOLUTION INTRAVENOUS
Status: DISPENSED
Start: 2017-09-29

## (undated) RX ORDER — CEFAZOLIN SODIUM 1 G/3ML
INJECTION, POWDER, FOR SOLUTION INTRAMUSCULAR; INTRAVENOUS
Status: DISPENSED
Start: 2017-09-29

## (undated) RX ORDER — DEXAMETHASONE SODIUM PHOSPHATE 4 MG/ML
INJECTION, SOLUTION INTRA-ARTICULAR; INTRALESIONAL; INTRAMUSCULAR; INTRAVENOUS; SOFT TISSUE
Status: DISPENSED
Start: 2017-09-29

## (undated) RX ORDER — PROPOFOL 10 MG/ML
INJECTION, EMULSION INTRAVENOUS
Status: DISPENSED
Start: 2017-09-29